# Patient Record
Sex: FEMALE | Race: WHITE | HISPANIC OR LATINO | Employment: FULL TIME | URBAN - METROPOLITAN AREA
[De-identification: names, ages, dates, MRNs, and addresses within clinical notes are randomized per-mention and may not be internally consistent; named-entity substitution may affect disease eponyms.]

---

## 2016-06-09 LAB
HPV_EXT - HISTORICAL: NORMAL
PAP SMEAR - HIM PATIENT REPORTED: NORMAL

## 2017-01-04 ENCOUNTER — COMMUNICATION - HEALTHEAST (OUTPATIENT)
Dept: FAMILY MEDICINE | Facility: CLINIC | Age: 42
End: 2017-01-04

## 2017-01-12 ENCOUNTER — OFFICE VISIT - HEALTHEAST (OUTPATIENT)
Dept: FAMILY MEDICINE | Facility: CLINIC | Age: 42
End: 2017-01-12

## 2017-01-12 DIAGNOSIS — E55.9 AVITAMINOSIS D: ICD-10-CM

## 2017-01-12 DIAGNOSIS — R73.03 PREDIABETES: ICD-10-CM

## 2017-01-12 DIAGNOSIS — K21.9 GASTROESOPHAGEAL REFLUX DISEASE WITHOUT ESOPHAGITIS: ICD-10-CM

## 2017-01-12 DIAGNOSIS — Z86.32 HISTORY OF GESTATIONAL DIABETES: ICD-10-CM

## 2017-01-12 NOTE — ASSESSMENT & PLAN NOTE
41 y.o. year old female in clinic today to discuss treatment of the following conditions through diet and lifestyle modification and weight loss:  1. BMI 40.0-44.9, adult    2. Prediabetes    3. History of gestational diabetes    4. Gastroesophageal reflux disease without esophagitis    5. Avitaminosis D      The patient's efforts this past month were successful as evidenced by continued improvement of weight measurements.  The patient feels well and is recovered from her ACL repair surgery.  She started to add back in physical activity such as light exercising.   -Recommend that she continue to increase her exercise   -Continue phentermine.  Refill sent.

## 2017-01-13 ENCOUNTER — COMMUNICATION - HEALTHEAST (OUTPATIENT)
Dept: FAMILY MEDICINE | Facility: CLINIC | Age: 42
End: 2017-01-13

## 2017-01-19 ENCOUNTER — COMMUNICATION - HEALTHEAST (OUTPATIENT)
Dept: HEALTH INFORMATION MANAGEMENT | Facility: CLINIC | Age: 42
End: 2017-01-19

## 2017-02-01 ENCOUNTER — COMMUNICATION - HEALTHEAST (OUTPATIENT)
Dept: FAMILY MEDICINE | Facility: CLINIC | Age: 42
End: 2017-02-01

## 2017-02-01 DIAGNOSIS — R73.03 PREDIABETES: ICD-10-CM

## 2017-02-07 ENCOUNTER — OFFICE VISIT - HEALTHEAST (OUTPATIENT)
Dept: FAMILY MEDICINE | Facility: CLINIC | Age: 42
End: 2017-02-07

## 2017-02-07 DIAGNOSIS — R73.03 PREDIABETES: ICD-10-CM

## 2017-02-07 ASSESSMENT — MIFFLIN-ST. JEOR: SCORE: 1538.33

## 2017-02-07 NOTE — ASSESSMENT & PLAN NOTE
Controlled with metformin, and active weight loss and lifestyle modification.  Patient has finally cross the 200 pounds.  She has dropped her BMI down to 33%.  Discussed making exercise slightlymore routine and how to keep track so as not fall off.  Continue diet course and current treatment modalities.  Follow-up in 1 month for recheck.

## 2017-03-02 ENCOUNTER — OFFICE VISIT - HEALTHEAST (OUTPATIENT)
Dept: RHEUMATOLOGY | Facility: CLINIC | Age: 42
End: 2017-03-02

## 2017-03-02 DIAGNOSIS — M13.0 POLYARTHROPATHY OR POLYARTHRITIS, HAND: ICD-10-CM

## 2017-03-02 DIAGNOSIS — M25.541 ARTHRALGIA OF BOTH HANDS: ICD-10-CM

## 2017-03-02 DIAGNOSIS — M25.542 ARTHRALGIA OF BOTH HANDS: ICD-10-CM

## 2017-03-02 DIAGNOSIS — M15.9 GENERALIZED OSTEOARTHROSIS OF HAND: ICD-10-CM

## 2017-03-16 ENCOUNTER — OFFICE VISIT - HEALTHEAST (OUTPATIENT)
Dept: FAMILY MEDICINE | Facility: CLINIC | Age: 42
End: 2017-03-16

## 2017-03-16 DIAGNOSIS — R73.03 PREDIABETES: ICD-10-CM

## 2017-03-16 ASSESSMENT — MIFFLIN-ST. JEOR: SCORE: 1537.42

## 2017-03-16 NOTE — ASSESSMENT & PLAN NOTE
Weight held steady with recent travel and increase at work.  The travel has now stopped and the work is back to normal.  Discussed food prep and planning.  Also discussed how to keep lifestyle modifications in place the next time these factors come to play.

## 2017-04-11 ENCOUNTER — OFFICE VISIT - HEALTHEAST (OUTPATIENT)
Dept: FAMILY MEDICINE | Facility: CLINIC | Age: 42
End: 2017-04-11

## 2017-04-11 ASSESSMENT — MIFFLIN-ST. JEOR: SCORE: 1534.25

## 2017-04-11 NOTE — ASSESSMENT & PLAN NOTE
Patient did very well this month and started exercising at the gym.  Discussed different avenues with her ACL repair of never walk on the treadmill flat always keep that a 1-1/2-2% elevation.  Actually recommended for the next 2 weeks that she stick with the elliptical, stationary bike and pool workouts before moving back to the treadmill to help develop the muscles in her knee and keep her knee supported while she makes exercise more for routine habit in her life.

## 2017-05-03 ENCOUNTER — COMMUNICATION - HEALTHEAST (OUTPATIENT)
Dept: FAMILY MEDICINE | Facility: CLINIC | Age: 42
End: 2017-05-03

## 2017-05-03 DIAGNOSIS — R73.03 PREDIABETES: ICD-10-CM

## 2017-05-09 ENCOUNTER — OFFICE VISIT - HEALTHEAST (OUTPATIENT)
Dept: FAMILY MEDICINE | Facility: CLINIC | Age: 42
End: 2017-05-09

## 2017-05-09 DIAGNOSIS — Z86.19 H/O COLD SORES: ICD-10-CM

## 2017-05-09 DIAGNOSIS — B00.2 HERPETIC GINGIVOSTOMATITIS: ICD-10-CM

## 2017-05-09 DIAGNOSIS — R73.03 PREDIABETES: ICD-10-CM

## 2017-05-09 ASSESSMENT — MIFFLIN-ST. JEOR: SCORE: 1529.71

## 2017-05-09 NOTE — ASSESSMENT & PLAN NOTE
Improving control through diet, exercise, lifestyle changes and weight loss.  Patient did well this month increasing exercise.  She is finding that when she deniesherself stuff then the following week she tends to fall off and basically gives up for the week and then re-motivated to restart.  Discussed that some of the things she is giving up we actually need to learn how to put back into her diet is as it is evident that she is not going to be able to maintain the lifestyle changes without somehow working it in appropriately.  Discussed strategies for doing so to work on this month.  Follow-up in 1 month.  Continue current treatment plan.

## 2017-06-13 ENCOUNTER — OFFICE VISIT - HEALTHEAST (OUTPATIENT)
Dept: FAMILY MEDICINE | Facility: CLINIC | Age: 42
End: 2017-06-13

## 2017-06-13 ASSESSMENT — MIFFLIN-ST. JEOR: SCORE: 1536.06

## 2017-07-17 ENCOUNTER — COMMUNICATION - HEALTHEAST (OUTPATIENT)
Dept: FAMILY MEDICINE | Facility: CLINIC | Age: 42
End: 2017-07-17

## 2017-08-07 ENCOUNTER — COMMUNICATION - HEALTHEAST (OUTPATIENT)
Dept: FAMILY MEDICINE | Facility: CLINIC | Age: 42
End: 2017-08-07

## 2017-08-07 DIAGNOSIS — R73.03 PREDIABETES: ICD-10-CM

## 2017-08-25 ENCOUNTER — OFFICE VISIT - HEALTHEAST (OUTPATIENT)
Dept: FAMILY MEDICINE | Facility: CLINIC | Age: 42
End: 2017-08-25

## 2017-08-25 DIAGNOSIS — R73.03 PREDIABETES: ICD-10-CM

## 2017-08-25 ASSESSMENT — MIFFLIN-ST. JEOR: SCORE: 1560.33

## 2017-08-25 NOTE — ASSESSMENT & PLAN NOTE
Patient fell off a meal prep and exercise.  Encouraged to restart meal prepping, and as systems go back to school looking at returning back to her exercise routine of walking.  Follow-up in 6 weeks for recheck.  Continue current treatment modalities.

## 2017-10-03 ENCOUNTER — COMMUNICATION - HEALTHEAST (OUTPATIENT)
Dept: SCHEDULING | Facility: CLINIC | Age: 42
End: 2017-10-03

## 2017-10-03 DIAGNOSIS — Z86.19 H/O COLD SORES: ICD-10-CM

## 2017-10-03 DIAGNOSIS — B00.2 HERPETIC GINGIVOSTOMATITIS: ICD-10-CM

## 2017-11-08 ENCOUNTER — COMMUNICATION - HEALTHEAST (OUTPATIENT)
Dept: FAMILY MEDICINE | Facility: CLINIC | Age: 42
End: 2017-11-08

## 2017-11-08 DIAGNOSIS — R73.03 PREDIABETES: ICD-10-CM

## 2017-11-16 ENCOUNTER — OFFICE VISIT - HEALTHEAST (OUTPATIENT)
Dept: FAMILY MEDICINE | Facility: CLINIC | Age: 42
End: 2017-11-16

## 2017-11-16 DIAGNOSIS — R73.03 PREDIABETES: ICD-10-CM

## 2017-11-16 ASSESSMENT — MIFFLIN-ST. JEOR: SCORE: 1535.83

## 2017-11-16 NOTE — ASSESSMENT & PLAN NOTE
Very well this month. Continue current plan. Discussed strategies for Thanksgiving Holiday and Holiday season. Planning days and food prep.

## 2017-12-07 ENCOUNTER — COMMUNICATION - HEALTHEAST (OUTPATIENT)
Dept: HEALTH INFORMATION MANAGEMENT | Facility: CLINIC | Age: 42
End: 2017-12-07

## 2017-12-07 ENCOUNTER — COMMUNICATION - HEALTHEAST (OUTPATIENT)
Dept: TELEHEALTH | Facility: CLINIC | Age: 42
End: 2017-12-07

## 2017-12-21 ENCOUNTER — OFFICE VISIT - HEALTHEAST (OUTPATIENT)
Dept: FAMILY MEDICINE | Facility: CLINIC | Age: 42
End: 2017-12-21

## 2017-12-21 ASSESSMENT — MIFFLIN-ST. JEOR: SCORE: 1538.1

## 2017-12-21 NOTE — ASSESSMENT & PLAN NOTE
[facility] over the past month despite the stresses.  She did very well in controlling her stress eating.  She is also increased her exercise.  We will continue with current plan for the next 1 month while she deals with her son's recent stress and the loss of her ex-.  Will plan for more focused weight loss after the new year.

## 2018-02-03 ENCOUNTER — COMMUNICATION - HEALTHEAST (OUTPATIENT)
Dept: FAMILY MEDICINE | Facility: CLINIC | Age: 43
End: 2018-02-03

## 2018-02-03 DIAGNOSIS — R73.03 PREDIABETES: ICD-10-CM

## 2018-02-06 ENCOUNTER — OFFICE VISIT - HEALTHEAST (OUTPATIENT)
Dept: RHEUMATOLOGY | Facility: CLINIC | Age: 43
End: 2018-02-06

## 2018-02-06 DIAGNOSIS — M25.50 HYPERMOBILITY ARTHRALGIA: ICD-10-CM

## 2018-02-06 DIAGNOSIS — M25.50 PAIN IN JOINT, MULTIPLE SITES: ICD-10-CM

## 2018-02-06 DIAGNOSIS — M13.0 POLYARTHROPATHY OR POLYARTHRITIS, HAND: ICD-10-CM

## 2018-02-06 DIAGNOSIS — M15.9 GENERALIZED OSTEOARTHROSIS OF HAND: ICD-10-CM

## 2018-02-06 DIAGNOSIS — Z79.899 HIGH RISK MEDICATION USE: ICD-10-CM

## 2018-02-06 LAB
ALBUMIN SERPL-MCNC: 3.8 G/DL (ref 3.5–5)
ALT SERPL W P-5'-P-CCNC: 18 U/L (ref 0–45)
CREAT SERPL-MCNC: 0.7 MG/DL (ref 0.6–1.1)
ERYTHROCYTE [DISTWIDTH] IN BLOOD BY AUTOMATED COUNT: 11.9 % (ref 11–14.5)
GFR SERPL CREATININE-BSD FRML MDRD: >60 ML/MIN/1.73M2
HCT VFR BLD AUTO: 41.3 % (ref 35–47)
HGB BLD-MCNC: 14.1 G/DL (ref 12–16)
MCH RBC QN AUTO: 30.7 PG (ref 27–34)
MCHC RBC AUTO-ENTMCNC: 34.2 G/DL (ref 32–36)
MCV RBC AUTO: 90 FL (ref 80–100)
PLATELET # BLD AUTO: 206 THOU/UL (ref 140–440)
PMV BLD AUTO: 8.6 FL (ref 7–10)
RBC # BLD AUTO: 4.6 MILL/UL (ref 3.8–5.4)
WBC: 6.3 THOU/UL (ref 4–11)

## 2018-02-06 ASSESSMENT — MIFFLIN-ST. JEOR: SCORE: 1538.1

## 2018-02-22 ENCOUNTER — OFFICE VISIT - HEALTHEAST (OUTPATIENT)
Dept: FAMILY MEDICINE | Facility: CLINIC | Age: 43
End: 2018-02-22

## 2018-02-22 DIAGNOSIS — R73.03 PREDIABETES: ICD-10-CM

## 2018-02-22 ASSESSMENT — MIFFLIN-ST. JEOR: SCORE: 1588

## 2018-02-22 NOTE — ASSESSMENT & PLAN NOTE
Diet and exercise reviewed.  Encouraged to start more routine exercise.  She has made a good choice and doing more food prep over the past week.  She is also has a agreement with her family thatthey are going to keep eating out to only once a month.

## 2018-03-29 ENCOUNTER — COMMUNICATION - HEALTHEAST (OUTPATIENT)
Dept: FAMILY MEDICINE | Facility: CLINIC | Age: 43
End: 2018-03-29

## 2018-04-05 ENCOUNTER — OFFICE VISIT - HEALTHEAST (OUTPATIENT)
Dept: FAMILY MEDICINE | Facility: CLINIC | Age: 43
End: 2018-04-05

## 2018-04-05 DIAGNOSIS — R73.03 PREDIABETES: ICD-10-CM

## 2018-04-05 ASSESSMENT — MIFFLIN-ST. JEOR: SCORE: 1595.71

## 2018-05-10 ENCOUNTER — OFFICE VISIT - HEALTHEAST (OUTPATIENT)
Dept: FAMILY MEDICINE | Facility: CLINIC | Age: 43
End: 2018-05-10

## 2018-05-10 DIAGNOSIS — R73.03 PREDIABETES: ICD-10-CM

## 2018-05-10 ASSESSMENT — MIFFLIN-ST. JEOR: SCORE: 1610.22

## 2018-05-10 NOTE — ASSESSMENT & PLAN NOTE
Might increase in weight.  It has been over 18 months since she had the educational class and she admits she does not remember most of it.  As such will send her back through the educational portion.  Follow-up in 1 month.  Continue phentermine and metformin at current dosing.

## 2018-08-08 ENCOUNTER — COMMUNICATION - HEALTHEAST (OUTPATIENT)
Dept: RHEUMATOLOGY | Facility: CLINIC | Age: 43
End: 2018-08-08

## 2018-08-08 DIAGNOSIS — M13.0 POLYARTHROPATHY OR POLYARTHRITIS, HAND: ICD-10-CM

## 2018-08-08 DIAGNOSIS — M25.50 PAIN IN JOINT, MULTIPLE SITES: ICD-10-CM

## 2018-09-07 ENCOUNTER — COMMUNICATION - HEALTHEAST (OUTPATIENT)
Dept: FAMILY MEDICINE | Facility: CLINIC | Age: 43
End: 2018-09-07

## 2018-09-07 DIAGNOSIS — Z86.19 H/O COLD SORES: ICD-10-CM

## 2018-09-07 DIAGNOSIS — B00.2 HERPETIC GINGIVOSTOMATITIS: ICD-10-CM

## 2018-09-20 ENCOUNTER — COMMUNICATION - HEALTHEAST (OUTPATIENT)
Dept: HEALTH INFORMATION MANAGEMENT | Facility: CLINIC | Age: 43
End: 2018-09-20

## 2018-09-20 ENCOUNTER — COMMUNICATION - HEALTHEAST (OUTPATIENT)
Dept: TELEHEALTH | Facility: CLINIC | Age: 43
End: 2018-09-20

## 2018-09-27 ENCOUNTER — COMMUNICATION - HEALTHEAST (OUTPATIENT)
Dept: FAMILY MEDICINE | Facility: CLINIC | Age: 43
End: 2018-09-27

## 2018-10-29 ENCOUNTER — OFFICE VISIT - HEALTHEAST (OUTPATIENT)
Dept: FAMILY MEDICINE | Facility: CLINIC | Age: 43
End: 2018-10-29

## 2018-10-29 DIAGNOSIS — R73.03 PREDIABETES: ICD-10-CM

## 2018-10-29 DIAGNOSIS — E66.01 SEVERE OBESITY (BMI 35.0-39.9) WITH COMORBIDITY (H): ICD-10-CM

## 2018-10-29 RX ORDER — METFORMIN HCL 500 MG
500 TABLET, EXTENDED RELEASE 24 HR ORAL
Qty: 90 TABLET | Refills: 1 | Status: SHIPPED | OUTPATIENT
Start: 2018-10-29 | End: 2023-05-12

## 2018-10-29 ASSESSMENT — MIFFLIN-ST. JEOR: SCORE: 1673.73

## 2018-10-29 NOTE — ASSESSMENT & PLAN NOTE
Begin journaling as previous.  Discussed increasing exercise.  Will reinstitute metformin as well as phentermine.  Follow-up in 1 month for re-check.

## 2018-11-01 ENCOUNTER — COMMUNICATION - HEALTHEAST (OUTPATIENT)
Dept: RHEUMATOLOGY | Facility: CLINIC | Age: 43
End: 2018-11-01

## 2018-11-01 DIAGNOSIS — M13.0 POLYARTHROPATHY OR POLYARTHRITIS, HAND: ICD-10-CM

## 2018-11-01 DIAGNOSIS — M25.50 PAIN IN JOINT, MULTIPLE SITES: ICD-10-CM

## 2018-11-28 ENCOUNTER — COMMUNICATION - HEALTHEAST (OUTPATIENT)
Dept: RHEUMATOLOGY | Facility: CLINIC | Age: 43
End: 2018-11-28

## 2018-11-28 DIAGNOSIS — M25.50 PAIN IN JOINT, MULTIPLE SITES: ICD-10-CM

## 2018-11-28 DIAGNOSIS — M13.0 POLYARTHROPATHY OR POLYARTHRITIS, HAND: ICD-10-CM

## 2018-12-10 ENCOUNTER — COMMUNICATION - HEALTHEAST (OUTPATIENT)
Dept: FAMILY MEDICINE | Facility: CLINIC | Age: 43
End: 2018-12-10

## 2019-01-02 ENCOUNTER — OFFICE VISIT - HEALTHEAST (OUTPATIENT)
Dept: RHEUMATOLOGY | Facility: CLINIC | Age: 44
End: 2019-01-02

## 2019-01-02 DIAGNOSIS — M25.50 HYPERMOBILITY ARTHRALGIA: ICD-10-CM

## 2019-01-02 DIAGNOSIS — M13.0 POLYARTHROPATHY OR POLYARTHRITIS, HAND: ICD-10-CM

## 2019-01-02 DIAGNOSIS — M25.50 PAIN IN JOINT, MULTIPLE SITES: ICD-10-CM

## 2019-01-02 DIAGNOSIS — M15.9 GENERALIZED OSTEOARTHROSIS OF HAND: ICD-10-CM

## 2019-01-02 LAB
ALBUMIN SERPL-MCNC: 3.8 G/DL (ref 3.5–5)
ALT SERPL W P-5'-P-CCNC: 18 U/L (ref 0–45)
CREAT SERPL-MCNC: 0.73 MG/DL (ref 0.6–1.1)
ERYTHROCYTE [DISTWIDTH] IN BLOOD BY AUTOMATED COUNT: 11.9 % (ref 11–14.5)
GFR SERPL CREATININE-BSD FRML MDRD: >60 ML/MIN/1.73M2
HCT VFR BLD AUTO: 43.1 % (ref 35–47)
HGB BLD-MCNC: 14.6 G/DL (ref 12–16)
MCH RBC QN AUTO: 30.5 PG (ref 27–34)
MCHC RBC AUTO-ENTMCNC: 33.9 G/DL (ref 32–36)
MCV RBC AUTO: 90 FL (ref 80–100)
PLATELET # BLD AUTO: 224 THOU/UL (ref 140–440)
PMV BLD AUTO: 9.1 FL (ref 7–10)
RBC # BLD AUTO: 4.79 MILL/UL (ref 3.8–5.4)
WBC: 7 THOU/UL (ref 4–11)

## 2019-01-02 RX ORDER — HYDROXYCHLOROQUINE SULFATE 200 MG/1
200 TABLET, FILM COATED ORAL 2 TIMES DAILY
Qty: 180 TABLET | Refills: 0 | Status: SHIPPED | OUTPATIENT
Start: 2019-01-02 | End: 2023-05-12

## 2019-01-19 ENCOUNTER — COMMUNICATION - HEALTHEAST (OUTPATIENT)
Dept: FAMILY MEDICINE | Facility: CLINIC | Age: 44
End: 2019-01-19

## 2019-01-19 DIAGNOSIS — R30.0 DYSURIA: ICD-10-CM

## 2019-01-30 ENCOUNTER — RECORDS - HEALTHEAST (OUTPATIENT)
Dept: ADMINISTRATIVE | Facility: OTHER | Age: 44
End: 2019-01-30

## 2019-02-13 ENCOUNTER — OFFICE VISIT - HEALTHEAST (OUTPATIENT)
Dept: FAMILY MEDICINE | Facility: CLINIC | Age: 44
End: 2019-02-13

## 2019-02-13 DIAGNOSIS — E66.01 SEVERE OBESITY (BMI 35.0-39.9) WITH COMORBIDITY (H): ICD-10-CM

## 2019-02-13 ASSESSMENT — MIFFLIN-ST. JEOR: SCORE: 1695.5

## 2019-02-13 NOTE — ASSESSMENT & PLAN NOTE
Continue metformin and phentermine.  She did start tracking foods a week and a half ago.  Encouraged to continue tracking.  Also discussed how to increase exercise with a rowing machine slowly so is pain or injury.

## 2019-03-25 ENCOUNTER — COMMUNICATION - HEALTHEAST (OUTPATIENT)
Dept: FAMILY MEDICINE | Facility: CLINIC | Age: 44
End: 2019-03-25

## 2019-03-25 DIAGNOSIS — E66.01 SEVERE OBESITY (BMI 35.0-39.9) WITH COMORBIDITY (H): ICD-10-CM

## 2019-04-24 ENCOUNTER — OFFICE VISIT - HEALTHEAST (OUTPATIENT)
Dept: FAMILY MEDICINE | Facility: CLINIC | Age: 44
End: 2019-04-24

## 2019-04-24 DIAGNOSIS — E66.01 SEVERE OBESITY (BMI 35.0-39.9) WITH COMORBIDITY (H): ICD-10-CM

## 2019-04-24 ASSESSMENT — MIFFLIN-ST. JEOR: SCORE: 1687.79

## 2019-04-24 NOTE — ASSESSMENT & PLAN NOTE
Was doing well until used food as an avoidance technique for recent death in the family.  We discussed developing other coping mechanisms and the rational.  Continue to journal foods more closely.  Follow-up in 2 months.

## 2019-06-17 ENCOUNTER — COMMUNICATION - HEALTHEAST (OUTPATIENT)
Dept: FAMILY MEDICINE | Facility: CLINIC | Age: 44
End: 2019-06-17

## 2019-06-17 DIAGNOSIS — E66.01 SEVERE OBESITY (BMI 35.0-39.9) WITH COMORBIDITY (H): ICD-10-CM

## 2019-06-19 RX ORDER — PHENTERMINE HYDROCHLORIDE 37.5 MG/1
18.75 TABLET ORAL 2 TIMES DAILY
Qty: 30 TABLET | Refills: 0 | Status: SHIPPED | OUTPATIENT
Start: 2019-06-19 | End: 2023-05-12

## 2019-07-10 ENCOUNTER — RECORDS - HEALTHEAST (OUTPATIENT)
Dept: HEALTH INFORMATION MANAGEMENT | Facility: CLINIC | Age: 44
End: 2019-07-10

## 2019-10-14 ENCOUNTER — RECORDS - HEALTHEAST (OUTPATIENT)
Dept: LAB | Facility: HOSPITAL | Age: 44
End: 2019-10-14

## 2019-10-15 LAB — HBV SURFACE AB SERPL IA-ACNC: POSITIVE M[IU]/ML

## 2019-10-16 LAB — VZV IGG SER QL IA: NEGATIVE

## 2020-03-03 ENCOUNTER — COMMUNICATION - HEALTHEAST (OUTPATIENT)
Dept: RHEUMATOLOGY | Facility: CLINIC | Age: 45
End: 2020-03-03

## 2020-03-03 DIAGNOSIS — M25.50 PAIN IN JOINT, MULTIPLE SITES: ICD-10-CM

## 2020-03-03 DIAGNOSIS — M13.0 POLYARTHROPATHY OR POLYARTHRITIS, HAND: ICD-10-CM

## 2020-03-14 ENCOUNTER — COMMUNICATION - HEALTHEAST (OUTPATIENT)
Dept: FAMILY MEDICINE | Facility: CLINIC | Age: 45
End: 2020-03-14

## 2020-03-14 ENCOUNTER — COMMUNICATION - HEALTHEAST (OUTPATIENT)
Dept: RHEUMATOLOGY | Facility: CLINIC | Age: 45
End: 2020-03-14

## 2020-03-14 DIAGNOSIS — M13.0 POLYARTHROPATHY OR POLYARTHRITIS, HAND: ICD-10-CM

## 2020-03-14 DIAGNOSIS — M25.50 PAIN IN JOINT, MULTIPLE SITES: ICD-10-CM

## 2020-03-14 DIAGNOSIS — Z86.19 H/O COLD SORES: ICD-10-CM

## 2020-03-14 DIAGNOSIS — B00.2 HERPETIC GINGIVOSTOMATITIS: ICD-10-CM

## 2021-03-12 ENCOUNTER — COMMUNICATION - HEALTHEAST (OUTPATIENT)
Dept: FAMILY MEDICINE | Facility: CLINIC | Age: 46
End: 2021-03-12

## 2021-03-12 DIAGNOSIS — B00.2 HERPETIC GINGIVOSTOMATITIS: ICD-10-CM

## 2021-03-12 DIAGNOSIS — Z86.19 H/O COLD SORES: ICD-10-CM

## 2021-03-12 RX ORDER — VALACYCLOVIR HYDROCHLORIDE 1 G/1
TABLET, FILM COATED ORAL
Qty: 30 TABLET | Refills: 0 | Status: SHIPPED | OUTPATIENT
Start: 2021-03-12 | End: 2023-05-12

## 2021-05-24 ENCOUNTER — RECORDS - HEALTHEAST (OUTPATIENT)
Dept: ADMINISTRATIVE | Facility: CLINIC | Age: 46
End: 2021-05-24

## 2021-05-26 ENCOUNTER — RECORDS - HEALTHEAST (OUTPATIENT)
Dept: ADMINISTRATIVE | Facility: CLINIC | Age: 46
End: 2021-05-26

## 2021-05-27 NOTE — TELEPHONE ENCOUNTER
Controlled Substance Refill Request  Medication:   Requested Prescriptions     Pending Prescriptions Disp Refills     phentermine (ADIPEX-P) 37.5 mg tablet 30 tablet 0     Sig: Take 0.5 tablets (18.75 mg total) by mouth 2 (two) times a day.     Date Last Fill: 2/13/19  Pharmacy: [id number]   Submit electronically to pharmacy  Controlled Substance Agreement on File:   Encounter-Level CSA Scan Date:    There are no encounter-level csa scan date.       Last office visit: Last office visit pertaining to requested medication was 2/13/19.

## 2021-05-28 NOTE — PROGRESS NOTES
PLAN  Continue medications.  Continue supplements.  This month concentrate on increasing exercise  Return in about 2 months (around 6/24/2019) for WLP.      Severe obesity (BMI 35.0-39.9) with comorbidity (H)  Was doing well until used food as an avoidance technique for recent death in the family.  We discussed developing other coping mechanisms and the rational.  Continue to journal foods more closely.  Follow-up in 2 months.        Initial Weight: 235 lbs  Weight: (!) 233 lb 3.2 oz (105.8 kg)  Weight loss from initial: 1.8  % Weight loss: 0.77 %      Are you experiencing any side effects to the medications:  No  Hunger control:  Good control on hunger, but had increased food intake secondary to death in the family and funerals, coping mechanism for avoidance.  Exercise was discussed: yes  Taking supplements:  n/a  Discussed journaling food:  no  Patient is pleased with the current results:  no  The patient is following the nutrition plan:  yes  Barriers to losing weight:  Behavior:  Amnesia Calories:   stress (comfort)    Vitals:    04/24/19 1117   BP: 124/76   Pulse: 68   Resp: 12   Temp: 98.3  F (36.8  C)     General: Alert and oriented ×3  Heart: Regular rate and rhythm without murmur or gallop  Lungs: Clear to auscultation bilateral  Extremities: No cyanosis or edema

## 2021-05-29 NOTE — TELEPHONE ENCOUNTER
Controlled Substance Refill Request  Medication:   Requested Prescriptions     Pending Prescriptions Disp Refills     phentermine (ADIPEX-P) 37.5 mg tablet 30 tablet 0     Sig: Take 0.5 tablets (18.75 mg total) by mouth 2 (two) times a day.     Date Last Fill: 4/24/19  Pharmacy: [id number]   Submit electronically to pharmacy  Controlled Substance Agreement on File:   Encounter-Level CSA Scan Date:    There are no encounter-level csa scan date.       Last office visit: Last office visit pertaining to requested medication was 4/24/19.

## 2021-05-30 VITALS — HEIGHT: 65 IN | BODY MASS INDEX: 33.04 KG/M2 | WEIGHT: 198.3 LBS

## 2021-05-30 VITALS — WEIGHT: 197.6 LBS | HEIGHT: 65 IN | BODY MASS INDEX: 32.92 KG/M2

## 2021-05-30 VITALS — BODY MASS INDEX: 32.76 KG/M2 | HEIGHT: 65 IN | WEIGHT: 196.6 LBS

## 2021-05-30 VITALS — WEIGHT: 204 LBS | BODY MASS INDEX: 34.48 KG/M2

## 2021-05-30 VITALS — BODY MASS INDEX: 33.07 KG/M2 | HEIGHT: 65 IN | WEIGHT: 198.5 LBS

## 2021-05-30 VITALS — WEIGHT: 202.7 LBS | BODY MASS INDEX: 34.26 KG/M2

## 2021-05-31 ENCOUNTER — RECORDS - HEALTHEAST (OUTPATIENT)
Dept: ADMINISTRATIVE | Facility: CLINIC | Age: 46
End: 2021-05-31

## 2021-05-31 VITALS — HEIGHT: 64 IN | WEIGHT: 200.2 LBS | BODY MASS INDEX: 34.18 KG/M2

## 2021-05-31 VITALS — HEIGHT: 64 IN | WEIGHT: 199.7 LBS | BODY MASS INDEX: 34.09 KG/M2

## 2021-05-31 VITALS — HEIGHT: 65 IN | WEIGHT: 198 LBS | BODY MASS INDEX: 32.99 KG/M2

## 2021-05-31 VITALS — HEIGHT: 64 IN | WEIGHT: 205.1 LBS | BODY MASS INDEX: 35.01 KG/M2

## 2021-05-31 VITALS — WEIGHT: 200.2 LBS | BODY MASS INDEX: 34.18 KG/M2 | HEIGHT: 64 IN

## 2021-06-01 ENCOUNTER — RECORDS - HEALTHEAST (OUTPATIENT)
Dept: ADMINISTRATIVE | Facility: CLINIC | Age: 46
End: 2021-06-01

## 2021-06-01 VITALS — BODY MASS INDEX: 36.89 KG/M2 | WEIGHT: 216.1 LBS | HEIGHT: 64 IN

## 2021-06-01 VITALS — WEIGHT: 211.2 LBS | BODY MASS INDEX: 36.06 KG/M2 | HEIGHT: 64 IN

## 2021-06-01 VITALS — WEIGHT: 212.9 LBS | HEIGHT: 64 IN | BODY MASS INDEX: 36.35 KG/M2

## 2021-06-02 VITALS — HEIGHT: 64 IN | WEIGHT: 233.2 LBS | BODY MASS INDEX: 39.81 KG/M2

## 2021-06-02 VITALS — WEIGHT: 234.9 LBS | HEIGHT: 64 IN | BODY MASS INDEX: 40.1 KG/M2

## 2021-06-02 VITALS — HEIGHT: 64 IN | WEIGHT: 230.1 LBS | BODY MASS INDEX: 39.28 KG/M2

## 2021-06-02 VITALS — BODY MASS INDEX: 39.48 KG/M2 | WEIGHT: 230 LBS

## 2021-06-06 NOTE — TELEPHONE ENCOUNTER
RN cannot approve Refill Request    RN can NOT refill this medication Protocol failed and NO refill given.         Swati Mendoza, [facility] Triage/Med Refill 3/16/2020    Requested Prescriptions   Pending Prescriptions Disp Refills     valACYclovir (VALTREX) 1000 MG tablet [Pharmacy Med Name: VALACYCLOVIR 1GM TABLETS] 30 tablet 0     Sig: TAKE 2 TABLETS BY MOUTH 1 TIME. REPEAT WITH 2 TABLETS IN 12 HOURS       Antivirals Refill Protocol Failed - 3/14/2020  4:16 PM        Failed - Renal function done in last year     Creatinine   Date Value Ref Range Status   01/02/2019 0.73 0.60 - 1.10 mg/dL Final             Passed - Visit with PCP or prescribing provider visit in past 12 months or next 3 months     Last office visit with prescriber/PCP: 4/24/2019 Ananda Lam DO OR same dept: 4/24/2019 Ananda Lam DO OR same specialty: 4/24/2019 Ananda Lam DO  Last physical: 8/31/2016 Last MTM visit: Visit date not found   Next visit within 3 mo: Visit date not found  Next physical within 3 mo: Visit date not found  Prescriber OR PCP: Ananda Lam DO  Last diagnosis associated with med order: 1. Herpes Simplex Acute Gingivostomatitis  - valACYclovir (VALTREX) 1000 MG tablet [Pharmacy Med Name: VALACYCLOVIR 1GM TABLETS]; TAKE 2 TABLETS BY MOUTH 1 TIME. REPEAT WITH 2 TABLETS IN 12 HOURS  Dispense: 30 tablet; Refill: 0    2. H/O cold sores  - valACYclovir (VALTREX) 1000 MG tablet [Pharmacy Med Name: VALACYCLOVIR 1GM TABLETS]; TAKE 2 TABLETS BY MOUTH 1 TIME. REPEAT WITH 2 TABLETS IN 12 HOURS  Dispense: 30 tablet; Refill: 0    If protocol passes may refill for 12 months if within 3 months of last provider visit (or a total of 15 months).             Passed - Patient does not have active pregnancy episode        Passed - Patient has not had positive pregnancy test in last 280 days     Pregnancy Test, Urine   Date Value Ref Range Status   01/25/2010 Negative  Final

## 2021-06-08 NOTE — PROGRESS NOTES
PLAN  Follow up in 1 month.  Continue medications.  Continue supplements.  This month concentrate on Making exercise more routine    Prediabetes  Controlled with metformin, and active weight loss and lifestyle modification.  Patient has finally cross the 200 pounds.  She has dropped her BMI down to 33%.  Discussed making exercise slightly more routine and how to keep track so as not fall off.  Continue diet course and current treatment modalities.  Follow-up in 1 month for recheck.        Initial Weight: 235 lbs  Weight: 198 lb 8 oz (90 kg)  Weight loss from initial: 36.5  % Weight loss: 15.53 %    Are you experiencing any side effects to the medications:  No  Hunger control:  good  Exercise was discussed: yes  Taking supplements:  yes  Discussed journaling food:  yes  Patient is pleased with the current results:  yes  The patient is following the nutrition plan:  yes  Barriers to losing weight:  Behavior:  inadequate exercise    Vitals:    02/07/17 1257   BP: 118/78   Pulse: 68   Resp: 12   Temp: 98.4  F (36.9  C)     General: Alert and oriented ×3  Heart: Regular rate and rhythm without murmur or gallop  Lungs: Clear to auscultation bilateral  Extremities: No cyanosis or edema

## 2021-06-08 NOTE — PROGRESS NOTES
Assessment and Plan:     BMI 40.0-44.9, adult  41 y.o. year old female in clinic today to discuss treatment of the following conditions through diet and lifestyle modification and weight loss:  1. BMI 40.0-44.9, adult    2. Prediabetes    3. History of gestational diabetes    4. Gastroesophageal reflux disease without esophagitis    5. Avitaminosis D      The patient's efforts this past month were successful as evidenced by continued improvement of weight measurements.  The patient feels well and is recovered from her ACL repair surgery.  She started to add back in physical activity such as light exercising.   -Recommend that she continue to increase her exercise   -Continue phentermine.  Refill sent.    Follow up in 1 month.  Continue medications.  Continue supplements.  Doing well with phentermine, refill given today.  Recommend increasing movement throughout the day--sitting less, moving more.  Will increase activity over time to reach a goal of at least 150 minutes of moderate exercise each week.  Behavior modification:  Cognitive restructuring exercises, journaling stressors, triggers for food cravings.  Dietary Intervention:  Reduced calorie, reduced carbohydrates, whole food diet.  Greater than 50% of this 15 minute visit was spent in counseling regarding patient's obesity, medications, dietary, exercise, and behavior modification recommendations.    Subjective  Patient presents for treatment of chronic, comorbid conditions using intensive therapeutic lifestyle interventions including nutrition, physical activity, and behavior management.    Struggled with holiday calories but tried to eat protein.  Exercising is going well. - met with Graciela recently.  Working out 3-4x/week.  Has been back at work for a few weeks.    ~1300 calories.  Gets the protein.  Carbs are generally at target.   Acid reflux has resolved.    Planning no EtOH in January.     Are you experiencing any side effects to the medications:   No  Hunger control:  good  Exercise was discussed: yes  Taking supplements:  yes  Discussed journaling food:  yes  Patient is pleased with the current results:  yes  The patient is following the nutrition plan:  yes  Barriers to losing weight:  Behavior:  social calories    Patient Active Problem List   Diagnosis     Prediabetes     Herpes Simplex Acute Gingivostomatitis     Joint Pain Fingers     Polyarthritis Of The Hand     Arthralgias In Multiple Sites     Generalized Osteoarthritis Of The Hand     High risk medication use     History of gestational diabetes     Gastroesophageal reflux disease without esophagitis     BMI 40.0-44.9, adult     Avitaminosis D       Current Outpatient Prescriptions on File Prior to Visit   Medication Sig Dispense Refill     hydroxychloroquine (PLAQUENIL) 200 mg tablet Take 1 tablet by mouth 2 (two) times a day.  5     metFORMIN (GLUCOPHAGE-XR) 500 MG 24 hr tablet TAKE 1 TABLET(500 MG) BY MOUTH DAILY WITH DINNER 30 tablet 1     valACYclovir (VALTREX) 1000 MG tablet TAKE 2 TABLETS BY MOUTH AT ONCE. THEN REPEAT WITH 2 TABLETS IN 12 HOURS 30 tablet 0     No current facility-administered medications on file prior to visit.        Objective:  Vitals:    01/12/17 1622   BP: 124/80   Pulse: 70     Initial Weight: 235 lbs  Weight: 204 lb (92.5 kg)  Weight loss from initial: 31  % Weight loss: 13.19 %  Body mass index is 34.48 kg/(m^2).  General:  Patient is alert, pleasant, no distress.  Patient is obese.    This note has been dictated using voice recognition software. Any grammatical or context distortions are unintentional and inherent to the software

## 2021-06-09 NOTE — PROGRESS NOTES
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  No history of psoriasis.  She feels so much better with hydroxychloroquine.  She has been taking it regularly.  At one point in the past she has stopped taking it and had significant resurgence of pain.  She is aware that the FDA approval is not available for this type of usage but as well as she is doing and little other options and she is happy to continue.  She did have her eyes examined recently.  I have asked to return for follow-up in 6 months.      Diagnoses and all orders for this visit:    Polyarthritis Of The Hand  -     hydroxychloroquine (PLAQUENIL) 200 mg tablet; Take 1 tablet (200 mg total) by mouth 2 (two) times a day.  Dispense: 60 tablet; Refill: 5    Arthralgia of both hands    Generalized Osteoarthritis Of The Hand        HISTORY OF PRESENTING ILLNESS:  Mariluz Duron 41 y.o. is here for follow up of arthralgias due to severe, premature, erosive, inflammatory osteoarthritis..  Joints affected include multiple joints. This has gone on for several years . Pain is described as aching. It is infrequent now.  Her symptoms are now mild. The symptoms are gradually improving. Symptoms include pain, tenderness to touch, swelling, redness.  Treatment to date has been with significant relief.   She does not have a history of psoriasis, ulcerative enteritis Crohn's disease. She has noted significant improvement.  There is very little redness, swelling or stiffness in her joints.  She noted the moderately severe discomfort 2.0/10.  She sometimes gets back pain.  She is able to do most of her day-to-day activities without difficulty.  She noted right hip area pain.  This is with certain movements.  She's been to orthopedics.  It sounds as if she has had a diagnosis of labral tear.  She has deferred " surgical intervention.  Further historical information, including ROS and limitation in activities as noted in the multidimensional health assessment questionnaire scanned in the EMR and in the assessment and plan section.  There is no family history or personal history of psoriasis.    ALLERGIES:Review of patient's allergies indicates no known allergies.    PAST MEDICAL/ACTIVE PROBLEMS/MEDICATION/SOCIAL DATA  Past Medical History:   Diagnosis Date     Gastroesophageal reflux      Prediabetes      History   Smoking Status     Former Smoker   Smokeless Tobacco     Never Used     Patient Active Problem List   Diagnosis     Prediabetes     Herpes Simplex Acute Gingivostomatitis     Joint Pain Fingers     Polyarthritis Of The Hand     Arthralgias In Multiple Sites     Generalized Osteoarthritis Of The Hand     High risk medication use     History of gestational diabetes     Gastroesophageal reflux disease without esophagitis     BMI 33.0-33.9,adult     Avitaminosis D     Current Outpatient Prescriptions   Medication Sig Dispense Refill     hydroxychloroquine (PLAQUENIL) 200 mg tablet Take 1 tablet by mouth 2 (two) times a day.  5     metFORMIN (GLUCOPHAGE-XR) 500 MG 24 hr tablet TAKE 1 TABLET(500 MG) BY MOUTH DAILY WITH DINNER 90 tablet 0     phentermine (ADIPEX-P) 37.5 mg tablet Take 0.5 tablets (18.75 mg total) by mouth 2 (two) times a day. 30 tablet 0     valACYclovir (VALTREX) 1000 MG tablet TAKE 2 TABLETS BY MOUTH AT ONCE. THEN REPEAT WITH 2 TABLETS IN 12 HOURS 30 tablet 0     No current facility-administered medications for this visit.      DETAILED EXAMINATION (six area) :  Vitals:    03/02/17 0849   BP: 122/78   Patient Site: Right Arm   Patient Position: Sitting   Cuff Size: Adult Regular   Pulse: 68   Weight: 202 lb 11.2 oz (91.9 kg)     Alert oriented. Head including the face is examined for malar rash, heliotropes, scarring, lupus pernio. Eyes examined for redness such as in episcleritis/scleritis,  periorbital lesions.   Neck examined  for lymph nodes, range of motion Both upper and lower extremities (all four) examined for swollen, warm &/or  tender joints, range of motion, rash, muscle weakness, edema. The salient normal / abnormal findings are appended.     She has marked [place] nodes which are not inflamed.   No appreciable synovitis in any of the palpable appendicular joints.  There is no nail pitting.  She does not have dactylitis or enthesitis.    LAB / IMAGING DATA:  ALT   Date Value Ref Range Status   08/25/2016 19 0 - 45 U/L Final   07/08/2014 12 0 - 45 U/L Final     ALBUMIN   Date Value Ref Range Status   08/25/2016 3.8 3.5 - 5.0 g/dL Final   07/08/2014 3.7 3.5 - 5.0 g/dL Final     CREATININE   Date Value Ref Range Status   08/25/2016 0.74 0.60 - 1.10 mg/dL Final   07/08/2014 0.74 0.60 - 1.10 mg/dL Final   04/17/2014 0.8 0.6 - 1.3 mg/dL Final       WBC   Date Value Ref Range Status   08/25/2016 6.7 4.0 - 11.0 thou/uL Final   07/08/2014 8.5 4.0 - 11.0 thou/uL Final     HEMOGLOBIN   Date Value Ref Range Status   08/25/2016 14.7 12.0 - 16.0 g/dL Final   07/08/2014 13.4 12.0 - 16.0 g/dL Final   01/25/2010 13.0 12.0 - 16.0 g/dL Final     PLATELETS   Date Value Ref Range Status   08/25/2016 249 140 - 440 thou/uL Final   07/08/2014 203 140 - 440 thou/uL Final       Lab Results   Component Value Date    RF <15 04/17/2014    SEDRATE 18 04/17/2014

## 2021-06-09 NOTE — PROGRESS NOTES
PLAN  Follow up in 1 month.  Continue medications.  Continue supplements.  This month concentrate on journaling    Prediabetes  Weight held steady with recent travel and increase at work.  The travel has now stopped and the work is back to normal.  Discussed food prep and planning.  Also discussed how to keep lifestyle modifications in place the next time these factors come to play.        Initial Weight: 235 lbs  Weight: 198 lb 4.8 oz (89.9 kg)  Weight loss from initial: 36.7  % Weight loss: 15.62 %    Are you experiencing any side effects to the medications:  No  Hunger control:  good  Exercise was discussed: yes  Taking supplements:  yes  Discussed journaling food:  yes  Patient is pleased with the current results: Patient was actually surprised her weight held stable and did not go up  The patient is following the nutrition plan:  yes  Barriers to losing weight:  Behavior:  Amnesia Calories:   stress (comfort) and habit    Vitals:    03/16/17 1045   BP: 112/68   Pulse: 68   Resp: 12   Temp: 98.3  F (36.8  C)     General: Alert and oriented ×3  Heart: Regular rate and rhythm without murmur or gallop  Lungs: Clear to auscultation bilateral  Extremities: No cyanosis or edema

## 2021-06-10 NOTE — PROGRESS NOTES
PLAN  Follow up in 1 month.  Continue medications.  Continue supplements.  This month concentrate on journaling    Herpes Simplex Acute Gingivostomatitis  She is 3-4 flares a year, has been well controlled.  Will continue current pulse therapy.    Prediabetes  Improving control through diet, exercise, lifestyle changes and weight loss.  Patient did well this month increasing exercise.  She is finding that when she denies herself stuff then the following week she tends to fall off and basically gives up for the week and then re-motivated to restart.  Discussed that some of the things she is giving up we actually need to learn how to put back into her diet is as it is evident that she is not going to be able to maintain the lifestyle changes without somehow working it in appropriately.  Discussed strategies for doing so to work on this month.  Follow-up in 1 month.  Continue current treatment plan.        Initial Weight: 235 lbs  Weight: 196 lb 9.6 oz (89.2 kg)  Weight loss from initial: 38.4  % Weight loss: 16.34 %    Are you experiencing any side effects to the medications:  No  Hunger control:  good  Exercise was discussed: yes  Taking supplements:  yes  Discussed journaling food:  yes  Patient is pleased with the current results:  yes  The patient is following the nutrition plan:  yes  Barriers to losing weight:  Behavior:  Amnesia Calories:   boredom and habit    Vitals:    05/09/17 1345   BP: 104/64   Pulse: 64   Resp: 12   Temp: 98.3  F (36.8  C)     General: Alert and oriented ×3  Heart: Regular rate and rhythm without murmur or gallop  Lungs: Clear to auscultation bilateral  Extremities: No cyanosis or edema

## 2021-06-10 NOTE — PROGRESS NOTES
PLAN  Follow up in 1 month.  Continue medications.  Continue supplements.  This month concentrate on increasing exercise    BMI 33.0-33.9,adult  Patient did very well this month and started exercising at the gym.  Discussed different avenues with her ACL repair of never walk on the treadmill flat always keep that a 1-1/2-2% elevation.  Actually recommended for the next 2 weeks that she stick with the elliptical, stationary bike and pool workouts before moving back to the treadmill to help develop the muscles in her knee and keep her knee supported while she makes exercise more for routine habit in her life.        Initial Weight: 235 lbs  Weight: 197 lb 9.6 oz (89.6 kg)  Weight loss from initial: 37.4  % Weight loss: 15.91 %    Are you experiencing any side effects to the medications:  No  Hunger control:  good  Exercise was discussed: yes  Taking supplements:  yes  Discussed journaling food:  yes  Patient is pleased with the current results:  yes  The patient is following the nutrition plan:  yes  Barriers to losing weight:  Behavior:  Amnesia Calories:   stress (comfort) and social    Vitals:    04/11/17 1300   BP: 110/72   Pulse: 72   Resp: 12   Temp: 98.4  F (36.9  C)     General: Alert and oriented ×3  Heart: Regular rate and rhythm without murmur or gallop  Lungs: Clear to auscultation bilateral  Extremities: No cyanosis or edema

## 2021-06-11 NOTE — PROGRESS NOTES
ASSESSMENT AND PLAN:   We spent 25 minutes today in direct patient contact, 100% of the time in consultation concerning medical problems as listed below.     SUMMARY  Ananda Lam, DO  - pcp  Start weight 235 lbs, 8/25/16 - 198 lbs 6/13/2017 (down 15% of weight)  Goal weight - she has currently exceeded our expectations and wants to continue to lose and become healthier, right now she is practicing maintaining her weight.      Dx: prediabetes and gerd  Prescribed meds: phentermine  Supplements: fish oil, mvi, vit d, & chromium  Goals this month: wants to increase time fasting over night. Currently fasts from 10p to 6:30 am. And breakfast is coffee with cream at 6:30 and then omelette with toast or a smoothie at 9am or so.   Future topics: trigger foods, diet pop  Follow up monthly.    Recommended macronutrients;   Calories: 1475 daily  Protein 110 grams per day  carbs 50-75 grams per day (up to 150 if vigirous exercise)      Chief Complaint   Patient presents with     Weight Loss     HPI  Mariluz Duron is a 41 y.o. female comes in for follow up. She is new to me today. She usually sees my partner Dr. Lam, but he was unavailable today.     Are you experiencing any side effects to the medications:  No  Hunger control:  good  Exercise was discussed: yes - she has been doing elliptical and swimming with [place] [facility] for the past two months.   Taking supplements:  yes  Discussed journaling food:  yes  Patient is pleased with the current results:  yes  The patient is following the nutrition plan:  yes  Barriers to losing weight:  Behavior:  Amnesia Calories:   boredom and habit   Drinks 12 - 24 oz of diet pepsi per day.     Vitals:    06/13/17 1136   BP: 112/70   Pulse: 60   Resp: 12     Initial Weight: 235 lbs  Weight: 198 lb (89.8 kg)  Weight loss from initial: 37  % Weight loss: 15.74 %     Physical Exam   Constitutional: She is oriented to person, place, and time. She appears well-developed and  well-nourished.   HENT:   Head: Normocephalic and atraumatic.   Eyes: Conjunctivae are normal.   Cardiovascular: Normal rate and regular rhythm.    Pulmonary/Chest: Effort normal.   Neurological: She is alert and oriented to person, place, and time.   Skin: Skin is warm and dry.   Psychiatric: She has a normal mood and affect.

## 2021-06-12 NOTE — PROGRESS NOTES
PLAN  Follow up in 1 month.  Continue medications.  Continue supplements.  This month concentrate on increasing exercise    Prediabetes  Patient fell off a meal prep and exercise.  Encouraged to restart meal prepping, and as systems go back to school looking at returning back to her exercise routine of walking.  Follow-up in 6 weeks for recheck.  Continue current treatment modalities.        Weight: 205 lb 1.6 oz (93 kg)    Are you experiencing any side effects to the medications:  No  Hunger control:  good  Exercise was discussed: yes  Taking supplements:  yes  Discussed journaling food:  yes  Patient is pleased with the current results:  no, admits that she did not stick to the diet plan this past month and has not been making why his meal choices at work  The patient is following the nutrition plan:  no  Barriers to losing weight:  Behavior:  Amnesia Calories:   stress (comfort)    Vitals:    08/25/17 1314   BP: 104/62   Pulse: 67   SpO2: 98%     General: Alert and oriented ×3  Heart: Regular rate and rhythm without murmur or gallop  Lungs: Clear to auscultation bilateral  Extremities: No cyanosis or edema

## 2021-06-14 NOTE — PROGRESS NOTES
PLAN  Follow up in 1 month.  Continue medications.  Continue supplements.  This month concentrate on increasing exercise    BMI 33.0-33.9,[facility] over the past month despite the stresses.  She did very well in controlling her stress eating.  She is also increased her exercise.  We will continue with current plan for the next 1 month while she deals with her son's recent stress and the loss of her ex-.  Will plan for more focused weight loss after the new year.        Initial Weight: 235 lbs  Weight: 200 lb 3.2 oz (90.8 kg)  Weight loss from initial: 34.8  % Weight loss: 14.81 %    Are you experiencing any side effects to the medications:  No  Hunger control:  good  Exercise was discussed: yes  Taking supplements:  yes  Discussed journaling food:  yes  Patient is pleased with the current results:  yes  The patient is following the nutrition plan:  yes  Barriers to losing weight:  Behavior:  Amnesia Calories:   stress (comfort)    Vitals:    12/21/17 0928   BP: 122/76   Pulse: 64   Resp: 12   Temp: 98.3  F (36.8  C)     General: Alert and oriented ×3  Heart: Regular rate and rhythm without murmur or gallop  Lungs: Clear to auscultation bilateral  Extremities: No cyanosis or edema

## 2021-06-14 NOTE — PROGRESS NOTES
PLAN  Follow up in 1 month.  Continue medications.  Continue supplements.  This month concentrate on increasing exercise    Prediabetes  Very well this month. Continue current plan. Discussed strategies for Thanksgiving Holiday and Holiday season. Planning days and food prep.        Initial Weight: 235 lbs  Weight: 199 lb 11.2 oz (90.6 kg)  Weight loss from initial: 35.3  % Weight loss: 15.02 %    Are you experiencing any side effects to the medications:  No  Hunger control:  good  Exercise was discussed: yes  Taking supplements:  yes  Discussed journaling food:  yes  Patient is pleased with the current results:  yes  The patient is following the nutrition plan:  yes  Barriers to losing weight:  Behavior:  social calories    Vitals:    11/16/17 1040   BP: 108/64   Pulse: 64   Resp: 12   Temp: 97.8  F (36.6  C)     General: Alert and oriented ×3  Heart: Regular rate and rhythm without murmur or gallop  Lungs: Clear to auscultation bilateral  Extremities: No cyanosis or edema

## 2021-06-15 NOTE — TELEPHONE ENCOUNTER
RN cannot approve Refill Request    RN can NOT refill this medication Protocol failed and NO refill given. Last office visit: 4/24/2019 Ananda Lam DO Last Physical: Visit date not found Last MTM visit: Visit date not found Last visit same specialty: 4/24/2019 Ananda Lam DO.  Next visit within 3 mo: Visit date not found  Next physical within 3 mo: Visit date not found      Stuart Mike, [facility] Triage/Med Refill 3/12/2021    Requested Prescriptions   Pending Prescriptions Disp Refills     valACYclovir (VALTREX) 1000 MG tablet [Pharmacy Med Name: VALACYCLOVIR 1GM TABLETS] 30 tablet 0     Sig: TAKE 2 TABLETS BY MOUTH 1 TIME. REPEAT WITH 2 TABLETS IN 12 HOURS       Antivirals Refill Protocol Failed - 3/12/2021  9:24 AM        Failed - Renal function done in last year     Creatinine   Date Value Ref Range Status   01/02/2019 0.73 0.60 - 1.10 mg/dL Final             Failed - Visit with PCP or prescribing provider visit in past 12 months or next 3 months     Last office visit with prescriber/PCP: 4/24/2019 Ananda Lam DO OR same dept: Visit date not found OR same specialty: 4/24/2019 Ananda Lam DO  Last physical: Visit date not found Last MTM visit: Visit date not found   Next visit within 3 mo: Visit date not found  Next physical within 3 mo: Visit date not found  Prescriber OR PCP: Ananda Lam DO  Last diagnosis associated with med order: 1. Herpes Simplex Acute Gingivostomatitis  - valACYclovir (VALTREX) 1000 MG tablet [Pharmacy Med Name: VALACYCLOVIR 1GM TABLETS]; TAKE 2 TABLETS BY MOUTH 1 TIME. REPEAT WITH 2 TABLETS IN 12 HOURS  Dispense: 30 tablet; Refill: 0    2. H/O cold sores  - valACYclovir (VALTREX) 1000 MG tablet [Pharmacy Med Name: VALACYCLOVIR 1GM TABLETS]; TAKE 2 TABLETS BY MOUTH 1 TIME. REPEAT WITH 2 TABLETS IN 12 HOURS  Dispense: 30 tablet; Refill: 0    If protocol passes may refill for 12 months if within 3 months of last  provider visit (or a total of 15 months).             Passed - Patient does not have active pregnancy episode        Passed - Patient has not had positive pregnancy test in last 280 days     Pregnancy Test, Urine   Date Value Ref Range Status   01/25/2010 Negative  Final

## 2021-06-15 NOTE — PROGRESS NOTES
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  No history of psoriasis herself or the family.  She has hypermobility.  She has found hydroxychloroquine helpful.  She has had eyes examined recently.  These with noted to be normal.  She is going to continue HC Q as now.  Return for follow-up in 6 months.  Labs today as noted.  Refills provided.      Diagnoses and all orders for this visit:    Polyarthritis Of The Hand  -     hydroxychloroquine (PLAQUENIL) 200 mg tablet; Take 1 tablet (200 mg total) by mouth 2 (two) times a day.  Dispense: 180 tablet; Refill: 1  -     ALT (SGPT)  -     Albumin  -     Creatinine  -     HM2(CBC w/o Differential)    High risk medication use  -     ALT (SGPT)  -     Albumin  -     Creatinine  -     HM2(CBC w/o Differential)    Generalized Osteoarthritis Of The Hand    Arthralgias In Multiple Sites  -     hydroxychloroquine (PLAQUENIL) 200 mg tablet; Take 1 tablet (200 mg total) by mouth 2 (two) times a day.  Dispense: 180 tablet; Refill: 1    Hypermobility arthralgia          HISTORY OF PRESENTING ILLNESS:  Mariluz Duron 42 y.o. is here for follow up of arthralgias due to severe, premature, erosive, inflammatory osteoarthritis.. She is noted discomfort in her left foot.  This is epicentered at the second toe, and the DIP area.  She rated this as moderately severe.  Worse with activity.  This is been gradually growing.  The whole finger or toe has not swollen.  She noted the pain level 3.0/10.  She has difficulty performing some of the day-to-day activities of that she can do without.  Morning stiffness noted at an hour.  Joints affected include multiple joints. This has gone on for several years . Pain is described as aching. It is infrequent now.  Her symptoms are now mild. The symptoms are gradually improving. Symptoms include pain, tenderness to touch, " swelling, redness.  Treatment to date has been with significant relief.   She does not have a history of psoriasis, ulcerative enteritis Crohn's disease. She has noted significant improvement.  There is very little redness, swelling or stiffness in her joints.  She noted the moderately severe discomfort 2.0/10.  She sometimes gets back pain.  She is able to do most of her day-to-day activities without difficulty.  She noted right hip area pain.  This is with certain movements.  She's been to orthopedics.  It sounds as if she has had a diagnosis of labral tear.  She has deferred surgical intervention.  Further historical information, including ROS and limitation in activities as noted in the multidimensional health assessment questionnaire scanned in the EMR and in the assessment and plan section.  There is no family history or personal history of psoriasis.    ALLERGIES:Review of patient's allergies indicates no known allergies.    PAST MEDICAL/ACTIVE PROBLEMS/MEDICATION/SOCIAL DATA  Past Medical History:   Diagnosis Date     Gastroesophageal reflux      Prediabetes      History   Smoking Status     Former Smoker   Smokeless Tobacco     Never Used     Patient Active Problem List   Diagnosis     Prediabetes     Herpes Simplex Acute Gingivostomatitis     Joint Pain Fingers     Polyarthritis Of The Hand     Arthralgias In Multiple Sites     Generalized Osteoarthritis Of The Hand     High risk medication use     History of gestational diabetes     Gastroesophageal reflux disease without esophagitis     BMI 33.0-33.9,adult     Avitaminosis D     Current Outpatient Prescriptions   Medication Sig Dispense Refill     metFORMIN (GLUCOPHAGE-XR) 500 MG 24 hr tablet Take 1 tablet (500 mg total) by mouth daily with supper. 90 tablet 0     phentermine (ADIPEX-P) 37.5 mg tablet Take 0.5 tablets (18.75 mg total) by mouth 2 (two) times a day. 30 tablet 0     valACYclovir (VALTREX) 1000 MG tablet TAKE 2 TABLETS BY MOUTH AT ONCE. THEN  "REPEAT WITH 2 TABLETS IN 12 HOURS 30 tablet 0     hydroxychloroquine (PLAQUENIL) 200 mg tablet Take 1 tablet (200 mg total) by mouth 2 (two) times a day. 60 tablet 5     No current facility-administered medications for this visit.      DETAILED EXAMINATION  02/06/18  :  Vitals:    02/06/18 1030   BP: 126/70   Pulse: 80   Weight: 200 lb 3.2 oz (90.8 kg)   Height: 5' 4\" (1.626 m)     Alert oriented. Head including the face is examined for malar rash, heliotropes, scarring, lupus pernio. Eyes examined for redness such as in episcleritis/scleritis, periorbital lesions.   Neck/ Face examined for parotid gland swelling, range of motion of neck.  Left upper and lower and right upper and lower extremities examined for tenderness, swelling, warmth of the appendicular joints, range of motion, edema, rash.  Some of the important findings included: She has marked Heberden nodes scattered including the index finger.  She has tender DIP with bony swelling of her left second toe.  There is no dactylitis however.  She is tender in the first MTP of the same side more so than the opposite.  She does not have nail pitting.  There is no synovitis in any of the palpable appendical joints.           LAB / IMAGING DATA:  ALT   Date Value Ref Range Status   08/25/2016 19 0 - 45 U/L Final   07/08/2014 12 0 - 45 U/L Final     Albumin   Date Value Ref Range Status   08/25/2016 3.8 3.5 - 5.0 g/dL Final   07/08/2014 3.7 3.5 - 5.0 g/dL Final     Creatinine   Date Value Ref Range Status   08/25/2016 0.74 0.60 - 1.10 mg/dL Final   07/08/2014 0.74 0.60 - 1.10 mg/dL Final   04/17/2014 0.8 0.6 - 1.3 mg/dL Final       WBC   Date Value Ref Range Status   08/25/2016 6.7 4.0 - 11.0 thou/uL Final   07/08/2014 8.5 4.0 - 11.0 thou/uL Final     Hemoglobin   Date Value Ref Range Status   08/25/2016 14.7 12.0 - 16.0 g/dL Final   07/08/2014 13.4 12.0 - 16.0 g/dL Final   01/25/2010 13.0 12.0 - 16.0 g/dL Final     Platelets   Date Value Ref Range Status "   08/25/2016 249 140 - 440 thou/uL Final   07/08/2014 203 140 - 440 thou/uL Final       Lab Results   Component Value Date    RF <15 04/17/2014    SEDRATE 18 04/17/2014

## 2021-06-15 NOTE — TELEPHONE ENCOUNTER
Patient is due for follow up in office. 30 day refill sent to pharmacy on file. Please schedule follow up prior to next refill. Thank you.

## 2021-06-16 PROBLEM — M25.50 HYPERMOBILITY ARTHRALGIA: Status: ACTIVE | Noted: 2018-02-06

## 2021-06-16 NOTE — PROGRESS NOTES
PLAN  Follow up in 1 month.  Continue medications.  Continue supplements.  This month concentrate on increasing exercise    Prediabetes  Diet and exercise reviewed.  Encouraged to start more routine exercise.  She has made a good choice and doing more food prep over the past week.  She is also has a agreement with her family that they are going to keep eating out to only once a month.    BMI 33.0-33.9,adult  Discussed food prep.  Will continue phentermine.  Follow-up in 1 month.        Initial Weight: 235 lbs  Weight: 211 lb 3.2 oz (95.8 kg)  Weight loss from initial: 23.8  % Weight loss: 10.13 %    Are you experiencing any side effects to the medications:  No  Hunger control:  good  Exercise was discussed: yes  Taking supplements:  yes  Discussed journaling food:  yes  Patient is pleased with the current results:  yes  The patient is following the nutrition plan:  yes  Barriers to losing weight:  Behavior:  Amnesia Calories:   stress (comfort)    Vitals:    02/22/18 0744   BP: 118/64   Pulse: 64   Resp: 12   Temp: 98.3  F (36.8  C)     General: Alert and oriented ×3  Heart: Regular rate and rhythm without murmur or gallop  Lungs: Clear to auscultation bilateral  Extremities: No cyanosis or edema

## 2021-06-17 NOTE — PROGRESS NOTES
PLAN  Follow up in 1 month.  Continue medications.  Continue supplements.  This month concentrate on journaling and increasing exercise    Prediabetes  Held stable. Discussed increase in exercise. Continue food prep.        Initial Weight: 235 lbs  Weight: 212 lb 14.4 oz (96.6 kg)  Weight loss from initial: 22.1  % Weight loss: 9.4 %    Are you experiencing any side effects to the medications:  No  Hunger control:  good  Exercise was discussed: yes  Taking supplements:  yes  Discussed journaling food:  yes  Patient is pleased with the current results:  yes  The patient is following the nutrition plan:  yes  Barriers to losing weight:  Behavior:  Amnesia Calories:   habit    Vitals:    04/05/18 0844   BP: 108/62   Pulse: 64   Resp: 12   Temp: 98.3  F (36.8  C)     General: Alert and oriented ×3  Heart: Regular rate and rhythm without murmur or gallop  Lungs: Clear to auscultation bilateral  Extremities: No cyanosis or edema

## 2021-06-17 NOTE — PROGRESS NOTES
PLAN  Follow up in 1 month.  Continue medications.  Continue supplements.  This month concentrate on journaling and education    Prediabetes  Might increase in weight.  It has been over 18 months since she had the educational class and she admits she does not remember most of it.  As such will send her back through the educational portion.  Follow-up in 1 month.  Continue phentermine and metformin at current dosing.        Initial Weight: 235 lbs  Weight: 216 lb 1.6 oz (98 kg)  Weight loss from initial: 18.9  % Weight loss: 8.04 %    Are you experiencing any side effects to the medications:  No  Hunger control:  good  Exercise was discussed: yes  Taking supplements:  yes  Discussed journaling food:  yes  Patient is pleased with the current results:  no  The patient is following the nutrition plan: She thinks she is, but also admits that she does remember all the aspects.  Barriers to losing weight:  Behavior:  Amnesia Calories:   stress (comfort)    Vitals:    05/10/18 0843   BP: 116/68   Pulse: 72   Resp: 12   Temp: 98.3  F (36.8  C)     General: Alert and oriented ×3  Heart: Regular rate and rhythm without murmur or gallop  Lungs: Clear to auscultation bilateral  Extremities: No cyanosis or edema

## 2021-06-22 NOTE — PROGRESS NOTES
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  No history of psoriasis herself or the family.  She has hypermobility.  Hydroxychloroquine is continue to provide her relief.  She checks her eyes every 6 months.  Labs today, will ask her to return for follow-up here in 12  months.          Diagnoses and all orders for this visit:    Polyarthritis Of The Hand  -     hydroxychloroquine (PLAQUENIL) 200 mg tablet  Dispense: 180 tablet; Refill: 0  -     ALT (SGPT)  -     Albumin  -     Creatinine  -     HM2(CBC w/o Differential)    Arthralgias In Multiple Sites  -     hydroxychloroquine (PLAQUENIL) 200 mg tablet  Dispense: 180 tablet; Refill: 0    Generalized Osteoarthritis Of The Hand    Hypermobility arthralgia          HISTORY OF PRESENTING ILLNESS:  Mariluz Duron 43 y.o. is here for follow up of arthralgias due to severe, premature, erosive, inflammatory osteoarthritis.  She is noted pain level at 2.0, mild.  She is able to do all her day-to-day activities.  She had her eyes examined 6 months ago.  She is taking hydroxychloroquine regular basis.  She noted morning stiffness is down to 20 minutes.  There is been no fever weight loss blurry vision eye redness mouth ulcer nausea cough or rash.  She has a once again not had a family history of psoriasis ulcerative colitis Crohn's disease.  She has hyper mobility likely contributing to her discomfort.  She sometimes gets back pain.  She is able to do most of her day-to-day activities without difficulty.  She noted right hip area pain.  This is with certain movements.  She's been to orthopedics.  It sounds as if she has had a diagnosis of labral tear.  She has deferred surgical intervention.  Further historical information, including ROS and limitation in activities as noted in the multidimensional health assessment questionnaire scanned in " the EMR and in the assessment and plan section.  There is no family history or personal history of psoriasis.    ALLERGIES:Patient has no known allergies.    PAST MEDICAL/ACTIVE PROBLEMS/MEDICATION/SOCIAL DATA  Past Medical History:   Diagnosis Date     Gastroesophageal reflux      Prediabetes      Social History     Tobacco Use   Smoking Status Former Smoker   Smokeless Tobacco Never Used     Patient Active Problem List   Diagnosis     Prediabetes     Herpes Simplex Acute Gingivostomatitis     Joint Pain Fingers     Polyarthritis Of The Hand     Arthralgias In Multiple Sites     Generalized Osteoarthritis Of The Hand     High risk medication use     History of gestational diabetes     Gastroesophageal reflux disease without esophagitis     Severe obesity (BMI 35.0-39.9) with comorbidity (H)     Avitaminosis D     Hypermobility arthralgia     Current Outpatient Medications   Medication Sig Dispense Refill     hydroxychloroquine (PLAQUENIL) 200 mg tablet TAKE 1 TABLET BY MOUTH TWICE DAILY 60 tablet 0     metFORMIN (GLUCOPHAGE-XR) 500 MG 24 hr tablet Take 1 tablet (500 mg total) by mouth daily with supper. 90 tablet 1     phentermine (ADIPEX-P) 37.5 mg tablet Take 0.5 tablets (18.75 mg total) by mouth 2 (two) times a day. 30 tablet 0     valACYclovir (VALTREX) 1000 MG tablet TAKE 2 TABLETS BY MOUTH AT ONCE. THEN REPEAT WITH 2 TABLETS IN 12 HOURS 30 tablet 0     No current facility-administered medications for this visit.      DETAILED EXAMINATION  01/02/19  :  Vitals:    01/02/19 1150   BP: 102/78   Patient Site: Right Arm   Patient Position: Sitting   Cuff Size: Adult Regular   Pulse: 68   Weight: (!) 230 lb (104.3 kg)     Alert oriented. Head including the face is examined for malar rash, heliotropes, scarring, lupus pernio. Eyes examined for redness such as in episcleritis/scleritis, periorbital lesions.   Neck/ Face examined for parotid gland swelling, range of motion of neck.  Left upper and lower and right upper  and lower extremities examined for tenderness, swelling, warmth of the appendicular joints, range of motion, edema, rash.  Some of the important findings included: She has Heberden's, most painful at the left fifth digit nails are painted.  No JLT effusion or warmth of the knees.  There is no dactylitis of the digits.                LAB / IMAGING DATA:  ALT   Date Value Ref Range Status   02/06/2018 18 0 - 45 U/L Final   08/25/2016 19 0 - 45 U/L Final   07/08/2014 12 0 - 45 U/L Final     Albumin   Date Value Ref Range Status   02/06/2018 3.8 3.5 - 5.0 g/dL Final   08/25/2016 3.8 3.5 - 5.0 g/dL Final   07/08/2014 3.7 3.5 - 5.0 g/dL Final     Creatinine   Date Value Ref Range Status   02/06/2018 0.70 0.60 - 1.10 mg/dL Final   08/25/2016 0.74 0.60 - 1.10 mg/dL Final   07/08/2014 0.74 0.60 - 1.10 mg/dL Final       WBC   Date Value Ref Range Status   02/06/2018 6.3 4.0 - 11.0 thou/uL Final   08/25/2016 6.7 4.0 - 11.0 thou/uL Final   07/08/2014 8.5 4.0 - 11.0 thou/uL Final     Hemoglobin   Date Value Ref Range Status   02/06/2018 14.1 12.0 - 16.0 g/dL Final   08/25/2016 14.7 12.0 - 16.0 g/dL Final   07/08/2014 13.4 12.0 - 16.0 g/dL Final     Platelets   Date Value Ref Range Status   02/06/2018 206 140 - 440 thou/uL Final   08/25/2016 249 140 - 440 thou/uL Final   07/08/2014 203 140 - 440 thou/uL Final       Lab Results   Component Value Date    RF <15 04/17/2014    SEDRATE 18 04/17/2014

## 2021-06-24 NOTE — PROGRESS NOTES
PLAN  Continue medications.  Continue supplements.  This month concentrate on journaling and increasing exercise  Follow-up in 2 months.  Continue metformin and phentermine.    Severe obesity (BMI 35.0-39.9) with comorbidity (H)  Continue metformin and phentermine.  She did start tracking foods a week and a half ago.  Encouraged to continue tracking.  Also discussed how to increase exercise with a rowing machine slowly so is pain or injury.        Initial Weight: 235 lbs  Weight: (!) 234 lb 14.4 oz (106.5 kg)  Weight loss from initial: 0.1  % Weight loss: 0.04 %      Are you experiencing any side effects to the medications:  No  Hunger control:  good  Exercise was discussed: yes, did buy a rowing machine in the past month and is working out on it 10 minutes every day.  Taking supplements:  yes  Discussed journaling food:  yes, restarted journaling in the past week and a half as well as reducing calories back down to 1200.  Patient is pleased with the current results:  yes  The patient is following the nutrition plan:  yes  Barriers to losing weight:  Behavior:  social calories    Vitals:    02/13/19 0951   BP: 128/88   Pulse: 80   Resp: 12   Temp: 98.4  F (36.9  C)     General: Alert and oriented ×3  Heart: Regular rate and rhythm without murmur or gallop  Lungs: Clear to auscultation bilateral  Extremities: No cyanosis or edema

## 2021-08-10 ENCOUNTER — TRANSFERRED RECORDS (OUTPATIENT)
Dept: HEALTH INFORMATION MANAGEMENT | Facility: CLINIC | Age: 46
End: 2021-08-10

## 2021-08-21 ENCOUNTER — HEALTH MAINTENANCE LETTER (OUTPATIENT)
Age: 46
End: 2021-08-21

## 2021-08-29 ENCOUNTER — APPOINTMENT (OUTPATIENT)
Dept: RADIOLOGY | Facility: HOSPITAL | Age: 46
End: 2021-08-29
Attending: EMERGENCY MEDICINE
Payer: COMMERCIAL

## 2021-08-29 ENCOUNTER — HOSPITAL ENCOUNTER (EMERGENCY)
Facility: HOSPITAL | Age: 46
Discharge: HOME | End: 2021-08-30
Attending: EMERGENCY MEDICINE | Admitting: EMERGENCY MEDICINE
Payer: COMMERCIAL

## 2021-08-29 DIAGNOSIS — S46.911A SHOULDER STRAIN, RIGHT, INITIAL ENCOUNTER: ICD-10-CM

## 2021-08-29 DIAGNOSIS — M25.519 ANTERIOR SHOULDER PAIN: ICD-10-CM

## 2021-08-29 PROCEDURE — 96375 TX/PRO/DX INJ NEW DRUG ADDON: CPT

## 2021-08-29 PROCEDURE — 250N000011 HC RX IP 250 OP 636: Performed by: EMERGENCY MEDICINE

## 2021-08-29 PROCEDURE — 99285 EMERGENCY DEPT VISIT HI MDM: CPT | Mod: 25

## 2021-08-29 PROCEDURE — 73030 X-RAY EXAM OF SHOULDER: CPT | Mod: RT

## 2021-08-29 PROCEDURE — 96374 THER/PROPH/DIAG INJ IV PUSH: CPT

## 2021-08-29 RX ORDER — KETOROLAC TROMETHAMINE 15 MG/ML
15 INJECTION, SOLUTION INTRAMUSCULAR; INTRAVENOUS ONCE
Status: COMPLETED | OUTPATIENT
Start: 2021-08-30 | End: 2021-08-29

## 2021-08-29 RX ORDER — FENTANYL CITRATE 50 UG/ML
25 INJECTION, SOLUTION INTRAMUSCULAR; INTRAVENOUS ONCE
Status: COMPLETED | OUTPATIENT
Start: 2021-08-30 | End: 2021-08-29

## 2021-08-29 RX ADMIN — FENTANYL CITRATE 25 MCG: 50 INJECTION, SOLUTION INTRAMUSCULAR; INTRAVENOUS at 23:44

## 2021-08-29 RX ADMIN — KETOROLAC TROMETHAMINE 15 MG: 15 INJECTION, SOLUTION INTRAMUSCULAR; INTRAVENOUS at 23:45

## 2021-08-29 ASSESSMENT — MIFFLIN-ST. JEOR: SCORE: 1713.63

## 2021-08-29 ASSESSMENT — ENCOUNTER SYMPTOMS
ARTHRALGIAS: 1
NUMBNESS: 0

## 2021-08-29 NOTE — Clinical Note
Mariluz Duron was seen and treated in our emergency department on 8/29/2021.  She may return to work on 09/02/2021.  If cleared by Orthopedics for regular work activities or with limitations per their recommendations.     If you have any questions or concerns, please don't hesitate to call.      Ananda Ferrell MD

## 2021-08-30 VITALS
DIASTOLIC BLOOD PRESSURE: 114 MMHG | TEMPERATURE: 97.3 F | HEART RATE: 69 BPM | RESPIRATION RATE: 20 BRPM | HEIGHT: 64 IN | SYSTOLIC BLOOD PRESSURE: 248 MMHG | BODY MASS INDEX: 40.97 KG/M2 | WEIGHT: 240 LBS | OXYGEN SATURATION: 95 %

## 2021-08-30 RX ORDER — IBUPROFEN 600 MG/1
600 TABLET, FILM COATED ORAL EVERY 6 HOURS PRN
Qty: 20 TABLET | Refills: 0 | Status: SHIPPED | OUTPATIENT
Start: 2021-08-30 | End: 2023-05-12

## 2021-08-30 NOTE — ED PROVIDER NOTES
NAME: Mariluz Duron  AGE: 46 year old female  YOB: 1975  MRN: [id number]  EVALUATION DATE & TIME: 2021 11:27 PM    PCP: Ananda Lam    ED PROVIDER: Ananda Ferrell M.D.      Chief Complaint   Patient presents with     Shoulder Pain     FINAL IMPRESSION:  1. Anterior shoulder pain    2. Shoulder strain, right, initial encounter      MEDICAL DECISION MAKIN:29 PM I met with the patient, obtained history, performed an initial exam, and discussed options and plan for diagnostics and treatment here in the ED.   12:22 AM I spoke to the patient regarding her Xray results that showed no breaks with normal alignment.    Patient was clinically assessed and consented to treatment. After assessment, medical decision making and workup were discussed with the patient. The patient was agreeable to plan for testing, workup, and treatment.  Pertinent Labs & Imaging studies reviewed. (See chart for details)       Mariluz Duron is a 46 year oldfemale who presents with right shoulder injury.   Differential diagnosis includes but not limited to shoulder dislocation, humeral head fracture, rotator cuff tear, AC separation, shoulder strain.  Patient with sharp pain upon lowering herself in the bathtub.  Patient did have her arm abducted and did feel pain sharply but able to move her arm afterwards though painful more anteriorly.  It is possible she dislocated and is still able to move it however does not feel displaced anteriorly on my palpation.  She is tender over the anterior capsule and could be a a rotator cuff or AC injury.  Patient will be plan for x-ray.  Pain medication was given and patient was sent for x-ray.  Pain was controlled after small dose of fentanyl and then Toradol.  Patient able to abduct and flex shoulder though painful on anterior aspect with anterior flexion.  As stated no deformity noted.  X-ray was done and showed no fracture or dislocation.  Patient  in  the anterior part of the capsule which could be possible rotator cuff injury.  Patient will be kept in sling and given range of motion exercises as well as ice, rest, ibuprofen and follow-up with orthopedics in 1 to 2 days if symptoms not dramatically improving in the next 24 hours.  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  Patient given note for work and will be recommended for clearance from orthopedics before returning to full active duties.    The importance of close follow up was discussed. We reviewed warning signs and symptoms, and I instructed Ms. Duron to return to the emergency department immediately if she develops any new or worsening symptoms. I provided additional verbal discharge instructions. Ms. Duron expressed understanding and agreement with this plan of care, her questions were answered, and she was discharged in stable condition.       0 minutes of critical care time    MEDICATIONS GIVEN IN THE EMERGENCY:  Medications   fentaNYL (PF) (SUBLIMAZE) injection 25 mcg (25 mcg Intravenous Given 8/29/21 2344)   ketorolac (TORADOL) injection 15 mg (15 mg Intravenous Given 8/29/21 2345)       NEW PRESCRIPTIONS STARTED AT TODAY'S ER VISIT:  Discharge Medication List as of 8/30/2021 12:34 AM      START taking these medications    Details   ibuprofen (ADVIL/MOTRIN) 600 MG tablet Take 1 tablet (600 mg) by mouth every 6 hours as needed for moderate pain, Disp-20 tablet, R-0, Local Print                =================================================================    HPI    Patient information was obtained from: Patient    Use of : N/A       Mariluz Duron is a 46 year old female with a past medical history of arthralgias in multiple sites and severe obesity,  who presents via private car for evalution of a right shoulder injury.    Patient reports lower herself into the bathtub at home tonight an hour  "ago(2238) and feeling like her shoulder popped out of place. She reports continuous pain in her right anterior shoulder, saying that she can't lift her arm up in from of herself because it makes the pain much worse. Patient notes that she lowered herself all the way down into the tub to the point where both her arms were behind her body. She adds that her shoulder doesn't feel \"out\" and denies any past injuries in her shoulder. Patient denies numbness, tingling, and elbow pain. No other complaints at this time.     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 She is  and her  dropped her off at the ED. Patient is also a past smoker and a alcohol user    REVIEW OF SYSTEMS   Review of Systems   Musculoskeletal: Positive for arthralgias (right anterior shoulder pain).        No elbow pain   Neurological: Negative for numbness (or tingling).   All other systems reviewed and are negative.     PAST MEDICAL HISTORY:  No past medical history on file.    PAST SURGICAL HISTORY:  Past Surgical History:   Procedure Laterality Date     LASER ABLATION       OVARIAN CYST REMOVAL         CURRENT MEDICATIONS:    No current facility-administered medications for this encounter.    Current Outpatient Medications:      ibuprofen (ADVIL/MOTRIN) 600 MG tablet, Take 1 tablet (600 mg) by mouth every 6 hours as needed for moderate pain, Disp: 20 tablet, Rfl: 0     hydroxychloroquine (PLAQUENIL) 200 mg tablet, [HYDROXYCHLOROQUINE (PLAQUENIL) 200 MG TABLET] Take 1 tablet (200 mg total) by mouth 2 (two) times a day., Disp: 180 tablet, Rfl: 0     metFORMIN (GLUCOPHAGE-XR) 500 MG 24 hr tablet, [METFORMIN (GLUCOPHAGE-XR) 500 MG 24 HR TABLET] Take 1 tablet (500 mg total) by mouth daily with supper., Disp: 90 tablet, Rfl: 1     phentermine (ADIPEX-P) 37.5 mg tablet, [PHENTERMINE (ADIPEX-P) 37.5 MG TABLET] Take 0.5 tablets (18.75 mg total) by mouth 2 (two) times a day., Disp: 30 tablet, Rfl: 0     " valACYclovir (VALTREX) 1000 MG tablet, [VALACYCLOVIR (VALTREX) 1000 MG TABLET] TAKE 2 TABLETS BY MOUTH 1 TIME. REPEAT WITH 2 TABLETS IN 12 HOURS, Disp: 30 tablet, Rfl: 0    ALLERGIES:  No Known Allergies    FAMILY HISTORY:  Family History   Problem Relation Age of Onset     Diabetes Mother      Osteoporosis Mother      Sleep Apnea Mother      Hyperlipidemia Father      Hypertension Father      Obesity Brother      No Known Problems Daughter      No Known Problems Son      Heart Disease Paternal Grandmother      Breast Cancer No family hx of      Colon Cancer No family hx of      Prostate Cancer No family hx of      Ovarian Cancer No family hx of      Skin Cancer No family hx of        SOCIAL HISTORY:   Social History     Socioeconomic History     Marital status:      Spouse name: Not on file     Number of children: Not on file     Years of education: Not on file     Highest education level: Not on file   Occupational History     Not on file   Tobacco Use     Smoking status: Former Smoker     Smokeless tobacco: Never Used   Substance and Sexual Activity     Alcohol use: Yes     Comment: Alcoholic Drinks/day: occasionally     Drug use: No     Sexual activity: Yes     Partners: Male     Birth control/protection: Surgical   Other Topics Concern     Not on file   Social History Narrative     Not on file     Social Determinants of Health     Financial Resource Strain:      Difficulty of Paying Living Expenses:    Food Insecurity:      Worried About Running Out of Food in the Last Year:      Ran Out of Food in the Last Year:    Transportation Needs:      Lack of Transportation (Medical):      Lack of Transportation (Non-Medical):    Physical Activity:      Days of Exercise per Week:      Minutes of Exercise per Session:    Stress:      Feeling of Stress :    Social Connections:      Frequency of Communication with Friends and Family:      Frequency of Social Gatherings with Friends and Family:      Attends [employer]  "Services:      Active Member of Clubs or Organizations:      Attends Club or Organization Meetings:      Marital Status:    Intimate Partner Violence:      Fear of Current or Ex-Partner:      Emotionally Abused:      Physically Abused:      Sexually Abused:      PHYSICAL EXAM:    Vitals: BP (!) 248/114   Pulse 69   Temp 97.3  F (36.3  C) (Oral)   Resp 20   Ht 1.626 m (5' 4\")   Wt 108.9 kg (240 lb)   SpO2 95%   BMI 41.20 kg/m     Physical Exam  Vitals and nursing note reviewed.   Constitutional:       General: She is not in acute distress.     Appearance: Normal appearance. She is not ill-appearing or toxic-appearing.   HENT:      Head: Atraumatic.   Cardiovascular:      Pulses: Normal pulses.   Pulmonary:      Effort: Pulmonary effort is normal. No respiratory distress.   Musculoskeletal:         General: Tenderness and signs of injury present. No deformity.      Right shoulder: Tenderness and bony tenderness present. No swelling, deformity, effusion, laceration or crepitus. Decreased range of motion (Secondary to pain in flexion.). Normal strength. Normal pulse.      Cervical back: Normal range of motion.   Skin:     General: Skin is warm and dry.      Capillary Refill: Capillary refill takes less than 2 seconds.      Coloration: Skin is not pale.      Findings: No erythema.   Neurological:      General: No focal deficit present.      Mental Status: She is alert.   Psychiatric:         Mood and Affect: Mood normal.        LAB:  All pertinent labs reviewed and interpreted.  Labs Ordered and Resulted from Time of ED Arrival Up to the Time of Departure from the ED - No data to display    RADIOLOGY:  XR Shoulder Right 3 Views   Final Result   IMPRESSION: No fracture. Normal alignment.         PROCEDURES:   Procedures       I, Sy Webster, am serving as a scribe to document services personally performed by Dr. Ananda Ferrell  based on my observation and the provider's statements to me. I, Ananda Ferrell, " MD attest that Sy Webster is acting in a scribe capacity, has observed my performance of the services and has documented them in accordance with my direction.      Ananda Ferrell M.D.  Emergency Medicine  [facility] EMERGENCY DEPARTMENT  [address] [place] [id number]  [phone]  Dept: [phone]       Ananda Ferrell MD  08/30/21 0135

## 2021-08-30 NOTE — ED TRIAGE NOTES
Right shoulder injury about 20mins PTA, states she was lowering herself into a hot-tube and had extreme pain in shoulder.

## 2021-08-31 ENCOUNTER — TRANSFERRED RECORDS (OUTPATIENT)
Dept: HEALTH INFORMATION MANAGEMENT | Facility: CLINIC | Age: 46
End: 2021-08-31

## 2021-10-16 ENCOUNTER — HEALTH MAINTENANCE LETTER (OUTPATIENT)
Age: 46
End: 2021-10-16

## 2022-01-03 ENCOUNTER — TRANSFERRED RECORDS (OUTPATIENT)
Dept: HEALTH INFORMATION MANAGEMENT | Facility: CLINIC | Age: 47
End: 2022-01-03
Payer: COMMERCIAL

## 2022-01-28 ENCOUNTER — TRANSFERRED RECORDS (OUTPATIENT)
Dept: HEALTH INFORMATION MANAGEMENT | Facility: CLINIC | Age: 47
End: 2022-01-28
Payer: COMMERCIAL

## 2022-03-18 ENCOUNTER — TRANSFERRED RECORDS (OUTPATIENT)
Dept: HEALTH INFORMATION MANAGEMENT | Facility: CLINIC | Age: 47
End: 2022-03-18
Payer: COMMERCIAL

## 2022-10-01 ENCOUNTER — HEALTH MAINTENANCE LETTER (OUTPATIENT)
Age: 47
End: 2022-10-01

## 2023-04-10 ENCOUNTER — HOSPITAL ENCOUNTER (OUTPATIENT)
Dept: MAMMOGRAPHY | Facility: CLINIC | Age: 48
Discharge: HOME | End: 2023-04-10
Attending: FAMILY MEDICINE | Admitting: FAMILY MEDICINE
Payer: COMMERCIAL

## 2023-04-10 DIAGNOSIS — Z12.31 VISIT FOR SCREENING MAMMOGRAM: ICD-10-CM

## 2023-04-10 PROCEDURE — 77067 SCR MAMMO BI INCL CAD: CPT

## 2023-04-25 ENCOUNTER — ANCILLARY PROCEDURE (OUTPATIENT)
Dept: MAMMOGRAPHY | Facility: CLINIC | Age: 48
End: 2023-04-25
Attending: FAMILY MEDICINE
Payer: COMMERCIAL

## 2023-04-25 DIAGNOSIS — N64.89 BREAST ASYMMETRY: ICD-10-CM

## 2023-04-25 PROCEDURE — 77061 BREAST TOMOSYNTHESIS UNI: CPT | Mod: LT

## 2023-04-28 ENCOUNTER — TRANSFERRED RECORDS (OUTPATIENT)
Dept: HEALTH INFORMATION MANAGEMENT | Facility: CLINIC | Age: 48
End: 2023-04-28

## 2023-05-11 ASSESSMENT — ENCOUNTER SYMPTOMS
ARTHRALGIAS: 1
DYSURIA: 1
COUGH: 0
JOINT SWELLING: 1
HEARTBURN: 0
PARESTHESIAS: 0
CONSTIPATION: 0
EYE PAIN: 0
HEADACHES: 0
MYALGIAS: 1
SORE THROAT: 0
FEVER: 0
NAUSEA: 0
NERVOUS/ANXIOUS: 0
PALPITATIONS: 0
BREAST MASS: 0
SHORTNESS OF BREATH: 0
ABDOMINAL PAIN: 0
FREQUENCY: 1
DIZZINESS: 0
CHILLS: 0
HEMATURIA: 0
DIARRHEA: 0
HEMATOCHEZIA: 0
WEAKNESS: 0

## 2023-05-12 ENCOUNTER — OFFICE VISIT (OUTPATIENT)
Dept: FAMILY MEDICINE | Facility: CLINIC | Age: 48
End: 2023-05-12
Payer: COMMERCIAL

## 2023-05-12 ENCOUNTER — MYC MEDICAL ADVICE (OUTPATIENT)
Dept: FAMILY MEDICINE | Facility: CLINIC | Age: 48
End: 2023-05-12

## 2023-05-12 VITALS
HEIGHT: 64 IN | OXYGEN SATURATION: 97 % | WEIGHT: 232.1 LBS | DIASTOLIC BLOOD PRESSURE: 102 MMHG | HEART RATE: 71 BPM | RESPIRATION RATE: 16 BRPM | BODY MASS INDEX: 39.63 KG/M2 | SYSTOLIC BLOOD PRESSURE: 161 MMHG

## 2023-05-12 DIAGNOSIS — K21.9 GASTROESOPHAGEAL REFLUX DISEASE WITHOUT ESOPHAGITIS: ICD-10-CM

## 2023-05-12 DIAGNOSIS — Z11.59 NEED FOR HEPATITIS C SCREENING TEST: ICD-10-CM

## 2023-05-12 DIAGNOSIS — Z13.29 SCREENING FOR THYROID DISORDER: ICD-10-CM

## 2023-05-12 DIAGNOSIS — Z11.4 SCREENING FOR HIV (HUMAN IMMUNODEFICIENCY VIRUS): ICD-10-CM

## 2023-05-12 DIAGNOSIS — M25.50 PAIN IN JOINT, MULTIPLE SITES: ICD-10-CM

## 2023-05-12 DIAGNOSIS — R03.0 ELEVATED BLOOD PRESSURE READING IN OFFICE WITHOUT DIAGNOSIS OF HYPERTENSION: ICD-10-CM

## 2023-05-12 DIAGNOSIS — Z86.19 H/O COLD SORES: ICD-10-CM

## 2023-05-12 DIAGNOSIS — Z12.11 SCREEN FOR COLON CANCER: ICD-10-CM

## 2023-05-12 DIAGNOSIS — Z12.4 CERVICAL CANCER SCREENING: ICD-10-CM

## 2023-05-12 DIAGNOSIS — E78.5 HYPERLIPIDEMIA LDL GOAL <70: Primary | ICD-10-CM

## 2023-05-12 DIAGNOSIS — E66.01 SEVERE OBESITY (BMI 35.0-39.9) WITH COMORBIDITY (H): ICD-10-CM

## 2023-05-12 DIAGNOSIS — Z13.220 SCREENING FOR LIPID DISORDERS: ICD-10-CM

## 2023-05-12 DIAGNOSIS — Z13.1 SCREENING FOR DIABETES MELLITUS: ICD-10-CM

## 2023-05-12 DIAGNOSIS — M13.0 POLYARTHROPATHY OR POLYARTHRITIS, HAND: ICD-10-CM

## 2023-05-12 DIAGNOSIS — R30.0 DYSURIA: ICD-10-CM

## 2023-05-12 DIAGNOSIS — E11.9 TYPE 2 DIABETES MELLITUS WITHOUT COMPLICATION, WITHOUT LONG-TERM CURRENT USE OF INSULIN (H): ICD-10-CM

## 2023-05-12 DIAGNOSIS — R73.03 PREDIABETES: ICD-10-CM

## 2023-05-12 DIAGNOSIS — E55.9 AVITAMINOSIS D: ICD-10-CM

## 2023-05-12 DIAGNOSIS — Z00.00 ROUTINE GENERAL MEDICAL EXAMINATION AT A HEALTH CARE FACILITY: Primary | ICD-10-CM

## 2023-05-12 DIAGNOSIS — B00.2 HERPETIC GINGIVOSTOMATITIS: ICD-10-CM

## 2023-05-12 LAB
ALBUMIN SERPL BCG-MCNC: 4.1 G/DL (ref 3.5–5.2)
ALBUMIN UR-MCNC: NEGATIVE MG/DL
ALP SERPL-CCNC: 99 U/L (ref 35–104)
ALT SERPL W P-5'-P-CCNC: 31 U/L (ref 10–35)
ANION GAP SERPL CALCULATED.3IONS-SCNC: 13 MMOL/L (ref 7–15)
APPEARANCE UR: CLEAR
AST SERPL W P-5'-P-CCNC: 22 U/L (ref 10–35)
BILIRUB SERPL-MCNC: 0.3 MG/DL
BILIRUB UR QL STRIP: NEGATIVE
BUN SERPL-MCNC: 14.7 MG/DL (ref 6–20)
CALCIUM SERPL-MCNC: 9.8 MG/DL (ref 8.6–10)
CHLORIDE SERPL-SCNC: 98 MMOL/L (ref 98–107)
CHOLEST SERPL-MCNC: 238 MG/DL
COLOR UR AUTO: YELLOW
CREAT SERPL-MCNC: 0.63 MG/DL (ref 0.51–0.95)
DEPRECATED HCO3 PLAS-SCNC: 25 MMOL/L (ref 22–29)
ERYTHROCYTE [DISTWIDTH] IN BLOOD BY AUTOMATED COUNT: 12.9 % (ref 10–15)
GFR SERPL CREATININE-BSD FRML MDRD: >90 ML/MIN/1.73M2
GLUCOSE SERPL-MCNC: 428 MG/DL (ref 70–99)
GLUCOSE UR STRIP-MCNC: 500 MG/DL
HBA1C MFR BLD: 10.8 % (ref 0–5.6)
HCT VFR BLD AUTO: 43.8 % (ref 35–47)
HDLC SERPL-MCNC: 36 MG/DL
HGB BLD-MCNC: 15.2 G/DL (ref 11.7–15.7)
HGB UR QL STRIP: NEGATIVE
KETONES UR STRIP-MCNC: NEGATIVE MG/DL
LDLC SERPL CALC-MCNC: 123 MG/DL
LEUKOCYTE ESTERASE UR QL STRIP: NEGATIVE
MCH RBC QN AUTO: 30.2 PG (ref 26.5–33)
MCHC RBC AUTO-ENTMCNC: 34.7 G/DL (ref 31.5–36.5)
MCV RBC AUTO: 87 FL (ref 78–100)
NITRATE UR QL: NEGATIVE
NONHDLC SERPL-MCNC: 202 MG/DL
PH UR STRIP: 7 [PH] (ref 5–8)
PLATELET # BLD AUTO: 256 10E3/UL (ref 150–450)
POTASSIUM SERPL-SCNC: 4.5 MMOL/L (ref 3.4–5.3)
PROT SERPL-MCNC: 7.3 G/DL (ref 6.4–8.3)
RBC # BLD AUTO: 5.04 10E6/UL (ref 3.8–5.2)
SODIUM SERPL-SCNC: 136 MMOL/L (ref 136–145)
SP GR UR STRIP: 1.01 (ref 1–1.03)
TRIGL SERPL-MCNC: 397 MG/DL
TSH SERPL DL<=0.005 MIU/L-ACNC: 1.49 UIU/ML (ref 0.3–4.2)
UROBILINOGEN UR STRIP-ACNC: 0.2 E.U./DL
WBC # BLD AUTO: 7.9 10E3/UL (ref 4–11)

## 2023-05-12 PROCEDURE — 81003 URINALYSIS AUTO W/O SCOPE: CPT

## 2023-05-12 PROCEDURE — 82306 VITAMIN D 25 HYDROXY: CPT

## 2023-05-12 PROCEDURE — 85027 COMPLETE CBC AUTOMATED: CPT

## 2023-05-12 PROCEDURE — 86803 HEPATITIS C AB TEST: CPT

## 2023-05-12 PROCEDURE — 80061 LIPID PANEL: CPT

## 2023-05-12 PROCEDURE — 99386 PREV VISIT NEW AGE 40-64: CPT

## 2023-05-12 PROCEDURE — 36415 COLL VENOUS BLD VENIPUNCTURE: CPT

## 2023-05-12 PROCEDURE — 87389 HIV-1 AG W/HIV-1&-2 AB AG IA: CPT

## 2023-05-12 PROCEDURE — 84443 ASSAY THYROID STIM HORMONE: CPT

## 2023-05-12 PROCEDURE — 99214 OFFICE O/P EST MOD 30 MIN: CPT | Mod: 25

## 2023-05-12 PROCEDURE — 80053 COMPREHEN METABOLIC PANEL: CPT

## 2023-05-12 PROCEDURE — 83036 HEMOGLOBIN GLYCOSYLATED A1C: CPT

## 2023-05-12 PROCEDURE — 87624 HPV HI-RISK TYP POOLED RSLT: CPT

## 2023-05-12 PROCEDURE — G0145 SCR C/V CYTO,THINLAYER,RESCR: HCPCS

## 2023-05-12 RX ORDER — VALACYCLOVIR HYDROCHLORIDE 1 G/1
TABLET, FILM COATED ORAL
Qty: 30 TABLET | Refills: 1 | Status: SHIPPED | OUTPATIENT
Start: 2023-05-12 | End: 2024-06-13

## 2023-05-12 ASSESSMENT — ENCOUNTER SYMPTOMS
DIARRHEA: 0
CONSTIPATION: 0
EYE PAIN: 0
WEAKNESS: 0
DYSURIA: 1
NAUSEA: 0
HEMATOCHEZIA: 0
PALPITATIONS: 0
ARTHRALGIAS: 1
SHORTNESS OF BREATH: 0
BREAST MASS: 0
MYALGIAS: 1
ABDOMINAL PAIN: 0
FREQUENCY: 1
FEVER: 0
CHILLS: 0
HEMATURIA: 0
HEARTBURN: 0
JOINT SWELLING: 1
DIZZINESS: 0
COUGH: 0
PARESTHESIAS: 0
SORE THROAT: 0
HEADACHES: 0
NERVOUS/ANXIOUS: 0

## 2023-05-12 NOTE — ASSESSMENT & PLAN NOTE
Annual exam. Preventative health recommendations updated today. PAP done, colonoscopy ordered. UTD on mammogram. Visit today significant for new diagnosis of Type 2 diabetes, as documented elsewhere. BMI is 39. Poor dietary habits per her report. Patient has intake appointment with comprehensive weight management scheduled. Vitamin D, calcium and weight bearing discussed as it pertains to bone health. Follow up in one year for preventative visit.

## 2023-05-12 NOTE — PROGRESS NOTES
SUBJECTIVE:   CC: Mariluz is an 47 year old who presents for preventive health visit.  In addition to preventative medicine, she reports approximately 4 days of urinary symptoms.  Is having pain at the conclusion of urinary stream.  Denies any changes to her urine's color or odor.  No blood in the urine. No history of recent or recurrent UTI      5/12/2023     2:20 PM   Additional Questions   Roomed by ac   Accompanied by self         5/12/2023     2:20 PM   Patient Reported Additional Medications   Patient reports taking the following new medications n/a   Patient has been advised of split billing requirements and indicates understanding: Yes     Healthy Habits:     Getting at least 3 servings of Calcium per day:  Yes    Bi-annual eye exam:  Yes    Dental care twice a year:  Yes    Sleep apnea or symptoms of sleep apnea:  None    Diet:  Regular (no restrictions)    Frequency of exercise:  1 day/week    Duration of exercise:  15-30 minutes    Taking medications regularly:  Yes    Medication side effects:  Not applicable    PHQ-2 Total Score: 0    Additional concerns today:  No    Struggles with diet at home. Does get a good amount of vegetables but reports high fats. Infrequent processed foods. High salt.    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 Also uses the [employer] machine.     Today's PHQ-2 Score:       5/11/2023     5:06 PM   PHQ-2 ( 1999 [employer])   Q1: Little interest or pleasure in doing things 0   Q2: Feeling down, depressed or hopeless 0   PHQ-2 Score 0   Q1: Little interest or pleasure in doing things Not at all   Q2: Feeling down, depressed or hopeless Not at all   PHQ-2 Score 0     Have you ever done Advance Care Planning? (For example, a Health Directive, POLST, or a discussion with a medical provider or your loved ones about your wishes): No, advance care planning information given to patient to review.  Patient plans to discuss their wishes with loved  ones or provider.      Social History     Tobacco Use     Smoking status: Former     Smokeless tobacco: Never   Vaping Use     Vaping status: Never Used   Substance Use Topics     Alcohol use: Yes     Comment: Alcoholic Drinks/day: occasionally         5/11/2023     5:05 PM   Alcohol Use   Prescreen: >3 drinks/day or >7 drinks/week? No     Reviewed orders with patient.  Reviewed health maintenance and updated orders accordingly - Yes  Lab work is in process  Labs reviewed in EPIC  BP Readings from Last 3 Encounters:   05/12/23 (!) 161/102   08/30/21 (!) 248/114    Wt Readings from Last 3 Encounters:   05/12/23 105.3 kg (232 lb 1.6 oz)   08/29/21 108.9 kg (240 lb)   04/24/19 105.8 kg (233 lb 3.2 oz)                  Patient Active Problem List   Diagnosis     Prediabetes     Herpes Simplex Acute Gingivostomatitis     Joint Pain Fingers     Polyarthritis Of The Hand     Arthralgias In Multiple Sites     Generalized Osteoarthritis Of The Hand     High risk medication use     History of gestational diabetes     Gastroesophageal reflux disease without esophagitis     Severe obesity (BMI 35.0-39.9) with comorbidity (H)     Avitaminosis D     Hypermobility arthralgia     Routine general medical examination at a health care facility     Dysuria     Type 2 diabetes mellitus without complication, without long-term current use of insulin (H)     Elevated blood pressure reading in office without diagnosis of hypertension     Past Surgical History:   Procedure Laterality Date     LASER ABLATION       OVARIAN CYST REMOVAL         Social History     Tobacco Use     Smoking status: Former     Smokeless tobacco: Never   Vaping Use     Vaping status: Never Used   Substance Use Topics     Alcohol use: Yes     Comment: Alcoholic Drinks/day: occasionally     Family History   Problem Relation Age of Onset     Diabetes Mother      Osteoporosis Mother      Sleep Apnea Mother      Coronary Artery Disease Mother      Hyperlipidemia Father       Hypertension Father      Obesity Brother      Heart Disease Paternal Grandmother      No Known Problems Daughter      No Known Problems Son      Breast Cancer No family hx of      Colon Cancer No family hx of      Prostate Cancer No family hx of      Ovarian Cancer No family hx of      Skin Cancer No family hx of          Current Outpatient Medications   Medication Sig Dispense Refill     metFORMIN (GLUCOPHAGE) 500 MG tablet Take 2 tablets (1,000 mg) by mouth 2 times daily (with meals) for 90 days 360 tablet 0     valACYclovir (VALTREX) 1000 mg tablet [VALACYCLOVIR (VALTREX) 1000 MG TABLET] TAKE 2 TABLETS BY MOUTH 1 TIME. REPEAT WITH 2 TABLETS IN 12 HOURS 30 tablet 1     No Known Allergies  Recent Labs   Lab Test 23  1516 19  1223 18  1059 16  0843   A1C 10.8*  --   --  6.0   LDL  --   --   --  137*   HDL  --   --   --  50   TRIG  --   --   --  124   ALT  --  18 18  --    CR  --  0.73 0.70  --    GFRESTIMATED  --  >60 >60  --    GFRESTBLACK  --  >60 >60  --         Breast Cancer Screenin/11/2023     5:07 PM   Breast CA Risk Assessment (FHS-7)   Do you have a family history of breast, colon, or ovarian cancer? No / Unknown     Mammogram Screening: Recommended annual mammography  Pertinent mammograms are reviewed under the imaging tab.    History of abnormal Pap smear: NO - age 30-65 PAP every 5 years with negative HPV co-testing recommended      2016    12:00 AM   PAP / HPV   HPV_EXT - HISTORICAL See Scanned Report       Reviewed and updated as needed this visit by clinical staff   Tobacco  Allergies  Meds  Problems  Med Hx  Surg Hx  Fam Hx          Reviewed and updated as needed this visit by Provider   Tobacco  Allergies  Meds  Problems  Med Hx  Surg Hx  Fam Hx         Review of Systems   Constitutional: Negative for chills and fever.   HENT: Negative for congestion, ear pain, hearing loss and sore throat.    Eyes: Negative for pain and visual disturbance.  "  Respiratory: Negative for cough and shortness of breath.    Cardiovascular: Negative for chest pain, palpitations and peripheral edema.   Gastrointestinal: Negative for abdominal pain, constipation, diarrhea, heartburn, hematochezia and nausea.   Breasts:  Negative for tenderness, breast mass and discharge.   Genitourinary: Positive for dysuria, frequency and urgency. Negative for genital sores, hematuria, pelvic pain, vaginal bleeding and vaginal discharge.   Musculoskeletal: Positive for arthralgias, joint swelling and myalgias.   Skin: Negative for rash.   Neurological: Negative for dizziness, weakness, headaches and paresthesias.   Psychiatric/Behavioral: Negative for mood changes. The patient is not nervous/anxious.       OBJECTIVE:   BP (!) 161/102 (BP Location: Left arm, Patient Position: Sitting, Cuff Size: Adult Large)   Pulse 71   Resp 16   Ht 1.626 m (5' 4\")   Wt 105.3 kg (232 lb 1.6 oz)   SpO2 97%   BMI 39.84 kg/m       Physical Exam  GENERAL: healthy, alert and no distress  EYES: Eyes grossly normal to inspection, PERRL and conjunctivae and sclerae normal  HENT: ear canals and TM's normal, nose and mouth without ulcers or lesions  NECK: no adenopathy, no asymmetry, masses, or scars and thyroid normal to palpation  RESP: lungs clear to auscultation - no rales, rhonchi or wheezes  BREAST: deferred; mammogram 2 weeks ago  CV: regular rate and rhythm, normal S1 S2, no S3 or S4, no murmur, click or rub, no peripheral edema and peripheral pulses strong  ABDOMEN: soft, nontender, no hepatosplenomegaly, no masses and bowel sounds normal   (female): normal female external genitalia, normal urethral meatus, vaginal mucosa pink, moist, well rugated, and normal cervix/adnexa/uterus without masses or discharge  MS: no gross musculoskeletal defects noted, no edema. Heberden's nodes present on multiple digits of bilateral hands.  SKIN: no suspicious lesions or rashes  NEURO: Normal strength and tone, " mentation intact and speech normal  PSYCH: mentation appears normal, affect normal/bright    Diagnostic Test Results:  Labs reviewed in Epic  Results for orders placed or performed in visit on 05/12/23 (from the past 24 hour(s))   UA Macroscopic with reflex to Microscopic and Culture    Specimen: Urine, Midstream   Result Value Ref Range    Color Urine Yellow Colorless, Straw, Light Yellow, Yellow    Appearance Urine Clear Clear    Glucose Urine 500 (A) Negative mg/dL    Bilirubin Urine Negative Negative    Ketones Urine Negative Negative mg/dL    Specific Gravity Urine 1.015 1.005 - 1.030    Blood Urine Negative Negative    pH Urine 7.0 5.0 - 8.0    Protein Albumin Urine Negative Negative mg/dL    Urobilinogen Urine 0.2 0.2, 1.0 E.U./dL    Nitrite Urine Negative Negative    Leukocyte Esterase Urine Negative Negative    Narrative    Microscopic not indicated   CBC with platelets   Result Value Ref Range    WBC Count 7.9 4.0 - 11.0 10e3/uL    RBC Count 5.04 3.80 - 5.20 10e6/uL    Hemoglobin 15.2 11.7 - 15.7 g/dL    Hematocrit 43.8 35.0 - 47.0 %    MCV 87 78 - 100 fL    MCH 30.2 26.5 - 33.0 pg    MCHC 34.7 31.5 - 36.5 g/dL    RDW 12.9 10.0 - 15.0 %    Platelet Count 256 150 - 450 10e3/uL   Hemoglobin A1c   Result Value Ref Range    Hemoglobin A1C 10.8 (H) 0.0 - 5.6 %       ASSESSMENT/PLAN:     Problem List Items Addressed This Visit        Nervous and Auditory    Arthralgias In Multiple Sites     Thought to be combination of severe degenerative arthritis in addition to hypermobile arthritis and was previously prescribed hydroxychloroquine.  Was previously managed by rheumatology, but has not been to the clinic in over 3 years, therefore they are requiring a new referral.  This was placed today.         Relevant Orders    Adult Rheumatology  Referral       Digestive    Herpes Simplex Acute Gingivostomatitis     Uses infrequently. A couple of times a year. Refill provided today.         Relevant Medications     valACYclovir (VALTREX) 1000 mg tablet    Gastroesophageal reflux disease without esophagitis     Occasionally an issue. Will take TUMs and omeprazole OTC. This will help symptoms.         Severe obesity (BMI 35.0-39.9) with comorbidity (H)     Patient has done well with medical weight management in the past, and is requesting comprehensive weight management today.  She was assisted in scheduling an appointment with a provider today.         Relevant Medications    metFORMIN (GLUCOPHAGE) 500 MG tablet    Avitaminosis D     Updated labs today.          Relevant Orders    Vitamin D Deficiency       Endocrine    Prediabetes     Updated hemoglobin A1C today.         Type 2 diabetes mellitus without complication, without long-term current use of insulin (H)     A1c today showed elevation to 10.86, indicating progression from prediabetes to type 2 diabetes.  Unable to reach patient via the phone to discuss results, but [employer] message was sent.  Will initiate metformin and discussed potential side effects. She would be an excellent candidate for semaglutide with this; has a weight management appointment scheduled as well. Patient has scheduled nurse check for blood pressure, as that she has not had elevations in clinic previously, but will likely need the addition of an ACE or ARB.  Additionally, cholesterol panel is pending; will prescribe appropriate dose of statin medication at that time.  Order was placed for diabetes education.  She will need to follow-up with her primary care provider in 3 months for updated labs and comprehensive diabetic visit at that time.         Relevant Medications    metFORMIN (GLUCOPHAGE) 500 MG tablet    Other Relevant Orders    AMB Adult Diabetes Educator Referral    Hemoglobin A1c       Musculoskeletal and Integumentary    Polyarthritis Of The Hand       Urinary    Dysuria     UA was negative for infection today, but significant for a glucose of 500, likely contributing to her symptoms.  New diagnosis of diabetes plan as documented elsewhere.         Relevant Orders    UA Macroscopic with reflex to Microscopic and Culture (Completed)       Other    Routine general medical examination at a health care facility - Primary     Annual exam. Preventative health recommendations updated today. PAP done, colonoscopy ordered. UTD on mammogram. Visit today significant for new diagnosis of Type 2 diabetes, as documented elsewhere. BMI is 39. Poor dietary habits per her report. Patient has intake appointment with comprehensive weight management scheduled. Vitamin D, calcium and weight bearing discussed as it pertains to bone health. Follow up in one year for preventative visit.         Relevant Orders    Comprehensive metabolic panel (BMP + Alb, Alk Phos, ALT, AST, Total. Bili, TP)    CBC with platelets (Completed)    Elevated blood pressure reading in office without diagnosis of hypertension     Likely accurate, but no supplemental data available to support diagnosis. Patient is anxious. She will have blood pressure check next week in clinic and will likely need initiation of lisinopril at that time.         Other Visit Diagnoses     H/O cold sores        Relevant Medications    valACYclovir (VALTREX) 1000 mg tablet    Screen for colon cancer        Relevant Orders    Colonoscopy Screening  Referral    Screening for HIV (human immunodeficiency virus)        Relevant Orders    HIV Antigen Antibody Combo    Need for hepatitis C screening test        Relevant Orders    Hepatitis C Screen Reflex to HCV RNA Quant and Genotype    Cervical cancer screening        Relevant Orders    Pap Screen with HPV - recommended age 30 - 65 years    Screening for diabetes mellitus        Relevant Orders    Hemoglobin A1c (Completed)    Screening for thyroid disorder        Relevant Orders    TSH with free T4 reflex    Screening for lipid disorders        Relevant Orders    Lipid panel reflex to direct LDL Non-fasting     "    COUNSELING:  Reviewed preventive health counseling, as reflected in patient instructions       Regular exercise       Healthy diet/nutrition       Osteoporosis prevention/bone health       Colorectal Cancer Screening       Advance Care Planning      BMI:   Estimated body mass index is 39.84 kg/m  as calculated from the following:    Height as of this encounter: 1.626 m (5' 4\").    Weight as of this encounter: 105.3 kg (232 lb 1.6 oz).   Weight management plan: Patient referred to endocrine and/or weight management specialty Discussed healthy diet and exercise guidelines    She reports that she has quit smoking. She has never used smokeless tobacco.    SHERMAN Gonzalez CNP  [facility]  "

## 2023-05-12 NOTE — ASSESSMENT & PLAN NOTE
Thought to be combination of severe degenerative arthritis in addition to hypermobile arthritis and was previously prescribed hydroxychloroquine.  Was previously managed by rheumatology, but has not been to the clinic in over 3 years, therefore they are requiring a new referral.  This was placed today.

## 2023-05-12 NOTE — ASSESSMENT & PLAN NOTE
Patient has done well with medical weight management in the past, and is requesting comprehensive weight management today.  She was assisted in scheduling an appointment with a provider today.

## 2023-05-12 NOTE — ASSESSMENT & PLAN NOTE
UA was negative for infection today, but significant for a glucose of 500, likely contributing to her symptoms. New diagnosis of diabetes plan as documented elsewhere.

## 2023-05-12 NOTE — ASSESSMENT & PLAN NOTE
Likely accurate, but no supplemental data available to support diagnosis. Patient is anxious. She will have blood pressure check next week in clinic and will likely need initiation of lisinopril at that time.

## 2023-05-13 LAB
DEPRECATED CALCIDIOL+CALCIFEROL SERPL-MC: 35 UG/L (ref 20–75)
HCV AB SERPL QL IA: NONREACTIVE
HIV 1+2 AB+HIV1 P24 AG SERPL QL IA: NONREACTIVE

## 2023-05-15 DIAGNOSIS — E11.9 TYPE 2 DIABETES MELLITUS WITHOUT COMPLICATION, WITHOUT LONG-TERM CURRENT USE OF INSULIN (H): Primary | ICD-10-CM

## 2023-05-15 RX ORDER — SIMVASTATIN 40 MG
40 TABLET ORAL AT BEDTIME
Qty: 90 TABLET | Refills: 3 | Status: SHIPPED | OUTPATIENT
Start: 2023-05-15 | End: 2024-06-13

## 2023-05-15 NOTE — TELEPHONE ENCOUNTER
Dm supplies prepped below, we are happy to do an nurse only to teach her if she does not understand how to use meter

## 2023-05-17 LAB
BKR LAB AP GYN ADEQUACY: NORMAL
BKR LAB AP GYN INTERPRETATION: NORMAL
BKR LAB AP HPV REFLEX: NORMAL
BKR LAB AP PREVIOUS ABNORMAL: NORMAL
PATH REPORT.COMMENTS IMP SPEC: NORMAL
PATH REPORT.COMMENTS IMP SPEC: NORMAL
PATH REPORT.RELEVANT HX SPEC: NORMAL

## 2023-05-18 ENCOUNTER — ALLIED HEALTH/NURSE VISIT (OUTPATIENT)
Dept: FAMILY MEDICINE | Facility: CLINIC | Age: 48
End: 2023-05-18
Payer: COMMERCIAL

## 2023-05-18 VITALS
HEART RATE: 74 BPM | DIASTOLIC BLOOD PRESSURE: 86 MMHG | RESPIRATION RATE: 16 BRPM | SYSTOLIC BLOOD PRESSURE: 146 MMHG | OXYGEN SATURATION: 99 %

## 2023-05-18 DIAGNOSIS — R03.0 ELEVATED BLOOD PRESSURE READING IN OFFICE WITHOUT DIAGNOSIS OF HYPERTENSION: Primary | ICD-10-CM

## 2023-05-18 LAB
HUMAN PAPILLOMA VIRUS 16 DNA: NEGATIVE
HUMAN PAPILLOMA VIRUS 18 DNA: NEGATIVE
HUMAN PAPILLOMA VIRUS FINAL DIAGNOSIS: NORMAL
HUMAN PAPILLOMA VIRUS OTHER HR: NEGATIVE

## 2023-05-18 PROCEDURE — 99207 PR NO CHARGE NURSE ONLY: CPT

## 2023-05-18 NOTE — PROGRESS NOTES
I met with Mariluz Duron at the request of Mónica Vera CNP to recheck her blood pressure.  Blood pressure medications on the med list were reviewed with patient.    Patient has taken all medications as per usual regimen: Yes  Patient reports tolerating them without any issues or concerns: Yes    Vitals:    05/18/23 1608   BP: (!) 146/86   BP Location: Left arm   Patient Position: Sitting   Cuff Size: Adult Large   Pulse: 74   Resp: 16   SpO2: 99%       Blood pressure was taken, previous encounter was reviewed, recorded blood pressure below 140/90.  Patient was discharged and the note will be sent to the provider for final review.

## 2023-05-22 NOTE — PROGRESS NOTES
Remains elevated above goal. Will monitor value at next office visit and likely initiate lisinopril at that time.

## 2023-05-24 ASSESSMENT — SLEEP AND FATIGUE QUESTIONNAIRES
HOW LIKELY ARE YOU TO NOD OFF OR FALL ASLEEP WHILE SITTING AND READING: HIGH CHANCE OF DOZING
HOW LIKELY ARE YOU TO NOD OFF OR FALL ASLEEP IN A CAR, WHILE STOPPED FOR A FEW MINUTES IN TRAFFIC: WOULD NEVER DOZE
HOW LIKELY ARE YOU TO NOD OFF OR FALL ASLEEP WHILE SITTING QUIETLY AFTER LUNCH WITHOUT ALCOHOL: MODERATE CHANCE OF DOZING
HOW LIKELY ARE YOU TO NOD OFF OR FALL ASLEEP WHILE SITTING INACTIVE IN A PUBLIC PLACE: MODERATE CHANCE OF DOZING
HOW LIKELY ARE YOU TO NOD OFF OR FALL ASLEEP WHILE LYING DOWN TO REST IN THE AFTERNOON WHEN CIRCUMSTANCES PERMIT: HIGH CHANCE OF DOZING
HOW LIKELY ARE YOU TO NOD OFF OR FALL ASLEEP WHILE WATCHING TV: HIGH CHANCE OF DOZING
HOW LIKELY ARE YOU TO NOD OFF OR FALL ASLEEP WHILE SITTING AND TALKING TO SOMEONE: SLIGHT CHANCE OF DOZING
HOW LIKELY ARE YOU TO NOD OFF OR FALL ASLEEP WHEN YOU ARE A PASSENGER IN A CAR FOR AN HOUR WITHOUT A BREAK: SLIGHT CHANCE OF DOZING

## 2023-05-26 ENCOUNTER — OFFICE VISIT (OUTPATIENT)
Dept: FAMILY MEDICINE | Facility: CLINIC | Age: 48
End: 2023-05-26
Payer: COMMERCIAL

## 2023-05-26 VITALS
RESPIRATION RATE: 16 BRPM | BODY MASS INDEX: 38.84 KG/M2 | SYSTOLIC BLOOD PRESSURE: 135 MMHG | OXYGEN SATURATION: 97 % | HEART RATE: 64 BPM | WEIGHT: 227.5 LBS | HEIGHT: 64 IN | TEMPERATURE: 98.7 F | DIASTOLIC BLOOD PRESSURE: 82 MMHG

## 2023-05-26 DIAGNOSIS — K21.9 GASTROESOPHAGEAL REFLUX DISEASE WITHOUT ESOPHAGITIS: ICD-10-CM

## 2023-05-26 DIAGNOSIS — E11.9 TYPE 2 DIABETES MELLITUS WITHOUT COMPLICATION, WITHOUT LONG-TERM CURRENT USE OF INSULIN (H): ICD-10-CM

## 2023-05-26 DIAGNOSIS — R03.0 ELEVATED BLOOD PRESSURE READING IN OFFICE WITHOUT DIAGNOSIS OF HYPERTENSION: ICD-10-CM

## 2023-05-26 DIAGNOSIS — Z86.32 HISTORY OF GESTATIONAL DIABETES: ICD-10-CM

## 2023-05-26 DIAGNOSIS — R73.03 PREDIABETES: ICD-10-CM

## 2023-05-26 DIAGNOSIS — E66.812 CLASS 2 SEVERE OBESITY DUE TO EXCESS CALORIES WITH SERIOUS COMORBIDITY AND BODY MASS INDEX (BMI) OF 39.0 TO 39.9 IN ADULT (H): Primary | ICD-10-CM

## 2023-05-26 DIAGNOSIS — E66.01 CLASS 2 SEVERE OBESITY DUE TO EXCESS CALORIES WITH SERIOUS COMORBIDITY AND BODY MASS INDEX (BMI) OF 39.0 TO 39.9 IN ADULT (H): Primary | ICD-10-CM

## 2023-05-26 PROCEDURE — 99215 OFFICE O/P EST HI 40 MIN: CPT | Performed by: FAMILY MEDICINE

## 2023-05-26 RX ORDER — ASPIRIN 81 MG/1
81 TABLET ORAL DAILY
COMMUNITY
Start: 2023-05-26

## 2023-05-26 NOTE — ASSESSMENT & PLAN NOTE
47 year old year old female in clinic today to discuss treatment of the following conditions through diet and lifestyle modification and weight loss:  1. Class 2 severe obesity due to excess calories with serious comorbidity and body mass index (BMI) of 39.0 to 39.9 in adult (H)    2. Type 2 diabetes mellitus without complication, without long-term current use of insulin (H)    3. Elevated blood pressure reading in office without diagnosis of hypertension    4. Gastroesophageal reflux disease without esophagitis    5. History of gestational diabetes    6. Prediabetes      Restart: 47-year-old woman diagnosed with diabetes earlier this month presenting for discussion of medical weight loss.  She participated in a program in 2016 in which she utilized phentermine.  She found that she did lose weight but that the medicine had a decreasing effect over time.  Ultimately she went off the medication and regained her weight.  Since her diagnosis of diabetes she has been working to reduce her overall carbohydrate consumption.  Her blood sugars have come down accordingly and she can say that she feels a bit better.  We do lengthy discussion regarding metabolic syndrome, condition for which she meets full criteria.  We discussed this is a nutritional framework.  I recommended a high-protein, high vegetable nutrition plan.  She will meet with the bariatric dietitian.  She has tolerated metformin.  We will add a GLP-1 receptor agonist.  I believe that her insurance will cover Wegovy for weight management.  If not covered we will try for Mounjaro.  Given the pharmaceutical burden we will decrease her metformin dose slightly to 500 mg twice daily.  We will consider shifting her to extended release metformin at a future time.  She has already started a statin.  She will start a low-dose of aspirin.  I believe that she will do well with a GLP-1 receptor agonist.  No personal/family history of thyroid cancer.  No personal history of  pancreatitis.   - Start GLP-1 receptor agonist.  We will use Saxenda as a means by which to titrate to the maintenance doses of Wegovy.  - Metformin 500 mg twice daily  - Follow-up in approximately 8 weeks recommended.

## 2023-05-26 NOTE — PROGRESS NOTES
"    New Medical Weight Management Consult    PATIENT:  Mariluz Duron  MRN:         [id number]  :         1975  BUTCH:         2023    Dear Mónica Vera DNP,    I had the pleasure of seeing your patient, Mariluz Duron. Full intake/assessment was done to determine barriers to weight loss success and develop a treatment plan. Mariluz Duron is a 47 year old female interested in treatment of medical problems associated with excess weight. She has a height of 5' 3.5\", a weight of 227 lbs 8 oz, and the calculated Body mass index is 39.67 kg/m .    ASSESSMENT/PLAN:  Class 2 severe obesity due to excess calories with serious comorbidity and body mass index (BMI) of 39.0 to 39.9 in adult (H)  47 year old year old female in clinic today to discuss treatment of the following conditions through diet and lifestyle modification and weight loss:  1. Class 2 severe obesity due to excess calories with serious comorbidity and body mass index (BMI) of 39.0 to 39.9 in adult (H)    2. Type 2 diabetes mellitus without complication, without long-term current use of insulin (H)    3. Elevated blood pressure reading in office without diagnosis of hypertension    4. Gastroesophageal reflux disease without esophagitis    5. History of gestational diabetes    6. Prediabetes      Restart: 47-year-old woman diagnosed with diabetes earlier this month presenting for discussion of medical weight loss.  She participated in a program in 2016 in which she utilized phentermine.  She found that she did lose weight but that the medicine had a decreasing effect over time.  Ultimately she went off the medication and regained her weight.  Since her diagnosis of diabetes she has been working to reduce her overall carbohydrate consumption.  Her blood sugars have come down accordingly and she can say that she feels a bit better.  We do lengthy discussion regarding metabolic syndrome, condition for which she meets full criteria.  We discussed " "this is a nutritional framework.  I recommended a high-protein, high vegetable nutrition plan.  She will meet with the bariatric dietitian.  She has tolerated metformin.  We will add a GLP-1 receptor agonist.  I believe that her insurance will cover Wegovy for weight management.  If not covered we will try for Mounjaro.  Given the pharmaceutical burden we will decrease her metformin dose slightly to 500 mg twice daily.  We will consider shifting her to extended release metformin at a future time.  She has already started a statin.  She will start a low-dose of aspirin.  I believe that she will do well with a GLP-1 receptor agonist.  No personal/family history of thyroid cancer.  No personal history of pancreatitis.   - Start GLP-1 receptor agonist.  We will use Saxenda as a means by which to titrate to the maintenance doses of Wegovy.  - Metformin 500 mg twice daily  - Follow-up in approximately 8 weeks recommended.    FOLLOW-UP:   8 weeks.    SUBJECTIVE:     She has the following co-morbidities:        5/24/2023     9:09 PM   --   I have the following health issues associated with obesity Type II Diabetes    High Blood Pressure    High Cholesterol    Osteoarthritis (joint disease)   I have the following symptoms associated with obesity Knee Pain    Depression    Lower Extremity Swelling    Back Pain    Fatigue    Hip Pain     Narrative History:    - recent diagnosis of diabetes mellitus. Family history of diabetes mellitus.    - she has made nutrition changes over the past couple of weeks. Less carbs.  She thinks that she feels better.    - she has been checking her BG levels.  This morning was 189.  Her recent low was 150mg/dL.          View : No data to display.                  5/24/2023     9:09 PM   Referring Provider   Please name the provider who referred you to Medical Weight Management  If you do not know, please answer \"I Don't Know\" Mónica RIVERA?         5/24/2023     9:09 PM   Weight History   How concerned " are you about your weight? Very Concerned   I became overweight As a Child   The following factors have contributed to my weight gain Mental Health Issues    Change in Schedule    After Quitting Smoking    Eating Wrong Types of Food    Eating Too Much    Lack of Exercise    Genetic (Runs in the Family)    Stress   I have tried the following methods to lose weight Watching Portions or Calories    Exercise    Atkins-type Diet (Low Carb/High Protein)    Nutrisystem    Slim Fast or Other Liquid Diets    Medications    Meal Replacements    Fasting   My lowest weight since age 18 was 120   My highest weight since age 18 was 280   The most weight I have ever lost was (lbs) 100   I have the following family history of obesity/being overweight My mother is overweight    My father is overweight    One or more of my siblings are overweight    Many of my relatives are overweight   How has your weight changed over the last year? Gained   How many pounds? About 20+ lbs.         5/24/2023     9:09 PM   Diet Recall Review with Patient   If you do eat breakfast, what types of food do you eat? Eggs, deng or sausage, hashbrowns  or a sausage, egg and cheese on an english muffin   If you do eat lunch, what types of food do you typically eat? Leftover dinner from the night before   If you do eat supper, what types of food do you typically eat? Beef, porkchops, chicken, sides like potatoes, corn, beans, rice , tortillas, pasta   If you do snack, what types of food do you typically eat? Chocolate,  chips, fruits   How many glasses of juice do you drink in a typical day? 0   How many of glasses of milk do you drink in a typical day? 0   How many 8oz glasses of sugar containing drinks such as Efrain-Aid/sweet tea do you drink in a day? She has sweetened ice tear weekly.    How many cans/bottles of sugar pop/soda/tea/sports drinks do you drink in a day? 1   How many cans/bottles of diet pop/soda/tea or sports drink do you drink in a day? 3    How often do you have a drink of alcohol? 2-4 Times a Month   If you do drink, how many drinks might you have in a day? 3-4 - weekends.           5/24/2023     9:09 PM   Eating Habits   Generally, my meals include foods like these bread, pasta, rice, potatoes, corn, crackers, sweet dessert, pop, or juice Almost Everyday   Generally, my meals include foods like these fried meats, brats, burgers, french fries, pizza, cheese, chips, or ice cream A Few Times a Week   Eat fast food (like McDonalds, Burger Marcio, Taco Bell) Less Than Weekly   Eat at a buffet or sit-down restaurant Less Than Weekly   Eat most of my meals in front of the TV or computer Almost Everyday   Often skip meals, eat at random times, have no regular eating times A Few Times a Week   Rarely sit down for a meal but snack or graze throughout A Few Times a Week   Eat extra snacks between meals Almost Everyday   Eat most of my food at the end of the day Almost Everyday   Eat in the middle of the night or wake up at night to eat Once a Week   Eat extra snacks to prevent or correct low blood sugar Never   Eat to prevent acid reflux or stomach pain Less Than Weekly   Worry about not having enough food to eat Never   I eat when I am depressed A Few Times a Week   I eat when I am stressed A Few Times a Week   I eat when I am bored Once a Week   I eat when I am anxious Once a Week   I eat when I am happy or as a reward Once a Week   I feel hungry all the time even if I just have eaten Everyday   Feeling full is important to me Everyday   I finish all the food on my plate even if I am already full Everyday   I can't resist eating delicious food or walk past the good food/smell Once a Week   I eat/snack without noticing that I am eating A Few Times a Week   I eat when I am preparing the meal Everyday   I eat more than usual when I see others eating A Few Times a Week   I have trouble not eating sweets, ice cream, cookies, or chips if they are around the house A  Few Times a Week   I think about food all day Once a Week   What foods, if any, do you crave? Chips/Crackers   Please list any other foods you crave? Chocolate         5/24/2023     9:09 PM   Amount of Food   I feel out of control when eating Weekly   I eat a large amount of food, like a loaf of bread, a box of cookies, a pint/quart of ice cream, all at once Monthly   I eat a large amount of food even when I am not hungry Weekly   I eat rapidly Almost Everyday   I eat alone because I feel embarrassed and do not want others to see how much I have eaten Weekly   I eat until I am uncomfortably full Weekly   I feel bad, disgusted, or guilty after I overeat Almost Everyday         5/24/2023     9:09 PM   Activity/Exercise History   How much of a typical 12 hour day do you spend sitting? Less Than Half the Day   How much of a typical 12 hour day do you spend lying down? Less Than Half the Day   How much of a typical day do you spend walking/standing? Most of the Day   How many hours (not including work) do you spend on the TV/Video Games/Computer/Tablet/Phone? 4-5 Hours   How many times a week are you active for the purpose of exercise? Once a Week   What keeps you from being more active? Pain    Lack of Time    Too tired    Worried People Will Look At Me   How many total minutes do you spend doing some activity for the purpose of exercising when you exercise? 15-30 Minutes     PAST MEDICAL HISTORY:  Past Medical History:   Diagnosis Date     Arthritis 5+ years ago     Diabetes (H) 5-12-23     Hypertension 5-11-23 5/24/2023     9:09 PM   Work/Social History Reviewed With Patient   My employment status is Full-Time   My job is [employer]   How much of your job is spent on the computer or phone? Less Than 50%   How many hours do you spend commuting to work daily? 40 minutes   What is your marital status? /In a Relationship   If in a relationship, is your significant other overweight? No   If  you have children, are they overweight? No   Who do you live with? My , son (22), daughter (12)   Who does the food shopping? I do         5/24/2023     9:09 PM   Mental Health History Reviewed With Patient   Have you ever been physically or sexually abused? No   How often in the past 2 weeks have you felt little interest or pleasure in doing things? For Several Days   Over the past 2 weeks how often have you felt down, depressed, or hopeless? For Several Days         5/24/2023     9:09 PM   Sleep History Reviewed With Patient   How many hours do you sleep at night? 6     MEDICATIONS:   Current Outpatient Medications   Medication Sig Dispense Refill     aspirin 81 MG EC tablet Take 1 tablet (81 mg) by mouth daily       blood glucose (NO BRAND SPECIFIED) lancets standard Use to test blood sugar three times daily or as directed. 300 each 3     blood glucose (NO BRAND SPECIFIED) test strip Use to test blood sugar three times daily or as directed. 300 strip 3     blood glucose monitoring (NO BRAND SPECIFIED) meter device kit Use to test blood sugar 2 times daily or as directed. 1 kit 0     insulin pen needle (32G X 4 MM) 32G X 4 MM miscellaneous Use 1 pen needles daily or as directed. 50 each 1     liraglutide - Weight Management (SAXENDA) 18 MG/3ML pen Inject 0.6 mg daily.  Increase dose every 7 days by 0.6 mg as tolerated.  Maximum dose 3.0 mg/day. 9 mL 1     metFORMIN (GLUCOPHAGE) 500 MG tablet Take 1 tablet (500 mg) by mouth 2 times daily (with meals) 360 tablet 0     simvastatin (ZOCOR) 40 MG tablet Take 1 tablet (40 mg) by mouth At Bedtime 90 tablet 3     valACYclovir (VALTREX) 1000 mg tablet [VALACYCLOVIR (VALTREX) 1000 MG TABLET] TAKE 2 TABLETS BY MOUTH 1 TIME. REPEAT WITH 2 TABLETS IN 12 HOURS 30 tablet 1     ALLERGIES:   No Known Allergies  PHYSICAL EXAM:  Physical Exam  Nursing note reviewed.   Constitutional:       General: She is not in acute distress.     Appearance: Normal appearance. She is not  ill-appearing.   HENT:      Head: Normocephalic and atraumatic.   Eyes:      Extraocular Movements: Extraocular movements intact.      Conjunctiva/sclera: Conjunctivae normal.   Pulmonary:      Effort: Pulmonary effort is normal.   Neurological:      Mental Status: She is alert and oriented to person, place, and time.   Psychiatric:         Attention and Perception: Attention normal.         Mood and Affect: Mood normal.         Speech: Speech normal.         Thought Content: Thought content normal.       55 minutes spent on the date of the encounter doing chart review, history and exam, documentation and further activities per the note    This note has been dictated using voice recognition software. Any grammatical or context distortions are unintentional and inherent to the software    Sincerely,    Len Melchor MD  Diplomate - American Board of Obesity Medicine  Diplomate - American Board of Family Medicine  [facility] - Comprehensive Weight [facility]

## 2023-06-09 ENCOUNTER — VIRTUAL VISIT (OUTPATIENT)
Dept: EDUCATION SERVICES | Facility: CLINIC | Age: 48
End: 2023-06-09
Payer: COMMERCIAL

## 2023-06-09 DIAGNOSIS — E11.9 TYPE 2 DIABETES MELLITUS WITHOUT COMPLICATION, WITHOUT LONG-TERM CURRENT USE OF INSULIN (H): ICD-10-CM

## 2023-06-09 PROCEDURE — G0108 DIAB MANAGE TRN  PER INDIV: HCPCS | Mod: VID | Performed by: DIETITIAN, REGISTERED

## 2023-06-09 NOTE — PROGRESS NOTES
Diabetes Self-Management Education & Support/ 49 min    Presents for: Initial Assessment for new diagnosis    Type of service:  Video Visit    If the video visit is dropped, the video visit invitation should be resent by: Text to cell phone: [phone]    Originating Location (pt. Location): Home  Distant Location (provider location): [facility]  Mode of Communication:  Video Conference via [employer]    Video Start Time: 12:46  Video End Time (time video stopped): 1:35    How would patient like to obtain AVS? MyChart      Assessment Type:   ASSESSMENT:  Initial DM education for new diagnosis. Patient has a history of GDM and family history of Diabetes. Mariluz is testing her BG at home, 2-3x/day with FBS mainly in the 120's, and bedtime BG in the 120's. She is taking Metformin 1000 mg/day, plus Saxenda, current dose at 1.2 mg/daily, she is increased 0.6 mg weekly per discretion of weight mgmt PCP. Mariluz does not have a regular routine of exercise, but we did discuss adding in walking 2x/week for 30 minutes to establish a pattern of exercise.  She is following a low carbohydrate, high protein, high vegetable intake diet per the weight mgmt PCP.  Mariluz had an eye exam this spring, and she does have yearly dental cleanings.     Patient's most recent   Lab Results   Component Value Date    A1C 10.8 05/12/2023     is not meeting goal of <8.0    Diabetes knowledge and skills assessment:   Patient is knowledgeable in diabetes management concepts related to: Healthy Eating and Taking Medication    Continue education with the following diabetes management concepts: Being Active, Monitoring, Problem Solving and Reducing Risks    Based on learning assessment above, most appropriate setting for further diabetes education would be: Individual setting.      PLAN  1. Check blood sugar twice daily: fasting and two hours after a meal.  2. Exercise a minimum of 2x/week for 30 minutes, increase to goal  "of 150 minutes per week tolerated.   3. Continue to follow meal plan as prescribed by weight management program.   4. A1C goal is below 7%.     Topics to cover at upcoming visits: per patient request    Follow-up: PRN for Diabetes ed    See Care Plan for co-developed, patient-state behavior change goals.  AVS provided for patient today.    Education Materials Provided:  Living Healthy with Diabetes and My Plate Planner      SUBJECTIVE/OBJECTIVE:  Presents for: Initial Assessment for new diagnosis  Accompanied by: Self  Diabetes education in the past 24mo: No  Diabetes type: Type 2  Other concerns:: None  Cultural Influences/Ethnic Background:   or       Diabetes Symptoms & Complications:  Fatigue: Yes  Neuropathy: No  Polydipsia: Yes  Polyphagia: No  Polyuria: No  Visual change: Yes  Slow healing wounds: No  Symptom course: Improving  Weight trend: Decreasing       Patient Problem List and Family Medical History reviewed for relevant medical history, current medical status, and diabetes risk factors.    Vitals:  There were no vitals taken for this visit.  Estimated body mass index is 39.67 kg/m  as calculated from the following:    Height as of 5/26/23: 1.613 m (5' 3.5\").    Weight as of 5/26/23: 103.2 kg (227 lb 8 oz).   Last 3 BP:   BP Readings from Last 3 Encounters:   05/26/23 135/82   05/18/23 (!) 146/86   05/12/23 (!) 161/102       History   Smoking Status     Former     Packs/day: 1.50     Years: 10.00     Types: Cigarettes     Start date: 5/1/1991     Quit date: 5/1/2009   Smokeless Tobacco     Never       Labs:  Lab Results   Component Value Date    A1C 10.8 05/12/2023     Lab Results   Component Value Date     05/12/2023     Lab Results   Component Value Date     05/12/2023     Direct Measure HDL   Date Value Ref Range Status   05/12/2023 36 (L) >=50 mg/dL Final   ]  GFR Estimate   Date Value Ref Range Status   05/12/2023 >90 >60 mL/min/1.73m2 Final     Comment:     eGFR " calculated using 2021 CKD-EPI equation.   01/02/2019 >60 >60 mL/min/1.73m2 Final     GFR Estimate If Black   Date Value Ref Range Status   01/02/2019 >60 >60 mL/min/1.73m2 Final     Lab Results   Component Value Date    CR 0.63 05/12/2023     No results found for: MICROALBUMIN    Healthy Eating:  Healthy Eating Assessed Today: Yes  Meal planning/habits: Low carb  How many times a week on average do you eat food made away from home (restaurant/take-out)?: 1  Meals include: Breakfast, Lunch, Dinner  Breakfast: eggs, low cho tortilla, salsa, or sausage alexandra, or protein shake and protein bar(15 grams cho), 6 gms fiber  Lunch: salad/croutons/dsg, or deli meat, wheat thins, or leftovers  Dinner: more low cho options, small portion of pasta, some type of meat, vegetables  Snacks: loves chips, but has not had them, some type of fruit with lunch and breakfast.  Other: following low carb to 30 top end per meal, having snacks: protein bar, has cut down to one diet soda/day  Beverages: Water, Coffee    Being Active:  Being Active Assessed Today: Yes (maybe 1-2x/week, leydi, started since she found out she had DM)  Exercise:: Currently not exercising  Barrier to exercise: None    Monitoring:  Monitoring Assessed Today: Yes  Did patient bring glucose meter to appointment? : Yes  Blood Glucose Meter: Accu-chek  Times checking blood sugar at home (number): 3  Times checking blood sugar at home (per): Day  Blood glucose trend: Decreasing    FBS: per patient 120's  Bedtime: per patient 120's    Taking Medications:  Diabetes Medication(s)     Biguanides       metFORMIN (GLUCOPHAGE) 500 MG tablet    Take 1 tablet (500 mg) by mouth 2 times daily (with meals)          Taking Medication Assessed Today: Yes  Current Treatments: Diet, Oral Medication (taken by mouth), Non-insulin Injectables  Problems taking diabetes medications regularly?: No  Diabetes medication side effects?: Yes (at first she had diarrhea from metformin, but that has  since improved.)    Problem Solving:  Problem Solving Assessed Today: Yes  Is the patient at risk for hypoglycemia?: No  Is the patient at risk for DKA?: No              Reducing Risks:  Reducing Risks Assessed Today: Yes  Diabetes Risks: Age over 45 years, Sedentary Lifestyle, Family History, History of gestational diabetes  Additional female risks: Gestational Diabetes  Has dilated eye exam at least once a year?: Yes  Sees dentist every 6 months?: No (every 12 months)    Healthy Coping:  Healthy Coping Assessed Today: Yes  Emotional response to diabetes: Ready to learn, Concern for health and well-being  Stage of change: ACTION (Actively working towards change)  Patient Activation Measure Survey Score:      5/24/2023     8:21 PM   TAJ Score (Last Two)   TAJ Raw Score 36   Activation Score 75.5   TAJ Level 4         Care Plan and Education Provided:  Care Plan: Diabetes   Updates made by Edelmira King RD since 6/9/2023 12:00 AM      Problem: HbA1C Not In Goal       Goal: Establish Regular Follow-Ups with PCP       Task: Discuss with PCP the recommended timing for patient's next follow up visit(s)    Responsible User: Edelmira King RD      Task: Discuss schedule for PCP visits with patient    Responsible User: Edelmira King RD      Goal: Get HbA1C Level in Goal       Task: Educate patient on diabetes education self-management topics    Responsible User: Edelmira King RD      Task: Educate patient on benefits of regular glucose monitoring    Responsible User: Edelmira King RD      Task: Refer patient to appropriate extended care team member, as needed (Medication Therapy Management, Behavioral Health, Physical Therapy, etc.)    Responsible User: Edelmira King RD      Task: Discuss diabetes treatment plan with patient    Responsible User: Edelmira King RD      Problem: Diabetes Self-Management Education Needed to Optimize Self-Care Behaviors       Goal: Understand diabetes pathophysiology and disease  progression       Task: Provide education on diabetes pathophysiology and disease progression specfic to patient's diabetes type    Responsible User: Edelmira King RD      Goal: Healthy Eating - follow a healthy eating pattern for diabetes       Task: Provide education on portion control and consistency in amount, composition and timing of food intake    Responsible User: Edelmira King RD      Task: Provide education on managing carbohydrate intake (carbohydrate counting, plate planning method, etc.)    Responsible User: Edelmira King RD      Task: Provide education on weight management    Responsible User: Edelmira King RD      Task: Provide education on heart healthy eating    Responsible User: Edelmira King RD      Task: Provide education on eating out    Responsible User: Edelmira King RD      Task: Develop individualized healthy eating plan with patient    Responsible User: Edelmira King RD      Goal: Being Active - get regular physical activity, working up to at least 150 minutes per week       Task: Provide education on relationship of activity to glucose and precautions to take if at risk for low glucose    Responsible User: Edelmira King RD      Task: Discuss barriers to physical activity with patient    Responsible User: Edelmira King RD      Task: Develop physical activity plan with patient    Responsible User: Edelmira King RD      Task: Explore community resources including walking groups, assistance programs, and home videos    Responsible User: Edelmira King RD      Goal: Monitoring - monitor glucose and ketones as directed       Task: Provide education on blood glucose monitoring (purpose, proper technique, frequency, glucose targets, interpreting results, when to use glucose control solution, sharps disposal)    Responsible User: Edelmira King RD      Task: Provide education on continuous glucose monitoring (sensor placement, use of chanelle or /reader, understanding  glucose trends, alerts and alarms, differences between sensor glucose and blood glucose)    Responsible User: Edelmira King RD      Task: Provide education on ketone monitoring (when to monitor, frequency, etc.)    Responsible User: Edelmira King RD      Goal: Taking Medication - patient is consistently taking medications as directed       Task: Provide education on action of prescribed medication, including when to take and possible side effects    Responsible User: Edelmira King RD      Task: Provide education on insulin and injectable diabetes medications, including administration, storage, site selection and rotation for injection sites    Responsible User: Edelmira King RD      Task: Discuss barriers to medication adherence with patient and provide management technique ideas as appropriate    Responsible User: Edelmira King RD      Task: Provide education on frequency and refill details of medications    Responsible User: Edelmira King RD      Goal: Problem Solving - know how to prevent and manage short-term diabetes complications       Task: Provide education on high blood glucose - causes, signs/symptoms, prevention and treatment    Responsible User: Edelmira King RD      Task: Provide education on low blood glucose - causes, signs/symptoms, prevention, treatment, carrying a carbohydrate source at all times, and medical identification    Responsible User: Edelmira King RD      Task: Provide education on safe travel with diabetes    Responsible User: Edelmira King RD      Task: Provide education on how to care for diabetes on sick days    Responsible User: Edelmira King RD      Task: Provide education on when to call a health care provider    Responsible User: Edelmira King RD      Goal: Reducing Risks - know how to prevent and treat long-term diabetes complications       Task: Provide education on major complications of diabetes, prevention, early diagnostic measures and treatment of  complications    Responsible User: Edelmira iKng RD      Task: Provide education on recommended care for dental, eye and foot health    Responsible User: Edelmira King RD      Task: Provide education on Hemoglobin A1c - goals and relationship to blood glucose levels    Responsible User: Edelmira King RD      Task: Provide education on recommendations for heart health - lipid levels and goals, blood pressure and goals, and aspirin therapy, if indicated    Responsible User: Edelmira King RD      Task: Provide education on tobacco cessation    Responsible User: Edelmira King RD      Goal: Healthy Coping - use available resources to cope with the challenges of managing diabetes       Task: Discuss recognizing feelings about having diabetes    Responsible User: Edelmira King RD      Task: Provide education on the benefits of making appropriate lifestyle changes    Responsible User: Edelmiar King RD      Task: Provide education on benefits of utilizing support systems    Responsible User: dEelmira King RD      Task: Discuss methods for coping with stress    Responsible User: Edelmira King RD      Task: Provide education on when to seek professional counseling    Responsible User: Edelmira King RD              Time Spent: 49 minutes  Encounter Type: Individual    Any diabetes medication dose changes were made via the CDE Protocol per the patient's primary care provider. A copy of this encounter was shared with the provider.

## 2023-06-09 NOTE — LETTER
6/9/2023         RE: Mariluz Duron  [address]  [facility] [address]        Dear Colleague,    Thank you for referring your patient, Mariluz Duron, to the [facility]. Please see a copy of my visit note below.    Diabetes Self-Management Education & Support/ 49 min    Presents for: Initial Assessment for new diagnosis    Type of service:  Video Visit    If the video visit is dropped, the video visit invitation should be resent by: Text to cell phone: [phone]    Originating Location (pt. Location): Home  Distant Location (provider location): [facility]  Mode of Communication:  Video Conference via [employer]portPark[employer]    Video Start Time: 12:46  Video End Time (time video stopped): 1:35    How would patient like to obtain AVS? MyChart      Assessment Type:   ASSESSMENT:  Initial DM education for new diagnosis. Patient has a history of GDM and family history of Diabetes. Mariluz is testing her BG at home, 2-3x/day with FBS mainly in the 120's, and bedtime BG in the 120's. She is taking Metformin 1000 mg/day, plus Saxenda, current dose at 1.2 mg/daily, she is increased 0.6 mg weekly per discretion of weight mgmt PCP. Mariluz does not have a regular routine of exercise, but we did discuss adding in walking 2x/week for 30 minutes to establish a pattern of exercise.  She is following a low carbohydrate, high protein, high vegetable intake diet per the weight mgmt PCP.  Mariluz had an eye exam this spring, and she does have yearly dental cleanings.     Patient's most recent   Lab Results   Component Value Date    A1C 10.8 05/12/2023     is not meeting goal of <8.0    Diabetes knowledge and skills assessment:   Patient is knowledgeable in diabetes management concepts related to: Healthy Eating and Taking Medication    Continue education with the following diabetes management concepts: Being Active, Monitoring, Problem Solving and Reducing Risks    Based on learning  "assessment above, most appropriate setting for further diabetes education would be: Individual setting.      PLAN  1. Check blood sugar twice daily: fasting and two hours after a meal.  2. Exercise a minimum of 2x/week for 30 minutes, increase to goal of 150 minutes per week tolerated.   3. Continue to follow meal plan as prescribed by weight management program.   4. A1C goal is below 7%.     Topics to cover at upcoming visits: per patient request    Follow-up: PRN for Diabetes ed    See Care Plan for co-developed, patient-state behavior change goals.  AVS provided for patient today.    Education Materials Provided:  Living Healthy with Diabetes and My Plate Planner      SUBJECTIVE/OBJECTIVE:  Presents for: Initial Assessment for new diagnosis  Accompanied by: Self  Diabetes education in the past 24mo: No  Diabetes type: Type 2  Other concerns:: None  Cultural Influences/Ethnic Background:   or       Diabetes Symptoms & Complications:  Fatigue: Yes  Neuropathy: No  Polydipsia: Yes  Polyphagia: No  Polyuria: No  Visual change: Yes  Slow healing wounds: No  Symptom course: Improving  Weight trend: Decreasing       Patient Problem List and Family Medical History reviewed for relevant medical history, current medical status, and diabetes risk factors.    Vitals:  There were no vitals taken for this visit.  Estimated body mass index is 39.67 kg/m  as calculated from the following:    Height as of 5/26/23: 1.613 m (5' 3.5\").    Weight as of 5/26/23: 103.2 kg (227 lb 8 oz).   Last 3 BP:   BP Readings from Last 3 Encounters:   05/26/23 135/82   05/18/23 (!) 146/86   05/12/23 (!) 161/102       History   Smoking Status     Former     Packs/day: 1.50     Years: 10.00     Types: Cigarettes     Start date: 5/1/1991     Quit date: 5/1/2009   Smokeless Tobacco     Never       Labs:  Lab Results   Component Value Date    A1C 10.8 05/12/2023     Lab Results   Component Value Date     05/12/2023     Lab Results "   Component Value Date     05/12/2023     Direct Measure HDL   Date Value Ref Range Status   05/12/2023 36 (L) >=50 mg/dL Final   ]  GFR Estimate   Date Value Ref Range Status   05/12/2023 >90 >60 mL/min/1.73m2 Final     Comment:     eGFR calculated using 2021 CKD-EPI equation.   01/02/2019 >60 >60 mL/min/1.73m2 Final     GFR Estimate If Black   Date Value Ref Range Status   01/02/2019 >60 >60 mL/min/1.73m2 Final     Lab Results   Component Value Date    CR 0.63 05/12/2023     No results found for: MICROALBUMIN    Healthy Eating:  Healthy Eating Assessed Today: Yes  Meal planning/habits: Low carb  How many times a week on average do you eat food made away from home (restaurant/take-out)?: 1  Meals include: Breakfast, Lunch, Dinner  Breakfast: eggs, low cho tortilla, salsa, or sausage alexandra, or protein shake and protein bar(15 grams cho), 6 gms fiber  Lunch: salad/croutons/dsg, or deli meat, wheat thins, or leftovers  Dinner: more low cho options, small portion of pasta, some type of meat, vegetables  Snacks: loves chips, but has not had them, some type of fruit with lunch and breakfast.  Other: following low carb to 30 top end per meal, having snacks: protein bar, has cut down to one diet soda/day  Beverages: Water, Coffee    Being Active:  Being Active Assessed Today: Yes (maybe 1-2x/week, leydi, started since she found out she had DM)  Exercise:: Currently not exercising  Barrier to exercise: None    Monitoring:  Monitoring Assessed Today: Yes  Did patient bring glucose meter to appointment? : Yes  Blood Glucose Meter: Accu-chek  Times checking blood sugar at home (number): 3  Times checking blood sugar at home (per): Day  Blood glucose trend: Decreasing    FBS: per patient 120's  Bedtime: per patient 120's    Taking Medications:  Diabetes Medication(s)     Biguanides       metFORMIN (GLUCOPHAGE) 500 MG tablet    Take 1 tablet (500 mg) by mouth 2 times daily (with meals)          Taking Medication Assessed  Today: Yes  Current Treatments: Diet, Oral Medication (taken by mouth), Non-insulin Injectables  Problems taking diabetes medications regularly?: No  Diabetes medication side effects?: Yes (at first she had diarrhea from metformin, but that has since improved.)    Problem Solving:  Problem Solving Assessed Today: Yes  Is the patient at risk for hypoglycemia?: No  Is the patient at risk for DKA?: No              Reducing Risks:  Reducing Risks Assessed Today: Yes  Diabetes Risks: Age over 45 years, Sedentary Lifestyle, Family History, History of gestational diabetes  Additional female risks: Gestational Diabetes  Has dilated eye exam at least once a year?: Yes  Sees dentist every 6 months?: No (every 12 months)    Healthy Coping:  Healthy Coping Assessed Today: Yes  Emotional response to diabetes: Ready to learn, Concern for health and well-being  Stage of change: ACTION (Actively working towards change)  Patient Activation Measure Survey Score:      5/24/2023     8:21 PM   TAJ Score (Last Two)   TAJ Raw Score 36   Activation Score 75.5   TAJ Level 4         Care Plan and Education Provided:  Care Plan: Diabetes   Updates made by Edelmira King RD since 6/9/2023 12:00 AM      Problem: HbA1C Not In Goal       Goal: Establish Regular Follow-Ups with PCP       Task: Discuss with PCP the recommended timing for patient's next follow up visit(s)    Responsible User: Edelmira King RD      Task: Discuss schedule for PCP visits with patient    Responsible User: Edelmira King RD      Goal: Get HbA1C Level in Goal       Task: Educate patient on diabetes education self-management topics    Responsible User: Edelmira King RD      Task: Educate patient on benefits of regular glucose monitoring    Responsible User: Edelmira King RD      Task: Refer patient to appropriate extended care team member, as needed (Medication Therapy Management, Behavioral Health, Physical Therapy, etc.)    Responsible User: Edelmira King RD       Task: Discuss diabetes treatment plan with patient    Responsible User: Edelmira King RD      Problem: Diabetes Self-Management Education Needed to Optimize Self-Care Behaviors       Goal: Understand diabetes pathophysiology and disease progression       Task: Provide education on diabetes pathophysiology and disease progression specfic to patient's diabetes type    Responsible User: Edelmira King RD      Goal: Healthy Eating - follow a healthy eating pattern for diabetes       Task: Provide education on portion control and consistency in amount, composition and timing of food intake    Responsible User: Edelmira King RD      Task: Provide education on managing carbohydrate intake (carbohydrate counting, plate planning method, etc.)    Responsible User: Edelmira King RD      Task: Provide education on weight management    Responsible User: Edelmira King RD      Task: Provide education on heart healthy eating    Responsible User: Edelmira King RD      Task: Provide education on eating out    Responsible User: Edelmira King RD      Task: Develop individualized healthy eating plan with patient    Responsible User: Edelmira King RD      Goal: Being Active - get regular physical activity, working up to at least 150 minutes per week       Task: Provide education on relationship of activity to glucose and precautions to take if at risk for low glucose    Responsible User: Edelmira King RD      Task: Discuss barriers to physical activity with patient    Responsible User: Edelmira King RD      Task: Develop physical activity plan with patient    Responsible User: Edelmira King RD      Task: Explore community resources including walking groups, assistance programs, and home videos    Responsible User: Edelmira King RD      Goal: Monitoring - monitor glucose and ketones as directed       Task: Provide education on blood glucose monitoring (purpose, proper technique, frequency, glucose targets,  interpreting results, when to use glucose control solution, sharps disposal)    Responsible User: Edelmira King RD      Task: Provide education on continuous glucose monitoring (sensor placement, use of chanelle or /reader, understanding glucose trends, alerts and alarms, differences between sensor glucose and blood glucose)    Responsible User: Edelmira King RD      Task: Provide education on ketone monitoring (when to monitor, frequency, etc.)    Responsible User: Edelmira King RD      Goal: Taking Medication - patient is consistently taking medications as directed       Task: Provide education on action of prescribed medication, including when to take and possible side effects    Responsible User: Edelmira King RD      Task: Provide education on insulin and injectable diabetes medications, including administration, storage, site selection and rotation for injection sites    Responsible User: Edelmira King RD      Task: Discuss barriers to medication adherence with patient and provide management technique ideas as appropriate    Responsible User: Edelmira King RD      Task: Provide education on frequency and refill details of medications    Responsible User: Edelmira King RD      Goal: Problem Solving - know how to prevent and manage short-term diabetes complications       Task: Provide education on high blood glucose - causes, signs/symptoms, prevention and treatment    Responsible User: Edelmira King RD      Task: Provide education on low blood glucose - causes, signs/symptoms, prevention, treatment, carrying a carbohydrate source at all times, and medical identification    Responsible User: Edelmira King RD      Task: Provide education on safe travel with diabetes    Responsible User: Edelmira King RD      Task: Provide education on how to care for diabetes on sick days    Responsible User: Edelmira King RD      Task: Provide education on when to call a health care provider     Responsible User: Edelmira King RD      Goal: Reducing Risks - know how to prevent and treat long-term diabetes complications       Task: Provide education on major complications of diabetes, prevention, early diagnostic measures and treatment of complications    Responsible User: Edelmira King RD      Task: Provide education on recommended care for dental, eye and foot health    Responsible User: Edelmira King RD      Task: Provide education on Hemoglobin A1c - goals and relationship to blood glucose levels    Responsible User: Edelmira King RD      Task: Provide education on recommendations for heart health - lipid levels and goals, blood pressure and goals, and aspirin therapy, if indicated    Responsible User: Edelmira King RD      Task: Provide education on tobacco cessation    Responsible User: Edelmira King RD      Goal: Healthy Coping - use available resources to cope with the challenges of managing diabetes       Task: Discuss recognizing feelings about having diabetes    Responsible User: Edelmira King RD      Task: Provide education on the benefits of making appropriate lifestyle changes    Responsible User: Edelmira King RD      Task: Provide education on benefits of utilizing support systems    Responsible User: Edelmira King RD      Task: Discuss methods for coping with stress    Responsible User: Edelmira King RD      Task: Provide education on when to seek professional counseling    Responsible User: Edelmira King RD              Time Spent: 49 minutes  Encounter Type: Individual    Any diabetes medication dose changes were made via the CDE Protocol per the patient's primary care provider. A copy of this encounter was shared with the provider.

## 2023-06-09 NOTE — PATIENT INSTRUCTIONS
Check blood sugar twice daily: fasting and two hours after a meal.  Exercise a minimum of 2x/week for 30 minutes, increase to goal of 150 minutes per week tolerated.   Continue to follow meal plan as prescribed by weight management program.   A1C goal is below 7%.

## 2023-06-15 ENCOUNTER — TELEPHONE (OUTPATIENT)
Dept: GASTROENTEROLOGY | Facility: CLINIC | Age: 48
End: 2023-06-15
Payer: COMMERCIAL

## 2023-06-15 NOTE — TELEPHONE ENCOUNTER
"Endoscopy Scheduling Screen    Have you had a positive Covid test in the last 14 days?  No    Are you active on MyChart?   Yes    What insurance is in the chart?  Other:      Ordering/Referring Provider: Mónica Vera APRN CNP   (If ordering provider performs procedure, schedule with ordering provider unless otherwise instructed. )    BMI: Estimated body mass index is 39.67 kg/m  as calculated from the following:    Height as of 5/26/23: 1.613 m (5' 3.5\").    Weight as of 5/26/23: 103.2 kg (227 lb 8 oz).     Sedation Ordered  moderate sedation.   If patient BMI > 50 do not schedule in ASC.    Are you taking any prescription medications for pain?   No    Are you taking methadone or Suboxone?  No    Do you have a history of malignant hyperthermia or adverse reaction to anesthesia?  No    (Females) Are you currently pregnant?   No     Have you been diagnosed or told you have pulmonary hypertension?   No    Do you have an LVAD?  No    Have you been told you have moderate to severe sleep apnea?  No    Have you been told you have COPD, asthma, or any other lung disease?  No    Do you have any heart conditions?  No     Have you ever had or are you awaiting a heart or lung transplant?   No    Have you had a stroke or transient ischemic attack (TIA aka \"mini stroke\" in the last 6 months?   No    Have you been diagnosed with or been told you have cirrhosis of the liver?   No    Are you currently on dialysis?   No    Do you need assistance transferring?   No    BMI: Estimated body mass index is 39.67 kg/m  as calculated from the following:    Height as of 5/26/23: 1.613 m (5' 3.5\").    Weight as of 5/26/23: 103.2 kg (227 lb 8 oz).     Is patients BMI > 40 and scheduling location [facility]?  No    Do you take the medication Phentermine, Ozempic or Wegovy?  No    Do you take the medication Naltrexone?  No    Do you take blood thinners?  No    Preferred Pharmacy:    [id number] - [place], [place] - [address] AT [facility] [address]  [address]  [facility] [address]  Phone: [phone] Fax: [phone]      Prep   Are you currently on dialysis or do you have chronic kidney disease?  No (standard prep)    Do you have a diagnosis of diabetes?  Yes (Golytely Prep)    Do you have a diagnosis of cystic fibrosis (CF)?  No    On a regular basis do you go 3 -5 days between bowel movements?  No    BMI > 40?  No    Final Scheduling Details   Colonoscopy prep sent?  Standard Golytely    Procedure scheduled  COLONOSCOPY    Surgeon:  SOUTH     Date of procedure:  10/12/2023     Schedule PAC:   No    Location  [facility]    Sedation   MAC/Deep Sedation - PER LOCATION    Patient Reminders:    You will receive a call from a Nurse to review instructions and health history.  This assessment must be completed prior to your procedure.  Failure to complete the Nurse assessment may result in the procedure being cancelled.       On the day of your procedure, please designate an adult(s) who can drive you home stay with you for the next 24 hours. The medicines used in the exam will make you sleepy. You will not be able to drive.       You cannot take public transportation, ride share services, or non-medical taxi service without a responsible caregiver.  Medical transport services are allowed with the requirement that a responsible caregiver will receive you at your destination.  We require that drivers and caregivers are confirmed prior to your procedure.

## 2023-06-29 ENCOUNTER — MYC MEDICAL ADVICE (OUTPATIENT)
Dept: FAMILY MEDICINE | Facility: CLINIC | Age: 48
End: 2023-06-29
Payer: COMMERCIAL

## 2023-06-29 DIAGNOSIS — E66.812 CLASS 2 SEVERE OBESITY DUE TO EXCESS CALORIES WITH SERIOUS COMORBIDITY AND BODY MASS INDEX (BMI) OF 39.0 TO 39.9 IN ADULT (H): Primary | ICD-10-CM

## 2023-06-29 DIAGNOSIS — E11.9 TYPE 2 DIABETES MELLITUS WITHOUT COMPLICATION, WITHOUT LONG-TERM CURRENT USE OF INSULIN (H): ICD-10-CM

## 2023-06-29 DIAGNOSIS — E66.01 CLASS 2 SEVERE OBESITY DUE TO EXCESS CALORIES WITH SERIOUS COMORBIDITY AND BODY MASS INDEX (BMI) OF 39.0 TO 39.9 IN ADULT (H): ICD-10-CM

## 2023-06-29 DIAGNOSIS — E66.01 CLASS 2 SEVERE OBESITY DUE TO EXCESS CALORIES WITH SERIOUS COMORBIDITY AND BODY MASS INDEX (BMI) OF 39.0 TO 39.9 IN ADULT (H): Primary | ICD-10-CM

## 2023-06-29 DIAGNOSIS — E66.812 CLASS 2 SEVERE OBESITY DUE TO EXCESS CALORIES WITH SERIOUS COMORBIDITY AND BODY MASS INDEX (BMI) OF 39.0 TO 39.9 IN ADULT (H): ICD-10-CM

## 2023-06-29 DIAGNOSIS — R03.0 ELEVATED BLOOD PRESSURE READING IN OFFICE WITHOUT DIAGNOSIS OF HYPERTENSION: ICD-10-CM

## 2023-06-29 DIAGNOSIS — R73.03 PREDIABETES: ICD-10-CM

## 2023-06-29 DIAGNOSIS — E55.9 AVITAMINOSIS D: ICD-10-CM

## 2023-06-29 RX ORDER — SEMAGLUTIDE 1.7 MG/.75ML
1.7 INJECTION, SOLUTION SUBCUTANEOUS WEEKLY
Qty: 3 ML | Refills: 0 | Status: SHIPPED | OUTPATIENT
Start: 2023-06-29 | End: 2023-07-21

## 2023-06-29 NOTE — TELEPHONE ENCOUNTER
Patient has been on saxenda 3mg daily x2 weeks and tolerating fine. Please advise if she should continue on this or make transition to wegovy

## 2023-06-30 RX ORDER — LIRAGLUTIDE 6 MG/ML
INJECTION, SOLUTION SUBCUTANEOUS
Qty: 9 ML | Refills: 1 | OUTPATIENT
Start: 2023-06-30

## 2023-06-30 NOTE — TELEPHONE ENCOUNTER
"Routing refill request to provider for review/approval because:  Please review if appropriate for refilling now due to increasing dosages.    Last Written Prescription Date:  5/26/2023  Last Fill Quantity: 9,  # refills: 1   Last office visit provider:  5/26/2023     Requested Prescriptions   Pending Prescriptions Disp Refills     SAXENDA 18 MG/3ML pen [Pharmacy Med Name: SAXENDA 6MG/ML PEN INJ 3ML] 9 mL 1     Sig: ADMINISTER 0.6 MG UNDER THE SKIN DAILY. INCREASE DOSE EVERY 7 DAYS BY 0.6 MG AS TOLERATED. MAXIMUM DOSE 3.0 MG DAILY       GLP-1 Agonists Protocol Passed - 6/29/2023 12:04 PM        Passed - Order for Saxenda with diagnosis of diabetes        Passed - HgbA1C in past 3 or 6 months     If HgbA1C is 8 or greater, it needs to be on file within the past 3 months.  If less than 8, must be on file within the past 6 months.     Recent Labs   Lab Test 05/12/23  1516   A1C 10.8*             Passed - Medication is active on med list        Passed - Patient is age 18 or older        Passed - No active pregnancy on record        Passed - Normal serum creatinine on file in past 12 months     Recent Labs   Lab Test 05/12/23  1516   CR 0.63       Ok to refill medication if creatinine is low          Passed - No positive pregnancy test in past 12 months        Passed - Recent (6 mo) or future (30 days) visit within the authorizing provider's specialty     Patient had office visit in the last 6 months or has a visit in the next 30 days with authorizing provider.  See \"Patient Info\" tab in inbasket, or \"Choose Columns\" in Meds & Orders section of the refill encounter.                 Rosaline Martinez RN 06/30/23 2:04 AM  "

## 2023-07-14 ENCOUNTER — MYC MEDICAL ADVICE (OUTPATIENT)
Dept: FAMILY MEDICINE | Facility: CLINIC | Age: 48
End: 2023-07-14
Payer: COMMERCIAL

## 2023-07-14 ENCOUNTER — TELEPHONE (OUTPATIENT)
Dept: FAMILY MEDICINE | Facility: CLINIC | Age: 48
End: 2023-07-14
Payer: COMMERCIAL

## 2023-07-14 DIAGNOSIS — E66.01 CLASS 2 SEVERE OBESITY DUE TO EXCESS CALORIES WITH SERIOUS COMORBIDITY AND BODY MASS INDEX (BMI) OF 39.0 TO 39.9 IN ADULT (H): ICD-10-CM

## 2023-07-14 DIAGNOSIS — E66.812 CLASS 2 SEVERE OBESITY DUE TO EXCESS CALORIES WITH SERIOUS COMORBIDITY AND BODY MASS INDEX (BMI) OF 39.0 TO 39.9 IN ADULT (H): ICD-10-CM

## 2023-07-14 DIAGNOSIS — E11.9 TYPE 2 DIABETES MELLITUS WITHOUT COMPLICATION, WITHOUT LONG-TERM CURRENT USE OF INSULIN (H): ICD-10-CM

## 2023-07-14 NOTE — TELEPHONE ENCOUNTER
Central Prior Authorization Team - Phone: [phone]     PA Initiation    Medication: WEGOVY 1.7 MG/0.75ML SC SOAJ  Insurance Company: [employer] - Phone [phone] Fax [phone]  Pharmacy Filling the Rx: [id number] [place], [place] - [address] AT [facility] OF [address]  Filling Pharmacy Phone: [phone]  Filling Pharmacy Fax:    Start Date: 7/14/2023

## 2023-07-14 NOTE — TELEPHONE ENCOUNTER
Patient only has 2 days left of current transitioning medication, requesting expedited PA for medication continuation.

## 2023-07-14 NOTE — TELEPHONE ENCOUNTER
Prior Authorization Request  Who s requesting:  Pharmacy and Patient  Pharmacy Name and Location: [facility] #[id number] [place], [place]  Medication Name: Semaglutide-Weight Management (WEGOVY) 1.7 MG/0.75ML pen  Insurance Plan:[facility] CHOICE  Insurance Member ID Number:  [id number]  CoverMyMeds Key: [id number]  Informed patient that prior authorizations can take up to 10 business days for response:   Yes  Okay to leave a detailed message: No     Route to pool:  [facility]

## 2023-07-17 NOTE — TELEPHONE ENCOUNTER
Central Prior Authorization Team - Phone: [phone]     Prior Authorization Approval    Medication: WEGOVY 1.7 MG/0.75ML SC SOAJ  Authorization Effective Date: 7/14/2023  Authorization Expiration Date: 2/9/2024  Approved Dose/Quantity: 3  Reference #:     Insurance Company: [employer] - Phone [phone] Fax [phone]  Expected CoPay:       CoPay Card Available:      Financial Assistance Needed:   Which Pharmacy is filling the prescription: [facility] #[id number] [place], [place] - [address] AT [facility] [address]  Pharmacy Notified: Yes  Patient Notified: Yes pharmacy will notify when ready

## 2023-07-28 ENCOUNTER — OFFICE VISIT (OUTPATIENT)
Dept: FAMILY MEDICINE | Facility: CLINIC | Age: 48
End: 2023-07-28
Payer: COMMERCIAL

## 2023-07-28 ENCOUNTER — TELEPHONE (OUTPATIENT)
Dept: FAMILY MEDICINE | Facility: CLINIC | Age: 48
End: 2023-07-28

## 2023-07-28 VITALS
SYSTOLIC BLOOD PRESSURE: 119 MMHG | WEIGHT: 215.38 LBS | BODY MASS INDEX: 36.77 KG/M2 | RESPIRATION RATE: 16 BRPM | TEMPERATURE: 99.2 F | HEART RATE: 65 BPM | HEIGHT: 64 IN | OXYGEN SATURATION: 98 % | DIASTOLIC BLOOD PRESSURE: 80 MMHG

## 2023-07-28 DIAGNOSIS — R03.0 ELEVATED BLOOD PRESSURE READING IN OFFICE WITHOUT DIAGNOSIS OF HYPERTENSION: ICD-10-CM

## 2023-07-28 DIAGNOSIS — E66.812 CLASS 2 SEVERE OBESITY DUE TO EXCESS CALORIES WITH SERIOUS COMORBIDITY AND BODY MASS INDEX (BMI) OF 37.0 TO 37.9 IN ADULT (H): ICD-10-CM

## 2023-07-28 DIAGNOSIS — E66.01 CLASS 2 SEVERE OBESITY DUE TO EXCESS CALORIES WITH SERIOUS COMORBIDITY AND BODY MASS INDEX (BMI) OF 37.0 TO 37.9 IN ADULT (H): ICD-10-CM

## 2023-07-28 DIAGNOSIS — E11.9 TYPE 2 DIABETES MELLITUS WITHOUT COMPLICATION, WITHOUT LONG-TERM CURRENT USE OF INSULIN (H): Primary | ICD-10-CM

## 2023-07-28 PROCEDURE — 99214 OFFICE O/P EST MOD 30 MIN: CPT | Performed by: FAMILY MEDICINE

## 2023-07-28 ASSESSMENT — ENCOUNTER SYMPTOMS: CONSTITUTIONAL NEGATIVE: 1

## 2023-07-28 NOTE — PROGRESS NOTES
Assessment & Plan   Problem List Items Addressed This Visit       Class 2 severe obesity due to excess calories with serious comorbidity and body mass index (BMI) of 37.0 to 37.9 in adult (H)     47 year old year old female in clinic today to discuss treatment of the following conditions through diet and lifestyle modification and weight loss:  1. Type 2 diabetes mellitus without complication, without long-term current use of insulin (H)    2. Class 2 severe obesity due to excess calories with serious comorbidity and body mass index (BMI) of 37.0 to 37.9 in adult (H)    3. Elevated blood pressure reading in office without diagnosis of hypertension    The patient's weight loss result since the last visit was successful based on weight loss.  Since starting a GLP-1 receptor agonist she is down approximately 7.3%.  Minimal side effects with liraglutide and minimal side effects with semaglutide.  We will finish the titration and she will advance her dose to 2.4 mg/week.  We discussed use of continuous glucose monitoring to evaluate the effect of certain types of foods.  She is executing a calorically restricted, high-protein, low carbohydrate diet.  We discussed how some of the dietary recommendations that I have recommended differ from standard of care American diabetes Association recommendations to consume 30-45 g of carbohydrates with each meal.  I believe her current plan is sustainable and reasonable.  I will need to see her in 3 months as part of the approval process for ongoing use of semaglutide.           Relevant Medications    Semaglutide-Weight Management (WEGOVY) 2.4 MG/0.75ML pen    RESOLVED: Elevated blood pressure reading in office without diagnosis of hypertension     Improved with dietary changes and nutrition.          Type 2 diabetes mellitus without complication, without long-term current use of insulin (H) - Primary     Diabetic control greatly improved.  She has been doing daily glucometer  "readings.  Weight loss as discussed elsewhere.  Blood pressure improved.  Tolerant to statin therapy.  Tolerant to aspirin therapy.  She is a bit early for a hemoglobin A1c recheck.  Anticipate she will be at goal with a recheck.  This will be done in the outpatient lab at [facility] in a couple of weeks.  Doing well.         Relevant Medications    Semaglutide-Weight Management (WEGOVY) 2.4 MG/0.75ML pen    Continuous Blood Gluc Sensor (FREESTYLE HELEN 2 SENSOR) MISC    Other Relevant Orders    Albumin Random Urine Quantitative with Creat Ratio    Lipid panel reflex to direct LDL Fasting        Len Melchor MD  [facility]    Logan Mcgraw is a 47 year old, presenting for the following health issues:  Weight Loss (Follow-up/)        7/28/2023     8:08 AM   Additional Questions   Roomed by sac   Accompanied by self         7/28/2023     8:08 AM   Patient Reported Additional Medications   Patient reports taking the following new medications no       Patient presents for treatment of chronic, comorbid conditions using intensive therapeutic lifestyle interventions including nutrition, physical activity, and behavior management.   - successes: blood sugars generally better/good.  She has been checking BG 1-2x/day.  High recently was 170 without cause. Improved nutrition.     - struggles: family is eating a different menu.  \"Some of them.\"   - planned vacation.   - ad brittney eating.  She is tracking to protein and generally getting around 70 grams per day.  Protein shake, greek yogurt.  Some animal based protein,   - medication: mild constipation.  Better.     - clothes are fitting better.        Review of Systems   Constitutional: Negative.    All other systems reviewed and are negative.           Objective    /80 (BP Location: Left arm, Patient Position: Sitting, Cuff Size: Adult Large)   Pulse 65   Temp 99.2  F (37.3  C) (Oral)   Resp 16   Ht 1.613 m (5' 3.5\")   Wt 97.7 kg " (215 lb 6 oz)   SpO2 98%   BMI 37.55 kg/m    Body mass index is 37.55 kg/m .  Physical Exam  Nursing note reviewed.   Constitutional:       General: She is not in acute distress.     Appearance: Normal appearance. She is not ill-appearing.   HENT:      Head: Normocephalic and atraumatic.   Eyes:      Extraocular Movements: Extraocular movements intact.      Conjunctiva/sclera: Conjunctivae normal.   Pulmonary:      Effort: Pulmonary effort is normal.   Neurological:      Mental Status: She is alert and oriented to person, place, and time.   Psychiatric:         Attention and Perception: Attention normal.         Mood and Affect: Mood normal.         Speech: Speech normal.         Thought Content: Thought content normal.

## 2023-07-28 NOTE — ASSESSMENT & PLAN NOTE
47 year old year old female in clinic today to discuss treatment of the following conditions through diet and lifestyle modification and weight loss:  1. Type 2 diabetes mellitus without complication, without long-term current use of insulin (H)    2. Class 2 severe obesity due to excess calories with serious comorbidity and body mass index (BMI) of 37.0 to 37.9 in adult (H)    3. Elevated blood pressure reading in office without diagnosis of hypertension      The patient's weight loss result since the last visit was successful based on weight loss.  Since starting a GLP-1 receptor agonist she is down approximately 7.3%.  Minimal side effects with liraglutide and minimal side effects with semaglutide.  We will finish the titration and she will advance her dose to 2.4 mg/week.  We discussed use of continuous glucose monitoring to evaluate the effect of certain types of foods.  She is executing a calorically restricted, high-protein, low carbohydrate diet.  We discussed how some of the dietary recommendations that I have recommended differ from standard of care American diabetes Association recommendations to consume 30-45 g of carbohydrates with each meal.  I believe her current plan is sustainable and reasonable.  I will need to see her in 3 months as part of the approval process for ongoing use of semaglutide.

## 2023-07-28 NOTE — Clinical Note
Shared patient.  She is doing well and I believe that her hemoglobin A1c will be at goal.  I think the appointment she is scheduled on 8/17 with you will be a bit redundant.  Wonder if she should cancel and check in with you later?  Thoughts?

## 2023-07-28 NOTE — TELEPHONE ENCOUNTER
PRIOR AUTHORIZATION DENIED    Med[place]ST[place]EN 2 [place] [facility] [place] [employer] - Phone [phone] Fax [phone]  Denial Date: 7/28/2023  Denial Rational:     Appeal Information:     Patient Notified: No

## 2023-07-28 NOTE — ASSESSMENT & PLAN NOTE
Diabetic control greatly improved.  She has been doing daily glucometer readings.  Weight loss as discussed elsewhere.  Blood pressure improved.  Tolerant to statin therapy.  Tolerant to aspirin therapy.  She is a bit early for a hemoglobin A1c recheck.  Anticipate she will be at goal with a recheck.  This will be done in the outpatient lab at [facility] in a couple of weeks.  Doing well.

## 2023-08-03 NOTE — PROGRESS NOTES
Patient rescheduled will get labs one at [facility] [employer] where she works mid month

## 2023-08-08 DIAGNOSIS — E11.9 TYPE 2 DIABETES MELLITUS WITHOUT COMPLICATION, WITHOUT LONG-TERM CURRENT USE OF INSULIN (H): ICD-10-CM

## 2023-08-08 NOTE — TELEPHONE ENCOUNTER
"Last Written Prescription Date:  5/26/23  Last Fill Quantity: 360,  # refills: 0   Last office visit provider:  7/28/23     Requested Prescriptions   Pending Prescriptions Disp Refills    metFORMIN (GLUCOPHAGE) 500 MG tablet 360 tablet 1     Sig: Take 1 tablet (500 mg) by mouth 2 times daily (with meals)       Biguanide Agents Passed - 8/8/2023 11:54 AM        Passed - Patient is age 10 or older        Passed - Patient has documented A1c within the specified period of time.     If HgbA1C is 8 or greater, it needs to be on file within the past 3 months.  If less than 8, must be on file within the past 6 months.     Recent Labs   Lab Test 05/12/23  1516   A1C 10.8*             Passed - Patient's CR is NOT>1.4 OR Patient's EGFR is NOT<45 within past 12 mos.     Recent Labs   Lab Test 05/12/23  1516 01/02/19  1223   GFRESTIMATED >90 >60   GFRESTBLACK  --  >60       Recent Labs   Lab Test 05/12/23  1516   CR 0.63             Passed - Patient does NOT have a diagnosis of CHF.        Passed - Medication is active on med list        Passed - Patient is not pregnant        Passed - Patient has not had a positive pregnancy test within the past 12 mos.         Passed - Recent (6 mo) or future (30 days) visit within the authorizing provider's specialty     Patient had office visit in the last 6 months or has a visit in the next 30 days with authorizing provider or within the authorizing provider's specialty.  See \"Patient Info\" tab in inbasket, or \"Choose Columns\" in Meds & Orders section of the refill encounter.                 Mimi Veloz 08/08/23 2:38 PM  "

## 2023-08-08 NOTE — TELEPHONE ENCOUNTER
Requesting new RX with updated sig. Please advise.    NEW SIG: Day 1-4: 1 tablet by mouth in AM,   Day 5-12: 1 in AM, 1 in PM.  Day 13-17: 2 in AM, 1 in PM.  Then 2 in the AM, 2 in the PM thereafter  Requested qty of 360.      Medication Request  Medication name: metFORMIN (GLUCOPHAGE) 500 MG tablet   Requested Pharmacy: [id number]  When was patient last seen for this?:  07/28/23  Patient offered appointment:  [facility] pharmacy request.  Okay to leave a detailed message: no

## 2023-08-10 ENCOUNTER — MYC MEDICAL ADVICE (OUTPATIENT)
Dept: FAMILY MEDICINE | Facility: CLINIC | Age: 48
End: 2023-08-10
Payer: COMMERCIAL

## 2023-08-10 DIAGNOSIS — E11.9 TYPE 2 DIABETES MELLITUS WITHOUT COMPLICATION, WITHOUT LONG-TERM CURRENT USE OF INSULIN (H): ICD-10-CM

## 2023-09-14 ENCOUNTER — OFFICE VISIT (OUTPATIENT)
Dept: RHEUMATOLOGY | Facility: CLINIC | Age: 48
End: 2023-09-14
Payer: COMMERCIAL

## 2023-09-14 ENCOUNTER — LAB (OUTPATIENT)
Dept: LAB | Facility: CLINIC | Age: 48
End: 2023-09-14
Payer: COMMERCIAL

## 2023-09-14 ENCOUNTER — HOSPITAL ENCOUNTER (OUTPATIENT)
Dept: GENERAL RADIOLOGY | Facility: HOSPITAL | Age: 48
Discharge: HOME | End: 2023-09-14
Attending: INTERNAL MEDICINE
Payer: COMMERCIAL

## 2023-09-14 VITALS
BODY MASS INDEX: 35.55 KG/M2 | DIASTOLIC BLOOD PRESSURE: 82 MMHG | WEIGHT: 203.9 LBS | SYSTOLIC BLOOD PRESSURE: 130 MMHG | HEART RATE: 74 BPM

## 2023-09-14 DIAGNOSIS — M25.50 POLYARTHRALGIA: ICD-10-CM

## 2023-09-14 DIAGNOSIS — E11.9 TYPE 2 DIABETES MELLITUS WITHOUT COMPLICATION, WITHOUT LONG-TERM CURRENT USE OF INSULIN (H): Primary | ICD-10-CM

## 2023-09-14 DIAGNOSIS — M25.50 HYPERMOBILITY ARTHRALGIA: ICD-10-CM

## 2023-09-14 DIAGNOSIS — M15.0 PRIMARY OSTEOARTHRITIS INVOLVING MULTIPLE JOINTS: ICD-10-CM

## 2023-09-14 DIAGNOSIS — M25.50 POLYARTHRALGIA: Primary | ICD-10-CM

## 2023-09-14 LAB
CREAT UR-MCNC: 238 MG/DL
CRP SERPL-MCNC: 4.88 MG/L
ERYTHROCYTE [SEDIMENTATION RATE] IN BLOOD BY WESTERGREN METHOD: 20 MM/HR (ref 0–20)
HBA1C MFR BLD: 5.8 % (ref 0–5.6)
MICROALBUMIN UR-MCNC: 17.2 MG/L
MICROALBUMIN/CREAT UR: 7.23 MG/G CR (ref 0–25)
URATE SERPL-MCNC: 4.6 MG/DL (ref 2.4–5.7)

## 2023-09-14 PROCEDURE — 86200 CCP ANTIBODY: CPT

## 2023-09-14 PROCEDURE — 82043 UR ALBUMIN QUANTITATIVE: CPT

## 2023-09-14 PROCEDURE — 85652 RBC SED RATE AUTOMATED: CPT

## 2023-09-14 PROCEDURE — 73630 X-RAY EXAM OF FOOT: CPT | Mod: 50

## 2023-09-14 PROCEDURE — 86140 C-REACTIVE PROTEIN: CPT

## 2023-09-14 PROCEDURE — 83036 HEMOGLOBIN GLYCOSYLATED A1C: CPT

## 2023-09-14 PROCEDURE — 99205 OFFICE O/P NEW HI 60 MIN: CPT | Performed by: INTERNAL MEDICINE

## 2023-09-14 PROCEDURE — 86431 RHEUMATOID FACTOR QUANT: CPT

## 2023-09-14 PROCEDURE — 73130 X-RAY EXAM OF HAND: CPT | Mod: 50

## 2023-09-14 PROCEDURE — 82570 ASSAY OF URINE CREATININE: CPT

## 2023-09-14 PROCEDURE — 73522 X-RAY EXAM HIPS BI 3-4 VIEWS: CPT

## 2023-09-14 PROCEDURE — 84550 ASSAY OF BLOOD/URIC ACID: CPT

## 2023-09-14 PROCEDURE — 36415 COLL VENOUS BLD VENIPUNCTURE: CPT

## 2023-09-14 RX ORDER — DULOXETIN HYDROCHLORIDE 30 MG/1
30 CAPSULE, DELAYED RELEASE ORAL DAILY
Qty: 30 CAPSULE | Refills: 2 | Status: SHIPPED | OUTPATIENT
Start: 2023-09-14 | End: 2023-11-14

## 2023-09-14 NOTE — PROGRESS NOTES
This document was created using a software with less than 100% fidelity, at times resulting in unintended, even erroneous syntax and grammar.  The reader is advised to keep this under consideration while reviewing, interpreting this note.      Rheumatology Consult Note      Mariluz Duron     YOB: 1975 Age: 48 year old    Date of visit: 9/14/23    PCP: Mónica Vera    Chief Complaint   Patient presents with:  Consult      Assessment and Plan     Mariluz was seen today for consult.    Diagnoses and all orders for this visit:    Polyarthralgia  -     XR Pelvis and Hip Bilateral 2 Views; Future  -     XR Hand Bilateral G/E 3 Views; Future  -     XR Foot Bilateral G/E 3 Views; Future  -     Erythrocyte sedimentation rate auto; Future  -     Cyclic Citrullinated Peptide Antibody IgG; Future  -     CRP inflammation; Future  -     Uric acid; Future  -     Rheumatoid factor; Future  -     DULoxetine (CYMBALTA) 30 MG capsule; Take 1 capsule (30 mg) by mouth daily for 30 days    Primary osteoarthritis involving multiple joints  -     XR Pelvis and Hip Bilateral 2 Views; Future  -     XR Hand Bilateral G/E 3 Views; Future  -     XR Foot Bilateral G/E 3 Views; Future  -     DULoxetine (CYMBALTA) 30 MG capsule; Take 1 capsule (30 mg) by mouth daily for 30 days    Hypermobility arthralgia  -     XR Foot Bilateral G/E 3 Views; Future           This patient presents with further worsening of polyarthralgias.  She has osteoarthritis.  This is relatively premature in the context of her hypermobility syndrome which she has fewer findings than she had 10 years ago in part due to painful joints.  We will start duloxetine major side effects outlined.  While the likelihood of inflammatory joint disease or connective tissue disease appears to be low nonetheless if will be important to check labs and x-rays as noted.  X-rays of the hands taken today, personally reviewed the films, findings: She has extensive  "degenerative changes loss of joint space sclerosis and osteophyte formation in the DIPs especially of the right hand digits, without erosions such as in the metacarpophalangeal joints, pencil in cup morphology or chondrocalcinosis.  We will meet here in 2 months.    Return to clinic: 2 months      HPI   Mariluz Duron is a 48 year old female  is seen today for evaluation of polyarthralgias.  These have troubled her for the past more than 10 years.  She was first seen in 2014.  She had ample evidence of osteoarthritis even at that stage in the background of hypermobility, having known herself to have \"double jointed nests\" since childhood.  At no point she or her family members have had  psoriasis.  Her original symptoms seem to have started around 2012 2013 timeframe when she started experiencing pain in her DIPs especially in the left hand index and then the right side corresponding number.  There was associated deformity.  Even at that time she had painful Heberden's..  At 1 point there was some suggestion that she might have inflammatory joint disease hydroxychloroquine was started she had some improvement.  In 2019 she was seen here for the last time till today.  The reason she was not able to follow-up was health issues for now 22-year-old son who had had some traumatic experiences such as suicide of his best friend and discovering the body of his dad.  Now he is feeling better and she wants to go back to managing her own health.  In the interim she has developed diabetes.  She had rotator cuff impingement on the left shoulder and had a corticosteroid injection as well as ACL tear and repair of the left knee.      She reports that the joint pains continue to trouble her on daily basis the worst of the symptoms are in the hands, hips, shoulders and feet.  She points to the DIPs of both hands especially index and middle, the first CMC areas of both sides, left shoulder more painful than the right, she has had " "painful hips it feels as if especially the left hip gets \"out of socket, it \"clicks\".  She has had pain level as moderately severe intensity all these joint areas can be stiff for up to 2 hours in the morning.  In the interim she has been taking acetaminophen, ibuprofen.  Which provide partial relief.  On the left foot she has felt a lump in the midfoot area which is painful on the right foot is the first MTP and surrounding toes.               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 She is here for evaluation of joint pains,  especially the index finger DIPs. She recalls that she as a child was  thought to be \"double jointed.\" She could move her DIP in somewhat an  unusual fashion and sometimes \"lock those.\" Over the years, she did not  notice any significant abnormality. About 12 months ago, she began to notice  that her left hand DIP more so than the right index finger DIP had beginning  to become abnormal. There was pain. There was some redness and deformity  developing which has gone on during the subsequent 12 months. She describes  that this is worse with activity. The more she uses the hands, the worse it  gets and she, being an ER tech, ends up using her hands quite a lot of moving  patients, doing EKGs, typing data and other activities. She has noted some  discomfort in her feet as well, especially the 1st MTPs bilaterally. The  worst is when she is up and about. She has taken Aleve with some benefit.   She is not \"too bad\" when she wakes up. She works 3rd shift. She has noted  mild discomfort in the mid thoracic area, which is worse with activity. She  has no features to suggest spondylitis. She has not had features of iritis.   More recently, she had tenderness in her right elbow. She has no family  history as far as she is aware of psoriasis or personal. Amongst " "her  13-point review of systems, she noted sometimes heartburn, impaired sleep,  she works at 11 p.m. to 7:30 a.m. for the past 15 years. The rest of the  13-point ROS is negative. She rated her pain as moderately severe,  interfering with some of the day-to-day activities as noted MDHAQ and scanned  in the chart.      FAMILY HISTORY: Negative for psoriasis, colitis, rheumatoid arthritis. She  does not think that hands are similar to hers.      On physical examination, she is a well-appearing, comfortable female. Vital  data as recorded.  Eyes are without inflammation. Examination of neck is without  lymphadenopathy. On skin examination, she has her nails painted. There are  no psoriatic lesions, malar area eruption.  Examination of the joints reveals that she has tenderness and swelling at the  DIPs of both the index finger, worse on the left side with associated  deformity. In addition, she is tender in her 5th digit DIP as well as the  1st MTPs bilaterally. She has no other joint areas affected with tenderness,  swelling, or impaired range of motion. She does not have tenderness across  the trapezius or along the spine. She is alert and oriented. Speech is  fluent. Tone and power in upper and lower extremities symmetrically normal.   Cardiopulmonary examination within normal range.      ASSESSMENT AND PLAN: This 38-year-old patient presents with joint pains.   She has ample evidence of a degenerative arthropathy affecting the distal  interphalangeal of index finger bilaterally, and the beginning of the same  process in the 5th digit distal interphalangeal. She likely has first  metatarsophalangeal involvement. Today, we talked about the differential and  the cause of such a premature involvement at the age of 38. Her concern that  this is rheumatoid arthritis was reassured that this is not rheumatoid  arthritis. She does not have any history of psoriasis herself or the family.  Whether being \"double " "jointed/hypermobile\" having of hypermobile joints as a  child, she could put her distal interphalangeals at risk from that given what  she described noted earlier on. Given the amount of inflammation that she  has in the distal interphalangeals of the index fingers, it will be  reasonable for her to consider some intervention. Various were discussed.   She wants to proceed with hydroxychloroquine, starting her on 200 mg twice  daily. I have asked her to have x-rays of the feet taken. Recent labs  including negative rheumatoid factor, RAHEEM were reviewed as well as CRP and  sed rate. I have asked her to come back in 3 months.    Active Problem List     Patient Active Problem List   Diagnosis    Herpes Simplex Acute Gingivostomatitis    Joint Pain Fingers    Polyarthritis Of The Hand    Arthralgias In Multiple Sites    Generalized Osteoarthritis Of The Hand    High risk medication use    History of gestational diabetes    Gastroesophageal reflux disease without esophagitis    Class 2 severe obesity due to excess calories with serious comorbidity and body mass index (BMI) of 37.0 to 37.9 in adult (H)    Avitaminosis D    Hypermobility arthralgia    Routine general medical examination at a [facility] facility    Dysuria    Type 2 diabetes mellitus without complication, without long-term current use of insulin (H)     Past Medical History     Past Medical History:   Diagnosis Date    Arthritis 5+ years ago    Diabetes (H) 5-12-23    Elevated blood pressure reading in office without diagnosis of hypertension 05/12/2023    Hypertension 5-11-23     Past Surgical History     Past Surgical History:   Procedure Laterality Date    ABDOMEN SURGERY  ?    I had an ovarian cyst removed and i also did IVF    GENITOURINARY SURGERY  2009?    Ovarian cyst removed    LASER ABLATION      OVARIAN CYST REMOVAL       Allergy   No Known Allergies  Current Medication List     Current Outpatient Medications   Medication Sig    aspirin 81 MG EC " tablet Take 1 tablet (81 mg) by mouth daily    blood glucose (NO BRAND SPECIFIED) lancets standard Use to test blood sugar three times daily or as directed.    blood glucose (NO BRAND SPECIFIED) test strip Use to test blood sugar three times daily or as directed.    blood glucose monitoring (NO BRAND SPECIFIED) meter device kit Use to test blood sugar 2 times daily or as directed.    Continuous Blood Gluc Sensor (FREESTYLE HELEN 2 SENSOR) MISC Change every 14 days.    metFORMIN (GLUCOPHAGE) 500 MG tablet Take 1 tablet (500 mg) by mouth 2 times daily (with meals)    Semaglutide-Weight Management (WEGOVY) 2.4 MG/0.75ML pen Inject 2.4 mg Subcutaneous once a week    simvastatin (ZOCOR) 40 MG tablet Take 1 tablet (40 mg) by mouth At Bedtime    valACYclovir (VALTREX) 1000 mg tablet [VALACYCLOVIR (VALTREX) 1000 MG TABLET] TAKE 2 TABLETS BY MOUTH 1 TIME. REPEAT WITH 2 TABLETS IN 12 HOURS     No current facility-administered medications for this visit.            Family History     Family History   Problem Relation Age of Onset    Diabetes Mother     Osteoporosis Mother     Sleep Apnea Mother     Coronary Artery Disease Mother     Anxiety Disorder Mother     Obesity Mother     Hyperlipidemia Father     Hypertension Father     Obesity Father     Obesity Brother     Heart Disease Paternal Grandmother     No Known Problems Daughter     No Known Problems Son     Breast Cancer No family hx of     Colon Cancer No family hx of     Prostate Cancer No family hx of     Ovarian Cancer No family hx of     Skin Cancer No family hx of          Physical Exam     COMPREHENSIVE EXAMINATION:  Vitals:    09/14/23 1057   BP: 130/82   Pulse: 74     A well appearing alert oriented female. Vital data as noted above. Her eyes examined for inflammation/scleromalacia. ENT examined for oral mucositis, moisture, thrush, nasal deformity, external ear redness, deformity. Her neck is examined for suppleness and lymphadenopathy.  Cardiopulmonary  examination without dyspnea at rest, use of accessory muscles of breathing, wheezing, edema, peripheral or central cyanosis.  Abdomen examined for softness, tenderness, obvious organomegaly.  Skin examined for heliotrope, malar area eruption, lupus pernio, periungual erythema, sclerodactyly, papules, erythema nodosum, purpura, nail pitting, onycholysis, and obvious psoriasis lesion. Neurological examination done for alertness, speech, facial symmetry,  tone and power in upper and lower extremities, and gait. The joint examination is performed for swelling, tenderness, warmth, erythema, and range of motion in the following joints: DIPs, PIPs, MCPs, wrists, first CMC's, elbows, shoulders, hips, knees, ankles, feet; spine for range of motion and paraspinal muscles for tenderness. The salient normal / abnormal findings are appended.  She has markedly tender Heberden nodes, she has squaring of the first CMC where she has crepitus and tenderness and minimal grinding.  She has painful range of motion at the hip joints which otherwise complete, in the background of her hypermobility, she has midfoot osteophyte formation on the left side the right first MTP and second and third MTP were tender.  There is no dactylitis of digits or toes or enthesopathy.    Labs / Imaging (select studies)     No results found for: PPDINDURATIO, PPDREDNESS, TBGOLT, RHF, CCPABG, URIC, [id number], [id number], ANTIDNA, ANTIDNAINT, CARIA, [id number], [id number], ENASM, ENASCL, JO1, ENASSA, ENASSB, CENTA, K9GQHTG, W9VNCCP, CO3080   Hemoglobin   Date Value Ref Range Status   05/12/2023 15.2 11.7 - 15.7 g/dL Final   01/02/2019 14.6 12.0 - 16.0 g/dL Final   02/06/2018 14.1 12.0 - 16.0 g/dL Final     Urea Nitrogen   Date Value Ref Range Status   05/12/2023 14.7 6.0 - 20.0 mg/dL Final     AST   Date Value Ref Range Status   05/12/2023 22 10 - 35 U/L Final     Albumin   Date Value Ref Range Status   05/12/2023 4.1 3.5 - 5.2 g/dL Final   01/02/2019 3.8 3.5 - 5.0  g/dL Final   02/06/2018 3.8 3.5 - 5.0 g/dL Final     Alkaline Phosphatase   Date Value Ref Range Status   05/12/2023 99 35 - 104 U/L Final     ALT   Date Value Ref Range Status   05/12/2023 31 10 - 35 U/L Final   01/02/2019 18 0 - 45 U/L Final   02/06/2018 18 0 - 45 U/L Final          Immunization History     Immunization History   Administered Date(s) Administered    COVID-19 MONOVALENT 12+ (Pfizer) 12/21/2020, 01/06/2021, 10/08/2021    Flu, Unspecified 09/30/2010, 09/28/2011, 09/26/2012, 10/10/2013, 10/30/2017, 10/10/2018    Hepatitis B, Adult 04/26/2019    Influenza Vaccine >6 months (Alfuria,Fluzone) 10/14/2019, 09/21/2021    Td (Adult), Adsorbed 04/26/2019    Td,adult,historic,unspecified 11/04/2002

## 2023-09-14 NOTE — H&P (VIEW-ONLY)
This document was created using a software with less than 100% fidelity, at times resulting in unintended, even erroneous syntax and grammar.  The reader is advised to keep this under consideration while reviewing, interpreting this note.      Rheumatology Consult Note      Mariluz Duron     YOB: 1975 Age: 48 year old    Date of visit: 9/14/23    PCP: Mónica Vera    Chief Complaint   Patient presents with:  Consult      Assessment and Plan     Mariluz was seen today for consult.    Diagnoses and all orders for this visit:    Polyarthralgia  -     XR Pelvis and Hip Bilateral 2 Views; Future  -     XR Hand Bilateral G/E 3 Views; Future  -     XR Foot Bilateral G/E 3 Views; Future  -     Erythrocyte sedimentation rate auto; Future  -     Cyclic Citrullinated Peptide Antibody IgG; Future  -     CRP inflammation; Future  -     Uric acid; Future  -     Rheumatoid factor; Future  -     DULoxetine (CYMBALTA) 30 MG capsule; Take 1 capsule (30 mg) by mouth daily for 30 days    Primary osteoarthritis involving multiple joints  -     XR Pelvis and Hip Bilateral 2 Views; Future  -     XR Hand Bilateral G/E 3 Views; Future  -     XR Foot Bilateral G/E 3 Views; Future  -     DULoxetine (CYMBALTA) 30 MG capsule; Take 1 capsule (30 mg) by mouth daily for 30 days    Hypermobility arthralgia  -     XR Foot Bilateral G/E 3 Views; Future           This patient presents with further worsening of polyarthralgias.  She has osteoarthritis.  This is relatively premature in the context of her hypermobility syndrome which she has fewer findings than she had 10 years ago in part due to painful joints.  We will start duloxetine major side effects outlined.  While the likelihood of inflammatory joint disease or connective tissue disease appears to be low nonetheless if will be important to check labs and x-rays as noted.  X-rays of the hands taken today, personally reviewed the films, findings: She has extensive  "degenerative changes loss of joint space sclerosis and osteophyte formation in the DIPs especially of the right hand digits, without erosions such as in the metacarpophalangeal joints, pencil in cup morphology or chondrocalcinosis.  We will meet here in 2 months.    Return to clinic: 2 months      HPI   Mariluz Duron is a 48 year old female  is seen today for evaluation of polyarthralgias.  These have troubled her for the past more than 10 years.  She was first seen in 2014.  She had ample evidence of osteoarthritis even at that stage in the background of hypermobility, having known herself to have \"double jointed nests\" since childhood.  At no point she or her family members have had  psoriasis.  Her original symptoms seem to have started around 2012 2013 timeframe when she started experiencing pain in her DIPs especially in the left hand index and then the right side corresponding number.  There was associated deformity.  Even at that time she had painful Heberden's..  At 1 point there was some suggestion that she might have inflammatory joint disease hydroxychloroquine was started she had some improvement.  In 2019 she was seen here for the last time till today.  The reason she was not able to follow-up was health issues for now 22-year-old son who had had some traumatic experiences such as suicide of his best friend and discovering the body of his dad.  Now he is feeling better and she wants to go back to managing her own health.  In the interim she has developed diabetes.  She had rotator cuff impingement on the left shoulder and had a corticosteroid injection as well as ACL tear and repair of the left knee.      She reports that the joint pains continue to trouble her on daily basis the worst of the symptoms are in the hands, hips, shoulders and feet.  She points to the DIPs of both hands especially index and middle, the first CMC areas of both sides, left shoulder more painful than the right, she has had " "painful hips it feels as if especially the left hip gets \"out of socket, it \"clicks\".  She has had pain level as moderately severe intensity all these joint areas can be stiff for up to 2 hours in the morning.  In the interim she has been taking acetaminophen, ibuprofen.  Which provide partial relief.  On the left foot she has felt a lump in the midfoot area which is painful on the right foot is the first MTP and surrounding toes.               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 She is here for evaluation of joint pains,  especially the index finger DIPs. She recalls that she as a child was  thought to be \"double jointed.\" She could move her DIP in somewhat an  unusual fashion and sometimes \"lock those.\" Over the years, she did not  notice any significant abnormality. About 12 months ago, she began to notice  that her left hand DIP more so than the right index finger DIP had beginning  to become abnormal. There was pain. There was some redness and deformity  developing which has gone on during the subsequent 12 months. She describes  that this is worse with activity. The more she uses the hands, the worse it  gets and she, being an ER tech, ends up using her hands quite a lot of moving  patients, doing EKGs, typing data and other activities. She has noted some  discomfort in her feet as well, especially the 1st MTPs bilaterally. The  worst is when she is up and about. She has taken Aleve with some benefit.   She is not \"too bad\" when she wakes up. She works 3rd shift. She has noted  mild discomfort in the mid thoracic area, which is worse with activity. She  has no features to suggest spondylitis. She has not had features of iritis.   More recently, she had tenderness in her right elbow. She has no family  history as far as she is aware of psoriasis or personal. Amongst " "her  13-point review of systems, she noted sometimes heartburn, impaired sleep,  she works at 11 p.m. to 7:30 a.m. for the past 15 years. The rest of the  13-point ROS is negative. She rated her pain as moderately severe,  interfering with some of the day-to-day activities as noted MDHAQ and scanned  in the chart.      FAMILY HISTORY: Negative for psoriasis, colitis, rheumatoid arthritis. She  does not think that hands are similar to hers.      On physical examination, she is a well-appearing, comfortable female. Vital  data as recorded.  Eyes are without inflammation. Examination of neck is without  lymphadenopathy. On skin examination, she has her nails painted. There are  no psoriatic lesions, malar area eruption.  Examination of the joints reveals that she has tenderness and swelling at the  DIPs of both the index finger, worse on the left side with associated  deformity. In addition, she is tender in her 5th digit DIP as well as the  1st MTPs bilaterally. She has no other joint areas affected with tenderness,  swelling, or impaired range of motion. She does not have tenderness across  the trapezius or along the spine. She is alert and oriented. Speech is  fluent. Tone and power in upper and lower extremities symmetrically normal.   Cardiopulmonary examination within normal range.      ASSESSMENT AND PLAN: This 38-year-old patient presents with joint pains.   She has ample evidence of a degenerative arthropathy affecting the distal  interphalangeal of index finger bilaterally, and the beginning of the same  process in the 5th digit distal interphalangeal. She likely has first  metatarsophalangeal involvement. Today, we talked about the differential and  the cause of such a premature involvement at the age of 38. Her concern that  this is rheumatoid arthritis was reassured that this is not rheumatoid  arthritis. She does not have any history of psoriasis herself or the family.  Whether being \"double " "jointed/hypermobile\" having of hypermobile joints as a  child, she could put her distal interphalangeals at risk from that given what  she described noted earlier on. Given the amount of inflammation that she  has in the distal interphalangeals of the index fingers, it will be  reasonable for her to consider some intervention. Various were discussed.   She wants to proceed with hydroxychloroquine, starting her on 200 mg twice  daily. I have asked her to have x-rays of the feet taken. Recent labs  including negative rheumatoid factor, RAHEEM were reviewed as well as CRP and  sed rate. I have asked her to come back in 3 months.    Active Problem List     Patient Active Problem List   Diagnosis    Herpes Simplex Acute Gingivostomatitis    Joint Pain Fingers    Polyarthritis Of The Hand    Arthralgias In Multiple Sites    Generalized Osteoarthritis Of The Hand    High risk medication use    History of gestational diabetes    Gastroesophageal reflux disease without esophagitis    Class 2 severe obesity due to excess calories with serious comorbidity and body mass index (BMI) of 37.0 to 37.9 in adult (H)    Avitaminosis D    Hypermobility arthralgia    Routine general medical examination at a [facility] facility    Dysuria    Type 2 diabetes mellitus without complication, without long-term current use of insulin (H)     Past Medical History     Past Medical History:   Diagnosis Date    Arthritis 5+ years ago    Diabetes (H) 5-12-23    Elevated blood pressure reading in office without diagnosis of hypertension 05/12/2023    Hypertension 5-11-23     Past Surgical History     Past Surgical History:   Procedure Laterality Date    ABDOMEN SURGERY  ?    I had an ovarian cyst removed and i also did IVF    GENITOURINARY SURGERY  2009?    Ovarian cyst removed    LASER ABLATION      OVARIAN CYST REMOVAL       Allergy   No Known Allergies  Current Medication List     Current Outpatient Medications   Medication Sig    aspirin 81 MG EC " tablet Take 1 tablet (81 mg) by mouth daily    blood glucose (NO BRAND SPECIFIED) lancets standard Use to test blood sugar three times daily or as directed.    blood glucose (NO BRAND SPECIFIED) test strip Use to test blood sugar three times daily or as directed.    blood glucose monitoring (NO BRAND SPECIFIED) meter device kit Use to test blood sugar 2 times daily or as directed.    Continuous Blood Gluc Sensor (FREESTYLE HELEN 2 SENSOR) MISC Change every 14 days.    metFORMIN (GLUCOPHAGE) 500 MG tablet Take 1 tablet (500 mg) by mouth 2 times daily (with meals)    Semaglutide-Weight Management (WEGOVY) 2.4 MG/0.75ML pen Inject 2.4 mg Subcutaneous once a week    simvastatin (ZOCOR) 40 MG tablet Take 1 tablet (40 mg) by mouth At Bedtime    valACYclovir (VALTREX) 1000 mg tablet [VALACYCLOVIR (VALTREX) 1000 MG TABLET] TAKE 2 TABLETS BY MOUTH 1 TIME. REPEAT WITH 2 TABLETS IN 12 HOURS     No current facility-administered medications for this visit.            Family History     Family History   Problem Relation Age of Onset    Diabetes Mother     Osteoporosis Mother     Sleep Apnea Mother     Coronary Artery Disease Mother     Anxiety Disorder Mother     Obesity Mother     Hyperlipidemia Father     Hypertension Father     Obesity Father     Obesity Brother     Heart Disease Paternal Grandmother     No Known Problems Daughter     No Known Problems Son     Breast Cancer No family hx of     Colon Cancer No family hx of     Prostate Cancer No family hx of     Ovarian Cancer No family hx of     Skin Cancer No family hx of          Physical Exam     COMPREHENSIVE EXAMINATION:  Vitals:    09/14/23 1057   BP: 130/82   Pulse: 74     A well appearing alert oriented female. Vital data as noted above. Her eyes examined for inflammation/scleromalacia. ENT examined for oral mucositis, moisture, thrush, nasal deformity, external ear redness, deformity. Her neck is examined for suppleness and lymphadenopathy.  Cardiopulmonary  examination without dyspnea at rest, use of accessory muscles of breathing, wheezing, edema, peripheral or central cyanosis.  Abdomen examined for softness, tenderness, obvious organomegaly.  Skin examined for heliotrope, malar area eruption, lupus pernio, periungual erythema, sclerodactyly, papules, erythema nodosum, purpura, nail pitting, onycholysis, and obvious psoriasis lesion. Neurological examination done for alertness, speech, facial symmetry,  tone and power in upper and lower extremities, and gait. The joint examination is performed for swelling, tenderness, warmth, erythema, and range of motion in the following joints: DIPs, PIPs, MCPs, wrists, first CMC's, elbows, shoulders, hips, knees, ankles, feet; spine for range of motion and paraspinal muscles for tenderness. The salient normal / abnormal findings are appended.  She has markedly tender Heberden nodes, she has squaring of the first CMC where she has crepitus and tenderness and minimal grinding.  She has painful range of motion at the hip joints which otherwise complete, in the background of her hypermobility, she has midfoot osteophyte formation on the left side the right first MTP and second and third MTP were tender.  There is no dactylitis of digits or toes or enthesopathy.    Labs / Imaging (select studies)     No results found for: PPDINDURATIO, PPDREDNESS, TBGOLT, RHF, CCPABG, URIC, [id number], [id number], ANTIDNA, ANTIDNAINT, CARIA, [id number], [id number], ENASM, ENASCL, JO1, ENASSA, ENASSB, CENTA, M2YOBYL, U1AAEYT, [id number]   Hemoglobin   Date Value Ref Range Status   05/12/2023 15.2 11.7 - 15.7 g/dL Final   01/02/2019 14.6 12.0 - 16.0 g/dL Final   02/06/2018 14.1 12.0 - 16.0 g/dL Final     Urea Nitrogen   Date Value Ref Range Status   05/12/2023 14.7 6.0 - 20.0 mg/dL Final     AST   Date Value Ref Range Status   05/12/2023 22 10 - 35 U/L Final     Albumin   Date Value Ref Range Status   05/12/2023 4.1 3.5 - 5.2 g/dL Final   01/02/2019 3.8 3.5 - 5.0  g/dL Final   02/06/2018 3.8 3.5 - 5.0 g/dL Final     Alkaline Phosphatase   Date Value Ref Range Status   05/12/2023 99 35 - 104 U/L Final     ALT   Date Value Ref Range Status   05/12/2023 31 10 - 35 U/L Final   01/02/2019 18 0 - 45 U/L Final   02/06/2018 18 0 - 45 U/L Final          Immunization History     Immunization History   Administered Date(s) Administered    COVID-19 MONOVALENT 12+ (Pfizer) 12/21/2020, 01/06/2021, 10/08/2021    Flu, Unspecified 09/30/2010, 09/28/2011, 09/26/2012, 10/10/2013, 10/30/2017, 10/10/2018    Hepatitis B, Adult 04/26/2019    Influenza Vaccine >6 months (Alfuria,Fluzone) 10/14/2019, 09/21/2021    Td (Adult), Adsorbed 04/26/2019    Td,adult,historic,unspecified 11/04/2002

## 2023-09-15 LAB — RHEUMATOID FACT SER NEPH-ACNC: <6 IU/ML

## 2023-09-16 LAB — CCP AB SER IA-ACNC: 1.1 U/ML

## 2023-09-26 DIAGNOSIS — Z12.11 ENCOUNTER FOR SCREENING COLONOSCOPY: Primary | ICD-10-CM

## 2023-09-26 RX ORDER — BISACODYL 5 MG/1
TABLET, DELAYED RELEASE ORAL
Qty: 4 TABLET | Refills: 0 | Status: SHIPPED | OUTPATIENT
Start: 2023-09-26 | End: 2023-10-12

## 2023-10-10 ENCOUNTER — ANESTHESIA EVENT (OUTPATIENT)
Dept: SURGERY | Facility: AMBULATORY SURGERY CENTER | Age: 48
End: 2023-10-10
Payer: COMMERCIAL

## 2023-10-12 ENCOUNTER — HOSPITAL ENCOUNTER (OUTPATIENT)
Facility: AMBULATORY SURGERY CENTER | Age: 48
Discharge: HOME | End: 2023-10-12
Attending: FAMILY MEDICINE
Payer: COMMERCIAL

## 2023-10-12 ENCOUNTER — ANESTHESIA (OUTPATIENT)
Dept: SURGERY | Facility: AMBULATORY SURGERY CENTER | Age: 48
End: 2023-10-12
Payer: COMMERCIAL

## 2023-10-12 VITALS
OXYGEN SATURATION: 99 % | RESPIRATION RATE: 16 BRPM | DIASTOLIC BLOOD PRESSURE: 69 MMHG | HEIGHT: 64 IN | SYSTOLIC BLOOD PRESSURE: 122 MMHG | HEART RATE: 70 BPM | WEIGHT: 204 LBS | TEMPERATURE: 97.4 F | BODY MASS INDEX: 34.83 KG/M2

## 2023-10-12 DIAGNOSIS — Z12.11 COLON CANCER SCREENING: ICD-10-CM

## 2023-10-12 LAB
COLONOSCOPY: NORMAL
GLUCOSE POCT: 111 MG/DL (ref 70–99)

## 2023-10-12 PROCEDURE — 45380 COLONOSCOPY AND BIOPSY: CPT | Performed by: FAMILY MEDICINE

## 2023-10-12 RX ORDER — PROPOFOL 10 MG/ML
INJECTION, EMULSION INTRAVENOUS CONTINUOUS PRN
Status: DISCONTINUED | OUTPATIENT
Start: 2023-10-12 | End: 2023-10-12

## 2023-10-12 RX ORDER — OXYCODONE HYDROCHLORIDE 5 MG/1
5 TABLET ORAL
Status: DISCONTINUED | OUTPATIENT
Start: 2023-10-12 | End: 2023-10-13 | Stop reason: HOSPADM

## 2023-10-12 RX ORDER — SODIUM CHLORIDE, SODIUM LACTATE, POTASSIUM CHLORIDE, CALCIUM CHLORIDE 600; 310; 30; 20 MG/100ML; MG/100ML; MG/100ML; MG/100ML
INJECTION, SOLUTION INTRAVENOUS CONTINUOUS
Status: DISCONTINUED | OUTPATIENT
Start: 2023-10-12 | End: 2023-10-13 | Stop reason: HOSPADM

## 2023-10-12 RX ORDER — ONDANSETRON 2 MG/ML
INJECTION INTRAMUSCULAR; INTRAVENOUS PRN
Status: DISCONTINUED | OUTPATIENT
Start: 2023-10-12 | End: 2023-10-12

## 2023-10-12 RX ORDER — LIDOCAINE 40 MG/G
CREAM TOPICAL
Status: DISCONTINUED | OUTPATIENT
Start: 2023-10-12 | End: 2023-10-13 | Stop reason: HOSPADM

## 2023-10-12 RX ORDER — ONDANSETRON 2 MG/ML
4 INJECTION INTRAMUSCULAR; INTRAVENOUS EVERY 30 MIN PRN
Status: DISCONTINUED | OUTPATIENT
Start: 2023-10-12 | End: 2023-10-13 | Stop reason: HOSPADM

## 2023-10-12 RX ORDER — OXYCODONE HYDROCHLORIDE 10 MG/1
10 TABLET ORAL
Status: DISCONTINUED | OUTPATIENT
Start: 2023-10-12 | End: 2023-10-13 | Stop reason: HOSPADM

## 2023-10-12 RX ORDER — PROPOFOL 10 MG/ML
INJECTION, EMULSION INTRAVENOUS PRN
Status: DISCONTINUED | OUTPATIENT
Start: 2023-10-12 | End: 2023-10-12

## 2023-10-12 RX ORDER — ONDANSETRON 4 MG/1
4 TABLET, ORALLY DISINTEGRATING ORAL EVERY 30 MIN PRN
Status: DISCONTINUED | OUTPATIENT
Start: 2023-10-12 | End: 2023-10-13 | Stop reason: HOSPADM

## 2023-10-12 RX ADMIN — PROPOFOL 30 MG: 10 INJECTION, EMULSION INTRAVENOUS at 08:10

## 2023-10-12 RX ADMIN — ONDANSETRON 4 MG: 2 INJECTION INTRAMUSCULAR; INTRAVENOUS at 08:16

## 2023-10-12 RX ADMIN — PROPOFOL 20 MG: 10 INJECTION, EMULSION INTRAVENOUS at 08:14

## 2023-10-12 RX ADMIN — SODIUM CHLORIDE, SODIUM LACTATE, POTASSIUM CHLORIDE, CALCIUM CHLORIDE: 600; 310; 30; 20 INJECTION, SOLUTION INTRAVENOUS at 07:23

## 2023-10-12 RX ADMIN — PROPOFOL 140 MCG/KG/MIN: 10 INJECTION, EMULSION INTRAVENOUS at 08:09

## 2023-10-12 ASSESSMENT — LIFESTYLE VARIABLES: TOBACCO_USE: 1

## 2023-10-12 NOTE — ANESTHESIA POSTPROCEDURE EVALUATION
Patient: Mariluz Duron    Procedure: Procedure(s):  COLONOSCOPY WITH POLYPECTOMY X1       Anesthesia Type:  MAC    Note:  Disposition: Outpatient   Postop Pain Control: Uneventful            Sign Out: Well controlled pain   PONV: No   Neuro/Psych: Uneventful            Sign Out: Acceptable/Baseline neuro status   Airway/Respiratory: Uneventful            Sign Out: Acceptable/Baseline resp. status   CV/Hemodynamics: Uneventful            Sign Out: Acceptable CV status; No obvious hypovolemia; No obvious fluid overload   Other NRE: NONE   DID A NON-ROUTINE EVENT OCCUR? No           Last vitals:  Vitals Value Taken Time   /70 10/12/23 0900   Temp 97.4  F (36.3  C) 10/12/23 0841   Pulse 75 10/12/23 0908   Resp 16 10/12/23 0841   SpO2 99 % 10/12/23 0908   Vitals shown include unfiled device data.    Electronically Signed By: Shanika Morley MD  October 12, 2023  9:48 AM

## 2023-10-12 NOTE — ANESTHESIA PREPROCEDURE EVALUATION
Anesthesia Pre-Procedure Evaluation    Patient: Mariluz Duron   MRN: [id number] : 1975        Procedure : Procedure(s):  COLONOSCOPY          Past Medical History:   Diagnosis Date     Arthritis 5+ years ago     Diabetes (H) 23     Elevated blood pressure reading in office without diagnosis of hypertension 2023     Gastroesophageal reflux disease      Hypertension 23      Past Surgical History:   Procedure Laterality Date     ABDOMEN SURGERY  ?    I had an ovarian cyst removed and i also did IVF     ARTHROSCOPIC REPAIR ACL      2016     GENITOURINARY SURGERY  ?    Ovarian cyst removed     LASER ABLATION       OVARIAN CYST REMOVAL        No Known Allergies   Social History     Tobacco Use     Smoking status: Former     Packs/day: 1.50     Years: 10.00     Additional pack years: 0.00     Total pack years: 15.00     Types: Cigarettes     Start date: 1991     Quit date: 2009     Years since quittin.4     Passive exposure: Past     Smokeless tobacco: Never   Substance Use Topics     Alcohol use: Yes     Comment: Alcoholic Drinks/day: occasionally      Wt Readings from Last 1 Encounters:   23 92.5 kg (203 lb 14.4 oz)        Anesthesia Evaluation   Pt has had prior anesthetic.     No history of anesthetic complications       ROS/MED HX  ENT/Pulmonary:     (+)                tobacco use, Past use,                      Neurologic:  - neg neurologic ROS     Cardiovascular:     (+)  hypertension- -   -  - -                                      METS/Exercise Tolerance: >4 METS    Hematologic:  - neg hematologic  ROS     Musculoskeletal:  - neg musculoskeletal ROS     GI/Hepatic:     (+) GERD, Asymptomatic on medication,                  Renal/Genitourinary:  - neg Renal ROS     Endo:     (+)  type II DM,   Not using insulin,          Obesity (BMI 35),       Psychiatric/Substance Use:       Infectious Disease:       Malignancy:       Other:            Physical Exam    Airway       "  Mallampati: II   TM distance: > 3 FB   Neck ROM: full   Mouth opening: > 3 cm    Respiratory Devices and Support         Dental     Comment: good        Cardiovascular   cardiovascular exam normal          Pulmonary   pulmonary exam normal            OUTSIDE LABS:  CBC:   Lab Results   Component Value Date    WBC 7.9 05/12/2023    WBC 7.0 01/02/2019    HGB 15.2 05/12/2023    HGB 14.6 01/02/2019    HCT 43.8 05/12/2023    HCT 43.1 01/02/2019     05/12/2023     01/02/2019     BMP:   Lab Results   Component Value Date     05/12/2023    POTASSIUM 4.5 05/12/2023    CHLORIDE 98 05/12/2023    CO2 25 05/12/2023    BUN 14.7 05/12/2023    CR 0.63 05/12/2023    CR 0.73 01/02/2019     (H) 05/12/2023     COAGS: No results found for: \"PTT\", \"INR\", \"FIBR\"  POC: No results found for: \"BGM\", \"HCG\", \"HCGS\"  HEPATIC:   Lab Results   Component Value Date    ALBUMIN 4.1 05/12/2023    PROTTOTAL 7.3 05/12/2023    ALT 31 05/12/2023    AST 22 05/12/2023    ALKPHOS 99 05/12/2023    BILITOTAL 0.3 05/12/2023     OTHER:   Lab Results   Component Value Date    A1C 5.8 (H) 09/14/2023    ROSA MARIA 9.8 05/12/2023    TSH 1.49 05/12/2023    SED 20 09/14/2023       Anesthesia Plan    ASA Status:  2    NPO Status:  NPO Appropriate    Anesthesia Type: MAC.              Consents    Anesthesia Plan(s) and associated risks, benefits, and realistic alternatives discussed. Questions answered and patient/representative(s) expressed understanding.     - Discussed: Risks, Benefits and Alternatives for BOTH SEDATION and the PROCEDURE were discussed     - Discussed with:  Patient      - Extended Intubation/Ventilatory Support Discussed: No.      - Patient is DNR/DNI Status: No     Use of blood products discussed: Yes.     - Discussed with: Patient.     - Consented: consented to blood products            Reason for refusal: other.     Postoperative Care    Pain management: Multi-modal analgesia.   PONV prophylaxis: Ondansetron (or other " 5HT-3)     Comments:    Other Comments: Propofol sedation          Diego Salcido MD

## 2023-10-12 NOTE — DISCHARGE INSTRUCTIONS
Colonoscopy Discharge Instructions  When You Leave Today:  The medications you received today may affect your short-term memory, balance/coordination, and reflexes. It may cause drowsiness, slow reflexes, and impaired thoughts. We recommend that you go home and rest for the remainder of the day. Do NOT DRIVE, go back to work or do anything that requires a clear thought process. Examples include but are not limited to: Do NOT drive, operate any machines, make important personal or work decisions, or sign legal documentation for 24 hours.      You may feel bloated because of the air that was introduced during the exam. Flatulence and belching is expected. If you feel like the air isn't coming out easily you can try laying on your right side to help the air move in the correct direction. Holding in the air can cause abdominal pain, cramping, and in some cases nausea. Let the gas pass!!    You may eat and drink what you can tolerate after your exam.  We recommend that you start with something light and progress as tolerated. You will be gassy, so avoiding things that create more gas is beneficial. This includes things like beans, carbonated beverages, and whole vegetables like broccoli/brussel sprouts/cabbage.     AVOID alcohol for 24 hours. This could have an adverse reaction mixed with the sedative.       You may resume your normal home medications unless told otherwise. Do not double up on any missed doses.        Avoid Aspirin and Aspirin containing products for 3 days if biopsies were taken. This will allow the areas to heal and will decrease your risk of bleeding.      Watch the area where your IV was inserted. If there is any swelling, severe pain or the area feels hot, place a warm, wet cloth over the area for 10-20 minutes. A small bruise is normal. If you continue to have discomfort, contact your doctor.      If you use a CPAP at home, please use it for the next 24-48 hours.    If any of the following  occur, please go to your CLOSEST EMERGENCY ROOM.    Increasing abdominal pain (if you are unable to pass gas or doubled over in pain)  Fever of 101.5 degrees or higher  Bleeding (a small amount of bleeding is normal when wiping if you have hemorrhoids or we remove tissue samples. However if you are filling the toilet with bright red blood, this is an emergency)    Call the [facility] [phone] if you have any questions or concerns regarding your procedure.  The [facility] is open from 8am to 5pm Monday through Friday.  DO not come to the clinic for severe post procedure complications, go to your closest ER.     Colonoscopy:    X Biopsies Taken    No Polyps- Follow up in 10 years (unless condition changes or treatment guidelines change)    Diverticulosis   X Hemorrhoids X Internal  External   X High Fiber Diet           Other _______________________________________________________________    If biopsies were taken:  A letter will be mailed to you with your biopsy results and further recommendations in approximately   1-2 weeks.  If you have not received a letter within 3 weeks, please call the [facility] [phone].

## 2023-10-12 NOTE — LETTER
10/19/23      Mariluz Duron  [address] [place] [address]    : 1975    Mariluz Duron,    The results from your colonoscopy showed one precancerous polyp. Based on these results, current guidelines recommend repeat exam in 5-10 years for surveillance colonoscopy (in the years 0565-1312).    Thank you coming to see me for your colonoscopy. Please let me know if there is any further information that you need.    Dr. Elan Ansari III, MD, FAAFP  Faculty Physician, [facility]    Department of Family Medicine and Community Health  [employer] [facility] [employer]    Office: [phone]

## 2023-10-12 NOTE — ANESTHESIA CARE TRANSFER NOTE
Patient: Mariluz Duron    Procedure: Procedure(s):  COLONOSCOPY WITH POLYPECTOMY X1       Diagnosis: Colon cancer screening [Z12.11]  Diagnosis Additional Information: No value filed.    Anesthesia Type:   MAC     Note:    Oropharynx: oropharynx clear of all foreign objects  Level of Consciousness: awake  Oxygen Supplementation: face mask    Independent Airway: airway patency satisfactory and stable  Dentition: dentition unchanged  Vital Signs Stable: post-procedure vital signs reviewed and stable    Patient transferred to: Phase II          Vitals:  Vitals Value Taken Time   /62 10/12/23 0841   Temp 97.4  F (36.3  C) 10/12/23 0841   Pulse     Resp 16 10/12/23 0841   SpO2 100 % 10/12/23 0841       Electronically Signed By: Diego Salcido MD  October 12, 2023  8:42 AM

## 2023-10-12 NOTE — INTERVAL H&P NOTE
I have reviewed the surgical (or preoperative) H&P that is linked to this encounter, and examined the patient. There are no significant changes.    Heart: RRR, no MRG  Lungs: CTAB    Willam Ansari III, MD, FAAFP  [facility]  10/12/23 7:55 AM

## 2023-10-13 NOTE — OP NOTE
COLONOSCOPY OPERATION REPORT     OPERATION PERFORMED:  Colonoscopy with MAC  DATE OF OPERATION: 13 October 2023  SURGEON(S): Willam Ansari III, MD, FAAFP    Preoperative Diagnosis: Colorectal Cancer Screen  (Z12.11)    Postoperative Diagnosis: Polyps of the Colon, internal hemorrhoids     History:    48yof here for initial screening colonoscopy. Denies symptoms. No family history of colon cancer.        Shortly Before Procedure  Time out was completed. Correct patient ID, procedure verified, positioning verified, implants/equipment available, studies available as needed. Consents signed and on the chart. Emergency resuscitation equipment available if needed. TYPE OF ANESTHESIA:  MAC. Patient monitored in the usual fashion with pulse ox, NIBP, and EKG. See flowsheet for full details.      DESCRIPTION OF OPERATION:  Assuring patient comfort, a rectal exam revealed normal sphincter tone, no masses, no stool in the rectal vault. The video colonoscope was passed from anus to cecum under direct visualization with expected amount of difficulty. The cecum was identified by the ileocecal valve, appendiceal orifice, and crows foot. Mucosa was inspected > 6 minute scope withdrawal.         Findings   Prep:    Excellent   EBL:                     < 5 mL   Terminal Ileum: Normal appearance   Cecum:                     Normal appearance.   Ascending Colon:    Normal appearance.       Transverse Colon:   Normal appearance.        Desc. Colon: Normal appearance.       Sigmoid Colon:        Normal appearance.        Rectum:                    Single polyp <5mm removed by cold bx forceps. Moderate internal hemorrhoids.   Complications: None.     Polyps submitted:                     1   Diverticulosis:    None   Internal Hemorrhoids: Moderate   External Hemorrhoids: None       TIMES time  min   PROCEDURE START 0809 TOTAL PROCEDURE TIME 23   CECUM REACHED 0816 TIME TO CECUM 7   PROCEDURE STOP 0832 WITHDRAWAL TIME 16        DISPOSITION   Follow up/Repeat Colonoscopy:                     Pending pathology review.           Willam Ansari III, MD, FAAFP

## 2023-11-09 NOTE — COMMUNITY RESOURCES LIST (ENGLISH)
11/09/2023   [facility] [employer]  N/A  For questions about this resource list or additional care needs, please contact your primary care clinic or care manager.  Phone: [phone]   Email: N/A   Address: [facility] [place] [address]   Hours: N/A        Financial Stability       Utility payment assistance  1  [employer] Distance: 2.21 miles      [address] [place] [address]  Language: English, Samoan  Hours: Mon 9:30 AM - 11:30 AM , Tue 1:30 PM - 7:30 PM , Thu 9:00 AM - 7:00 PM , Fri 9:30 AM - 11:30 AM   Phone: (124) 674-4002 Email: info@[facility] Website: http://[facility].org/     2  [place] [employer] [facility] [place]'s Telephone Assistance Plan (TAP) and [facility] Lifeline and Affordable Connectivity Program (ACP) Distance: 7.55 miles      Phone/Virtual   [address] [place] [address]  Language: English  Fees: Free   Phone: (535) 671-6734 Email: consumer.keli@[facility]. Website: https://[place]puc[place]consumers/telephone/          Important Numbers & Websites       Emergency Services   911  City Services   311  Poison Control   (956) 340-4146  Suicide Prevention Lifeline   (483) 487-1533 (TALK)  Child Abuse Hotline   (606) 870-4511 (4-A-Child)  Sexual Assault Hotline   (731) 170-4648 (HOPE)  National Runaway Safeline   (836) 349-1335 (RUNAWAY)  All-Options Talkline   (734) 896-4951  Substance Abuse Referral   (690) 707-4496 (HELP)

## 2023-11-14 ENCOUNTER — OFFICE VISIT (OUTPATIENT)
Dept: RHEUMATOLOGY | Facility: CLINIC | Age: 48
End: 2023-11-14
Payer: COMMERCIAL

## 2023-11-14 VITALS
BODY MASS INDEX: 34.45 KG/M2 | DIASTOLIC BLOOD PRESSURE: 84 MMHG | OXYGEN SATURATION: 99 % | HEART RATE: 76 BPM | WEIGHT: 200.7 LBS | SYSTOLIC BLOOD PRESSURE: 124 MMHG

## 2023-11-14 DIAGNOSIS — M25.50 POLYARTHRALGIA: Primary | ICD-10-CM

## 2023-11-14 DIAGNOSIS — M76.891 TENDINITIS OF RIGHT HIP FLEXOR: ICD-10-CM

## 2023-11-14 DIAGNOSIS — M15.0 PRIMARY OSTEOARTHRITIS INVOLVING MULTIPLE JOINTS: ICD-10-CM

## 2023-11-14 DIAGNOSIS — M25.50 HYPERMOBILITY ARTHRALGIA: ICD-10-CM

## 2023-11-14 PROCEDURE — 99214 OFFICE O/P EST MOD 30 MIN: CPT | Performed by: INTERNAL MEDICINE

## 2023-11-14 RX ORDER — DULOXETIN HYDROCHLORIDE 30 MG/1
30 CAPSULE, DELAYED RELEASE ORAL 2 TIMES DAILY
Qty: 60 CAPSULE | Refills: 1 | Status: SHIPPED | OUTPATIENT
Start: 2023-11-14 | End: 2024-05-22

## 2023-11-14 NOTE — PROGRESS NOTES
Rheumatology follow-up visit trish Mcgraw is a 48 year old female presents today for follow-up.    Diagnoses and all orders for this visit:    Polyarthralgia  -     DULoxetine (CYMBALTA) 30 MG capsule; Take 1 capsule (30 mg) by mouth 2 times daily for 30 days    Tendinitis of right hip flexor  -     Physical Therapy Referral; Future    Primary osteoarthritis involving multiple joints  -     DULoxetine (CYMBALTA) 30 MG capsule; Take 1 capsule (30 mg) by mouth 2 times daily for 30 days    Hypermobility arthralgia        This patient with polyarthralgia worse in the bases of her thumbs and the hips especially the right side has osteoarthritis with accompanying tendinopathy such as flexor tendon on the right side in the hip.  We discussed management principles.  She is going to increase duloxetine dose as noted.  Should this not provide any significant relief then gradually taper and discontinue.  Physical therapy for right hip flexor tendinopathy.  For the first CMC OA which is more symptomatic than other joints management principles were outlined.  I showed her the x-rays with the CMC #1 bilaterally has clear evidence of OA not noted in the radiology report.  We will meet here in 2 months.    Follow up in 2 months.    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  She has noted worsening pain.  This is in the bases of the thumbs, hip areas more so on the right side she points to the anterolateral aspect of the right hip, this interferes with sleeping as well as sometimes ambulation feels as if it is catching on something.  This is gone on for 6 months.  She has milder pain in the shoulder areas.  Recent blood work as well as the x-rays were reviewed.  These have troubled her for the past more than 10 years.  She was first seen in 2014.  She had ample evidence of osteoarthritis even at that stage in the background  "of hypermobility, having known herself to have \"double jointed nests\" since childhood.  At no point she or her family members have had  psoriasis.  Her original symptoms seem to have started around 2012 2013 timeframe when she started experiencing pain in her DIPs especially in the left hand index and then the right side corresponding number.  There was associated deformity.  Even at that time she had painful Heberden's..  At 1 point there was some suggestion that she might have inflammatory joint disease hydroxychloroquine was started she had some improvement.  In 2019 she was seen here for the last time till today.  The reason she was not able to follow-up was health issues for now 22-year-old son who had had some traumatic experiences such as suicide of his best friend and discovering the body of his dad.  Now he is feeling better and she wants to go back to managing her own health.  In the interim she has developed diabetes.  She had rotator cuff impingement on the left shoulder and had a corticosteroid injection as well as ACL tear and repair of the left knee.       DETAILED EXAMINATION  11/14/23  :    Vitals:    11/14/23 1220   BP: 124/84   Pulse: 76   SpO2: 99%   Weight: 91 kg (200 lb 11.2 oz)     Alert oriented. Head including the face is examined for malar rash, heliotropes, scarring, lupus pernio. Eyes examined for redness such as in episcleritis/scleritis, periorbital lesions.   Neck/ Face examined for parotid gland swelling, range of motion of neck.  Left upper and lower and right upper and lower extremities examined for tenderness, swelling, warmth of the appendicular joints, range of motion, edema, rash.  Some of the important findings included: she does not have evidence of synovitis in the palpable joints of the upper extremities.  She has marked tenderness of the right and the left first CMC with minimal grinding.  Finkelstein's negative.  She has evidence of flexor tendinopathy of the hip joints, " more so on the right side.  She does not have tenderness in the trochanteric areas.  She has full range of motion of the shoulders.  Patient Active Problem List    Diagnosis Date Noted    Routine general medical examination at a health care facility 05/12/2023     Priority: Medium    Dysuria 05/12/2023     Priority: Medium    Type 2 diabetes mellitus without complication, without long-term current use of insulin (H) 05/12/2023     Priority: Medium    Hypermobility arthralgia 02/06/2018     Priority: Medium    Herpes Simplex Acute Gingivostomatitis      Priority: Medium    Avitaminosis D 08/26/2016     Priority: Medium    History of gestational diabetes 08/25/2016     Priority: Medium    Gastroesophageal reflux disease without esophagitis 08/25/2016     Priority: Medium    Class 2 severe obesity due to excess calories with serious comorbidity and body mass index (BMI) of 37.0 to 37.9 in adult (H) 08/25/2016     Priority: Medium    High risk medication use 08/18/2016     Priority: Medium    Polyarthritis Of The Hand      Priority: Medium    Generalized Osteoarthritis Of The Hand      Priority: Medium    Arthralgias In Multiple Sites      Priority: Medium    Joint Pain Fingers      Priority: Medium     Past Surgical History:   Procedure Laterality Date    ABDOMEN SURGERY  ?    I had an ovarian cyst removed and i also did IVF    ARTHROSCOPIC REPAIR ACL      2016    COLONOSCOPY N/A 10/12/2023    Procedure: COLONOSCOPY WITH POLYPECTOMY X1;  Surgeon: Willam Ansari MD;  Location: [facility] OR    GENITOURINARY SURGERY  2009?    Ovarian cyst removed    LASER ABLATION      OVARIAN CYST REMOVAL        Past Medical History:   Diagnosis Date    Arthritis 5+ years ago    Diabetes (H) 5-12-23    Elevated blood pressure reading in office without diagnosis of hypertension 05/12/2023    Gastroesophageal reflux disease     Hypertension 5-11-23     No Known Allergies  Current Outpatient Medications   Medication Sig Dispense  Refill    aspirin 81 MG EC tablet Take 1 tablet (81 mg) by mouth daily      blood glucose (NO BRAND SPECIFIED) lancets standard Use to test blood sugar three times daily or as directed. 300 each 3    blood glucose (NO BRAND SPECIFIED) test strip Use to test blood sugar three times daily or as directed. 300 strip 3    blood glucose monitoring (NO BRAND SPECIFIED) meter device kit Use to test blood sugar 2 times daily or as directed. 1 kit 0    Continuous Blood Gluc Sensor (FREESTYLE HELEN 2 SENSOR) MISC Change every 14 days. 2 each 5    DULoxetine (CYMBALTA) 30 MG capsule Take 1 capsule (30 mg) by mouth daily for 30 days 30 capsule 2    metFORMIN (GLUCOPHAGE) 500 MG tablet Take 1 tablet (500 mg) by mouth 2 times daily (with meals) 360 tablet 1    Semaglutide-Weight Management (WEGOVY) 2.4 MG/0.75ML pen Inject 2.4 mg Subcutaneous once a week 3 mL 3    simvastatin (ZOCOR) 40 MG tablet Take 1 tablet (40 mg) by mouth At Bedtime 90 tablet 3    valACYclovir (VALTREX) 1000 mg tablet [VALACYCLOVIR (VALTREX) 1000 MG TABLET] TAKE 2 TABLETS BY MOUTH 1 TIME. REPEAT WITH 2 TABLETS IN 12 HOURS 30 tablet 1     family history includes Anxiety Disorder in her mother; Coronary Artery Disease in her mother; Diabetes in her mother; Heart Disease in her paternal grandmother; Hyperlipidemia in her father; Hypertension in her father; No Known Problems in her daughter and son; Obesity in her brother, father, and mother; Osteoporosis in her mother; Sleep Apnea in her mother.  Social Connections: Not on file          WBC Count   Date Value Ref Range Status   05/12/2023 7.9 4.0 - 11.0 10e3/uL Final     RBC Count   Date Value Ref Range Status   05/12/2023 5.04 3.80 - 5.20 10e6/uL Final     Hemoglobin   Date Value Ref Range Status   05/12/2023 15.2 11.7 - 15.7 g/dL Final     Hematocrit   Date Value Ref Range Status   05/12/2023 43.8 35.0 - 47.0 % Final     MCV   Date Value Ref Range Status   05/12/2023 87 78 - 100 fL Final     MCH   Date Value  Ref Range Status   05/12/2023 30.2 26.5 - 33.0 pg Final     Platelet Count   Date Value Ref Range Status   05/12/2023 256 150 - 450 10e3/uL Final     AST   Date Value Ref Range Status   05/12/2023 22 10 - 35 U/L Final     ALT   Date Value Ref Range Status   05/12/2023 31 10 - 35 U/L Final     Albumin   Date Value Ref Range Status   05/12/2023 4.1 3.5 - 5.2 g/dL Final   01/02/2019 3.8 3.5 - 5.0 g/dL Final     Alkaline Phosphatase   Date Value Ref Range Status   05/12/2023 99 35 - 104 U/L Final     Creatinine   Date Value Ref Range Status   05/12/2023 0.63 0.51 - 0.95 mg/dL Final     GFR Estimate   Date Value Ref Range Status   05/12/2023 >90 >60 mL/min/1.73m2 Final     Comment:     eGFR calculated using 2021 CKD-EPI equation.   01/02/2019 >60 >60 mL/min/1.73m2 Final     GFR Estimate If Black   Date Value Ref Range Status   01/02/2019 >60 >60 mL/min/1.73m2 Final     Creatinine Urine mg/dL   Date Value Ref Range Status   09/14/2023 238.0 mg/dL Final     Comment:     The reference ranges have not been established in urine creatinine. The results should be integrated into the clinical context for interpretation.     Erythrocyte Sedimentation Rate   Date Value Ref Range Status   09/14/2023 20 0 - 20 mm/hr Final

## 2023-11-16 ENCOUNTER — OFFICE VISIT (OUTPATIENT)
Dept: FAMILY MEDICINE | Facility: CLINIC | Age: 48
End: 2023-11-16
Payer: COMMERCIAL

## 2023-11-16 VITALS
DIASTOLIC BLOOD PRESSURE: 81 MMHG | HEIGHT: 64 IN | OXYGEN SATURATION: 98 % | HEART RATE: 76 BPM | TEMPERATURE: 99 F | SYSTOLIC BLOOD PRESSURE: 135 MMHG | RESPIRATION RATE: 16 BRPM | WEIGHT: 200.44 LBS | BODY MASS INDEX: 34.22 KG/M2

## 2023-11-16 DIAGNOSIS — E11.9 TYPE 2 DIABETES MELLITUS WITHOUT COMPLICATION, WITHOUT LONG-TERM CURRENT USE OF INSULIN (H): Primary | ICD-10-CM

## 2023-11-16 PROCEDURE — 99207 PR FOOT EXAM NO CHARGE: CPT

## 2023-11-16 PROCEDURE — 99213 OFFICE O/P EST LOW 20 MIN: CPT

## 2023-11-16 NOTE — ASSESSMENT & PLAN NOTE
Hemoglobin A1c has improved drastically with most recent measurement of 5.8 down from 10.8. Weight down 32lbs in 6 months. She is tolerating metformin and Wegovy well. She has not had lipids rechecked since starting simvastatin, but will plan to obtain fasting labs at the [facility] outpatient lab. Foot exam completed today in clinic. Eye exam up to date. Declines pneumococcal and COVID today. Would still consider low dose ACE/ARB, as her blood pressure is borderline today, but at this time will continue to trend. All questions were answered.

## 2023-11-16 NOTE — PROGRESS NOTES
Assessment & Plan   Problem List Items Addressed This Visit       Type 2 diabetes mellitus without complication, without long-term current use of insulin (H) - Primary     Hemoglobin A1c has improved drastically with most recent measurement of 5.8 down from 10.8. Weight down 32lbs in 6 months. She is tolerating metformin and Wegovy well. She has not had lipids rechecked since starting simvastatin, but will plan to obtain fasting labs at the [facility] outpatient lab. Foot exam completed today in clinic. Eye exam up to date. Declines pneumococcal and COVID today. Would still consider low dose ACE/ARB, as her blood pressure is borderline today, but at this time will continue to trend. All questions were answered.         Relevant Orders    FOOT EXAM (Completed)      SHERMAN Gonzalez CNP [facility]    Logan Mcgraw is a 48 year old, presenting for the following health issues:  Diabetes        11/16/2023     4:31 PM   Additional Questions   Roomed by SAC   Accompanied by SELF         11/16/2023     4:31 PM   Patient Reported Additional Medications   Patient reports taking the following new medications NO       Presents for diabetes check  Doing well. Tolerating all of her new medications well with no side effects.  Continues work with weight management  Denies any additional needs today    History of Present Illness       Diabetes:   She presents for follow up of diabetes.  She is checking home blood glucose four or more times daily.   She checks blood glucose after meals and at bedtime.  Blood glucose is never over 200 and sometimes under 70. She is aware of hypoglycemia symptoms including none.    She has no concerns regarding her diabetes at this time.   She is not experiencing numbness or burning in feet, excessive thirst, blurry vision, weight changes or redness, sores or blisters on feet.           Hyperlipidemia:  She presents for follow up of hyperlipidemia.   She is taking  "medication to lower cholesterol. She is not having myalgia or other side effects to statin medications.    She eats 2-3 servings of fruits and vegetables daily.She consumes 0 sweetened beverage(s) daily.She exercises with enough effort to increase her heart rate 30 to 60 minutes per day.  She exercises with enough effort to increase her heart rate 4 days per week. She is missing 1 dose(s) of medications per week.  She is not taking prescribed medications regularly due to remembering to take.     Review of Systems         Objective    /81 (BP Location: Left arm, Patient Position: Sitting, Cuff Size: Adult Large)   Pulse 76   Temp 99  F (37.2  C) (Oral)   Resp 16   Ht 1.626 m (5' 4\")   Wt 90.9 kg (200 lb 7 oz)   SpO2 98%   BMI 34.41 kg/m    Body mass index is 34.41 kg/m .    Physical Exam   GENERAL: healthy, alert and no distress  Diabetic foot exam: normal DP and PT pulses, no trophic changes or ulcerative lesions, and normal sensory exam                "

## 2023-11-16 NOTE — COMMUNITY RESOURCES LIST (ENGLISH)
11/16/2023   [facility] - Outpatient Clinics  N/A  For additional resource needs, please contact your health insurance member services or your primary care team.  Phone: [phone]   Email: N/A   Address: [facility] [place] [address]   Hours: N/A        Financial Stability       Utility payment assistance  1  [place] [employer] - [place]'s Telephone Assistance Plan (TAP) and Federal Lifeline and Affordable Connectivity Program (ACP) Distance: 7.55 miles      Phone/Virtual   [address] [place] [address]  Language: English  Fees: Free   Phone: (480) 110-7309 Email: consumer.puc@[facility].mn. Website: https://[place].gov/puc/consumers/telephone/     2  [place] [employer] - Energy and Utilities Distance: 14.09 miles      In-Person, Phone/Virtual   [address] [place] [address]  Language: English  Hours: Mon - Fri 8:30 AM - 4:30 PM  Fees: Free   Phone: (405) 871-9704 Website: [place]://[place]/energy/consumer-assistance/energy-assistance-program/          Important Numbers & Websites       [facility]   211 211itedway.org  Poison Control   (691) 778-4776 [place]poison.[place]  Suicide and Crisis Lifeline   [address] [place], [place] [address]line.org  Childhelp [place] Child Abuse Hotline   935.788.8672 Childhelphotline.org  National Sexual Assault Hotline   (402) 975-8689 (HOPE) Rainn.org  National Runaway Safeline   (730) 288-5175 (RUNAWAY) 1800runaway.org  Pregnancy & Postpartum Support [place]   Call/text 385-442-3373 Ppsupportmn.org  Substance Abuse National Helpline ([facility]   389-767-HELP (4857) Findtreatment.gov  Emergency Services   911     Bring BP machine to [place] BP check     Monitor daily blood pressures / keep a log / send 1-2 weeks of BP on Mychart       Insomnia  Insomnia is a sleep disorder that makes it difficult to fall asleep or stay asleep. Insomnia can cause fatigue, low energy, difficulty concentrating, mood swings, and poor performance at work or school.  There are three different ways to classify insomnia:  · Difficulty falling asleep.  · Difficulty staying asleep.  · Waking up too early in the morning.  Any type of insomnia can be long-term (chronic) or short-term (acute). Both are common. Short-term insomnia usually lasts for three months or less. Chronic insomnia occurs at least three times a week for longer than three months.  What are the causes?  Insomnia may be caused by another condition, situation, or substance, such as:  · Anxiety.  · Certain medicines.  · Gastroesophageal reflux disease (GERD) or other gastrointestinal conditions.  · Asthma or other breathing conditions.  · Restless legs syndrome, sleep apnea, or other sleep disorders.  · Chronic pain.  · Menopause.  · Stroke.  · Abuse of alcohol, tobacco, or illegal drugs.  · Mental health conditions, such as depression.  · Caffeine.  · Neurological disorders, such as Alzheimer's disease.  · An overactive thyroid (hyperthyroidism).  Sometimes, the cause of insomnia may not be known.  What increases the risk?  Risk factors for insomnia include:  · Gender. Women are affected more often than men.  · Age. Insomnia is more common as you get older.  · Stress.  · Lack of exercise.  · Irregular work schedule or working night shifts.  · Traveling between different time zones.  · Certain medical and mental health conditions.  What are the signs or symptoms?  If you have insomnia, the main symptom is having trouble falling asleep or having trouble staying asleep. This may lead to other symptoms, such as:  · Feeling fatigued or having low energy.  · Feeling nervous about going to  sleep.  · Not feeling rested in the morning.  · Having trouble concentrating.  · Feeling irritable, anxious, or depressed.  How is this diagnosed?  This condition may be diagnosed based on:  · Your symptoms and medical history. Your health care provider may ask about:  ? Your sleep habits.  ? Any medical conditions you have.  ? Your mental health.  · A physical exam.  How is this treated?  Treatment for insomnia depends on the cause. Treatment may focus on treating an underlying condition that is causing insomnia. Treatment may also include:  · Medicines to help you sleep.  · Counseling or therapy.  · Lifestyle adjustments to help you sleep better.  Follow these instructions at home:  Eating and drinking    · Limit or avoid alcohol, caffeinated beverages, and cigarettes, especially close to bedtime. These can disrupt your sleep.  · Do not eat a large meal or eat spicy foods right before bedtime. This can lead to digestive discomfort that can make it hard for you to sleep.  Sleep habits    · Keep a sleep diary to help you and your health care provider figure out what could be causing your insomnia. Write down:  ? When you sleep.  ? When you wake up during the night.  ? How well you sleep.  ? How rested you feel the next day.  ? Any side effects of medicines you are taking.  ? What you eat and drink.  · Make your bedroom a dark, comfortable place where it is easy to fall asleep.  ? Put up shades or blackout curtains to block light from outside.  ? Use a white noise machine to block noise.  ? Keep the temperature cool.  · Limit screen use before bedtime. This includes:  ? Watching TV.  ? Using your smartphone, tablet, or computer.  · Stick to a routine that includes going to bed and waking up at the same times every day and night. This can help you fall asleep faster. Consider making a quiet activity, such as reading, part of your nighttime routine.  · Try to avoid taking naps during the day so that you sleep better at  night.  · Get out of bed if you are still awake after 15 minutes of trying to sleep. Keep the lights down, but try reading or doing a quiet activity. When you feel sleepy, go back to bed.  General instructions  · Take over-the-counter and prescription medicines only as told by your health care provider.  · Exercise regularly, as told by your health care provider. Avoid exercise starting several hours before bedtime.  · Use relaxation techniques to manage stress. Ask your health care provider to suggest some techniques that may work well for you. These may include:  ? Breathing exercises.  ? Routines to release muscle tension.  ? Visualizing peaceful scenes.  · Make sure that you drive carefully. Avoid driving if you feel very sleepy.  · Keep all follow-up visits as told by your health care provider. This is important.  Contact a health care provider if:  · You are tired throughout the day.  · You have trouble in your daily routine due to sleepiness.  · You continue to have sleep problems, or your sleep problems get worse.  Get help right away if:  · You have serious thoughts about hurting yourself or someone else.  If you ever feel like you may hurt yourself or others, or have thoughts about taking your own life, get help right away. You can go to your nearest emergency department or call:  · Your local emergency services (911 in the [place]  · A suicide crisis helpline, such as the National Suicide Prevention Lifeline at 1-875.751.4033. This is open 24 hours a day.  Summary  · Insomnia is a sleep disorder that makes it difficult to fall asleep or stay asleep.  · Insomnia can be long-term (chronic) or short-term (acute).  · Treatment for insomnia depends on the cause. Treatment may focus on treating an underlying condition that is causing insomnia.  · Keep a sleep diary to help you and your health care provider figure out what could be causing your insomnia.  This information is not intended to replace advice given  to you by your health care provider. Make sure you discuss any questions you have with your health care provider.  Document Released: 12/15/2001 Document Revised: 11/30/2018 Document Reviewed: 09/27/2018  Elsevier Patient Education © 2020 Elsevier Inc.

## 2023-12-08 ENCOUNTER — MYC MEDICAL ADVICE (OUTPATIENT)
Dept: FAMILY MEDICINE | Facility: CLINIC | Age: 48
End: 2023-12-08
Payer: COMMERCIAL

## 2023-12-08 DIAGNOSIS — E11.9 TYPE 2 DIABETES MELLITUS WITHOUT COMPLICATION, WITHOUT LONG-TERM CURRENT USE OF INSULIN (H): ICD-10-CM

## 2023-12-08 NOTE — TELEPHONE ENCOUNTER
"Patient instructed to call clinic to schedule a weight loss follow up appointment.      Pended requested refill RX     7/28/23 OV noted: \" I will need to see her in 3 months as part of the approval process for ongoing use of semaglutide.\"      "

## 2023-12-18 ENCOUNTER — MYC MEDICAL ADVICE (OUTPATIENT)
Dept: FAMILY MEDICINE | Facility: CLINIC | Age: 48
End: 2023-12-18
Payer: COMMERCIAL

## 2023-12-18 DIAGNOSIS — E11.9 TYPE 2 DIABETES MELLITUS WITHOUT COMPLICATION, WITHOUT LONG-TERM CURRENT USE OF INSULIN (H): Primary | ICD-10-CM

## 2023-12-21 ENCOUNTER — TELEPHONE (OUTPATIENT)
Dept: FAMILY MEDICINE | Facility: CLINIC | Age: 48
End: 2023-12-21
Payer: COMMERCIAL

## 2023-12-21 NOTE — TELEPHONE ENCOUNTER
Prior Authorization Request  Who s requesting:  Pharmacy  Pharmacy Name and Location:   [id number]   Medication Name: (WEGOVY) 2.4 MG/0.75ML   Insurance Plan: [facility] CHOICE   Insurance Member ID Number:  [id number]   CoverMyMeds Key: NA  Informed patient that prior authorizations can take up to 10 business days for response:   NA  Okay to leave a detailed message: No     Route to pool:  [facility]

## 2023-12-24 ENCOUNTER — HEALTH MAINTENANCE LETTER (OUTPATIENT)
Age: 48
End: 2023-12-24

## 2023-12-28 NOTE — TELEPHONE ENCOUNTER
Prior Authorization Approval    Authorization Effective Date: 12/28/2023  Authorization Expiration Date: 12/28/2024  Medication: (WEGOVY) 2.4 MG/0.75ML   Reference #:     Insurance Company: "[employer] - Phone [phone] Fax [phone]  Which Pharmacy is filling the prescription (Not needed for infusion/clinic administered): [id number] - [place], [place] - [address] AT [facility] [address]  Pharmacy Notified: Yes  Patient Notified: Instructed pharmacy to notify patient when script is ready to /ship.

## 2023-12-28 NOTE — TELEPHONE ENCOUNTER
Central Prior Authorization Team   Phone: [phone]    PA Initiation    Medication: (WEGOVY) 2.4 MG/0.75ML   Insurance Company: [employer] - Phone [phone] Fax [phone]  Pharmacy Filling the Rx: [facility] #[id number] [place], [place] - [address] AT [facility] OF [address]  Filling Pharmacy Phone: [phone]  Filling Pharmacy Fax:    Start Date: 12/27/2023

## 2024-01-05 ENCOUNTER — OFFICE VISIT (OUTPATIENT)
Dept: FAMILY MEDICINE | Facility: CLINIC | Age: 49
End: 2024-01-05
Payer: COMMERCIAL

## 2024-01-05 VITALS
DIASTOLIC BLOOD PRESSURE: 90 MMHG | BODY MASS INDEX: 35.05 KG/M2 | SYSTOLIC BLOOD PRESSURE: 158 MMHG | TEMPERATURE: 98.1 F | HEIGHT: 64 IN | OXYGEN SATURATION: 98 % | WEIGHT: 205.3 LBS | RESPIRATION RATE: 16 BRPM | HEART RATE: 71 BPM

## 2024-01-05 DIAGNOSIS — E11.9 TYPE 2 DIABETES MELLITUS WITHOUT COMPLICATION, WITHOUT LONG-TERM CURRENT USE OF INSULIN (H): ICD-10-CM

## 2024-01-05 DIAGNOSIS — R03.0 ELEVATED BP WITHOUT DIAGNOSIS OF HYPERTENSION: ICD-10-CM

## 2024-01-05 DIAGNOSIS — E66.01 CLASS 2 SEVERE OBESITY DUE TO EXCESS CALORIES WITH SERIOUS COMORBIDITY AND BODY MASS INDEX (BMI) OF 35.0 TO 35.9 IN ADULT (H): Primary | ICD-10-CM

## 2024-01-05 DIAGNOSIS — E66.812 CLASS 2 SEVERE OBESITY DUE TO EXCESS CALORIES WITH SERIOUS COMORBIDITY AND BODY MASS INDEX (BMI) OF 35.0 TO 35.9 IN ADULT (H): Primary | ICD-10-CM

## 2024-01-05 DIAGNOSIS — E78.5 HYPERLIPIDEMIA LDL GOAL <70: ICD-10-CM

## 2024-01-05 LAB — HBA1C MFR BLD: 6 % (ref 0–5.6)

## 2024-01-05 PROCEDURE — 80061 LIPID PANEL: CPT | Performed by: FAMILY MEDICINE

## 2024-01-05 PROCEDURE — 36415 COLL VENOUS BLD VENIPUNCTURE: CPT | Performed by: FAMILY MEDICINE

## 2024-01-05 PROCEDURE — 83036 HEMOGLOBIN GLYCOSYLATED A1C: CPT | Performed by: FAMILY MEDICINE

## 2024-01-05 PROCEDURE — 99213 OFFICE O/P EST LOW 20 MIN: CPT | Performed by: FAMILY MEDICINE

## 2024-01-05 NOTE — ASSESSMENT & PLAN NOTE
48 year old year old female in clinic today to discuss treatment of the following conditions through diet and lifestyle modification and weight loss:  1. Class 2 severe obesity due to excess calories with serious comorbidity and body mass index (BMI) of 35.0 to 35.9 in adult (H)    2. Type 2 diabetes mellitus without complication, without long-term current use of insulin (H)    3. Hyperlipidemia LDL goal <70      The patient's weight loss result since the last visit was mixed based on small weight increase.  She is down 20+ pounds since starting a GLP-1 receptor agonist.  There was a question of coverage in the month of December.  She has been on 1 mg of Ozempic with decreased efficacy.  It appears that Wegovy was approved at her previous dose.  Encouraged her to restart that dose.  Blood pressure elevated and unexpected.  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  Just as a clinic visit with her rheumatologist.  She is focusing on improving her nutrition and increasing physical activity.  I am optimistic that she will continue with success.  I asked her to return to clinic in approximately 5 or 6 months.  I also would like to see her back in clinic in November just prior to the approval process for 2025 on her medication.

## 2024-01-05 NOTE — PROGRESS NOTES
"  Assessment & Plan   Problem List Items Addressed This Visit       Class 2 severe obesity due to excess calories with serious comorbidity and body mass index (BMI) of 35.0 to 35.9 in adult (H) - Primary     48 year old year old female in clinic today to discuss treatment of the following conditions through diet and lifestyle modification and weight loss:  1. Class 2 severe obesity due to excess calories with serious comorbidity and body mass index (BMI) of 35.0 to 35.9 in adult (H)    2. Type 2 diabetes mellitus without complication, without long-term current use of insulin (H)    3. Hyperlipidemia LDL goal <70    The patient's weight loss result since the last visit was mixed based on small weight increase.  She is down 20+ pounds since starting a GLP-1 receptor agonist.  There was a question of coverage in the month of December.  She has been on 1 mg of Ozempic with decreased efficacy.  It appears that Wegovy was approved at her previous dose.  Encouraged her to restart that dose.  Blood pressure elevated and unexpected.  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  Just as a clinic visit with her rheumatologist.  She is focusing on improving her nutrition and increasing physical activity.  I am optimistic that she will continue with success.  I asked her to return to clinic in approximately 5 or 6 months.  I also would like to see her back in clinic in November just prior to the approval process for 2025 on her medication.         Type 2 diabetes mellitus without complication, without long-term current use of insulin (H)     Other Visit Diagnoses       Hyperlipidemia LDL goal <70        Elevated BP without diagnosis of hypertension               BMI:   Estimated body mass index is 35.24 kg/m  as calculated from the following:    Height as of this encounter: 1.626 m (5' 4\").    Weight as of this encounter: 93.1 kg (205 lb 4.8 oz).   Weight management plan: Specific weight management program " "called comprehensive Weight management discussed    Len Melchor MD  [facility] YOJANA Mcgraw is a 48 year old, presenting for the following health issues:  Weight Loss (Follow-up/)        1/5/2024     1:40 PM   Additional Questions   Roomed by xl       Focusing on protein.  Still drinks diet soda every couple of days.  Trying to be physical active. Working to add water.    BP: not sure why it is high.         Review of Systems         Objective    BP (!) 158/90 (BP Location: Left arm, Patient Position: Sitting, Cuff Size: Adult Large)   Pulse 71   Temp 98.1  F (36.7  C) (Oral)   Resp 16   Ht 1.626 m (5' 4\")   Wt 93.1 kg (205 lb 4.8 oz)   LMP  (LMP Unknown)   SpO2 98%   BMI 35.24 kg/m    Body mass index is 35.24 kg/m .  Physical Exam  Nursing note reviewed.   Constitutional:       General: She is not in acute distress.     Appearance: Normal appearance. She is not ill-appearing.   HENT:      Head: Normocephalic and atraumatic.   Eyes:      Extraocular Movements: Extraocular movements intact.      Conjunctiva/sclera: Conjunctivae normal.   Pulmonary:      Effort: Pulmonary effort is normal.   Neurological:      Mental Status: She is alert and oriented to person, place, and time.   Psychiatric:         Attention and Perception: Attention normal.         Mood and Affect: Mood normal.         Speech: Speech normal.         Thought Content: Thought content normal.                   "

## 2024-01-06 LAB
CHOLEST SERPL-MCNC: 170 MG/DL
FASTING STATUS PATIENT QL REPORTED: NO
HDLC SERPL-MCNC: 55 MG/DL
LDLC SERPL CALC-MCNC: 83 MG/DL
NONHDLC SERPL-MCNC: 115 MG/DL
TRIGL SERPL-MCNC: 158 MG/DL

## 2024-01-12 ENCOUNTER — THERAPY VISIT (OUTPATIENT)
Dept: PHYSICAL THERAPY | Facility: REHABILITATION | Age: 49
End: 2024-01-12
Attending: INTERNAL MEDICINE
Payer: COMMERCIAL

## 2024-01-12 DIAGNOSIS — M76.891 TENDINITIS OF RIGHT HIP FLEXOR: ICD-10-CM

## 2024-01-12 PROCEDURE — 97110 THERAPEUTIC EXERCISES: CPT | Mod: GP

## 2024-01-12 PROCEDURE — 97161 PT EVAL LOW COMPLEX 20 MIN: CPT | Mod: GP

## 2024-01-12 ASSESSMENT — ACTIVITIES OF DAILY LIVING (ADL)
GETTING_INTO_AND_OUT_OF_A_BATHTUB: NO DIFFICULTY AT ALL
HOW_WOULD_YOU_RATE_YOUR_CURRENT_LEVEL_OF_FUNCTION_DURING_YOUR_USUAL_ACTIVITIES_OF_DAILY_LIVING_FROM_0_TO_100_WITH_100_BEING_YOUR_LEVEL_OF_FUNCTION_PRIOR_TO_YOUR_HIP_PROBLEM_AND_0_BEING_THE_INABILITY_TO_PERFORM_ANY_OF_YOUR_USUAL_DAILY_ACTIVITIES?: 75
GETTING INTO AND OUT OF AN AVERAGE CAR: SLIGHT DIFFICULTY
PUTTING_ON_SOCKS_AND_SHOES: MODERATE DIFFICULTY
STEPPING_UP_AND_DOWN_CURBS: MODERATE DIFFICULTY
ADL_TOTAL_ITEM_SCORE: 0
HOS_ADL_SCORE(%): 77.94
GETTING_INTO_AND_OUT_OF_AN_AVERAGE_CAR: SLIGHT DIFFICULTY
TWISTING/PIVOTING_ON_INVOLVED_LEG: MODERATE DIFFICULTY
ROLLING OVER IN BED: MODERATE DIFFICULTY
HEAVY_WORK: MODERATE DIFFICULTY
ROLLING_OVER_IN_BED: MODERATE DIFFICULTY
PLEASE_INDICATE_YOR_PRIMARY_REASON_FOR_REFERRAL_TO_THERAPY:: HIP
HOS_ADL_HIGHEST_POTENTIAL_SCORE: 68
WALKING_15_MINUTES_OR_GREATER: NO DIFFICULTY AT ALL
LIGHT_TO_MODERATE_WORK: NO DIFFICULTY AT ALL
HOW_WOULD_YOU_RATE_YOUR_CURRENT_LEVEL_OF_FUNCTION_DURING_YOUR_SPORTS_RELATED_ACTIVITIES_FROM_0_TO_100_WITH_100_BEING_YOUR_LEVEL_OF_FUNCTION_PRIOR_TO_YOUR_HIP_PROBLEM_AND_0_BEING_THE_INABILITY_TO_PERFORM_ANY_OF_YOUR_USUAL_DAILY_ACTIVITIES?: 75
LOW_IMPACT_ACTIVITIES_LIKE_FAST_WALKING: NO DIFFICULTY AT ALL
STEPPING UP AND DOWN CURBS: MODERATE DIFFICULTY
PUTTING ON SOCKS AND SHOES: MODERATE DIFFICULTY
RECREATIONAL ACTIVITIES: MODERATE DIFFICULTY
STARTING_AND_STOPPING_QUICKLY: SLIGHT DIFFICULTY
ABILITY_TO_PARTICIPATE_IN_YOUR_DESIRED_SPORT_AS_LONG_AS_YOU_WOULD_LIKE: SLIGHT DIFFICULTY
GOING DOWN 1 FLIGHT OF STAIRS: NO DIFFICULTY AT ALL
GETTING_INTO_AND_OUT_OF_A_BATHTUB: NO DIFFICULTY AT ALL
WALKING_DOWN_STEEP_HILLS: NO DIFFICULTY AT ALL
ADL_COUNT: 17
WALKING_INITIALLY: NO DIFFICULTY AT ALL
GOING_UP_1_FLIGHT_OF_STAIRS: MODERATE DIFFICULTY
STANDING_FOR_15_MINUTES: NO DIFFICULTY AT ALL
WALKING_UP_STEEP_HILLS: NO DIFFICULTY AT ALL
LANDING: NO DIFFICULTY AT ALL
CUTTING/LATERAL_MOVEMENTS: MODERATE DIFFICULTY
SITTING FOR 15 MINUTES: NO DIFFICULTY AT ALL
WALKING_UP_STEEP_HILLS: NO DIFFICULTY AT ALL
DEEP_SQUATTING: MODERATE DIFFICULTY
ADL_HIGHEST_POTENTIAL_SCORE: 68
HEAVY_WORK: MODERATE DIFFICULTY
SPORTS_TOTAL_ITEM_SCORE: 0
HOW_WOULD_YOU_RATE_YOUR_CURRENT_LEVEL_OF_FUNCTION?: NEARLY NORMAL
STANDING FOR 15 MINUTES: NO DIFFICULTY AT ALL
WALKING_15_MINUTES_OR_GREATER: NO DIFFICULTY AT ALL
RUNNING_ONE_MILE: MODERATE DIFFICULTY
HOS_ADL_ITEM_SCORE_TOTAL: 53
GOING UP 1 FLIGHT OF STAIRS: MODERATE DIFFICULTY
WALKING_INITIALLY: NO DIFFICULTY AT ALL
JUMPING: SLIGHT DIFFICULTY
SITTING_FOR_15_MINUTES: NO DIFFICULTY AT ALL
TWISTING/PIVOTING ON INVOLVED LEG: MODERATE DIFFICULTY
LIGHT_TO_MODERATE_WORK: NO DIFFICULTY AT ALL
WALKING_APPROXIMATELY_10_MINUTES: NO DIFFICULTY AT ALL
HOW_WOULD_YOU_RATE_YOUR_CURRENT_LEVEL_OF_FUNCTION_DURING_YOUR_USUAL_ACTIVITIES_OF_DAILY_LIVING_FROM_0_TO_100_WITH_100_BEING_YOUR_LEVEL_OF_FUNCTION_PRIOR_TO_YOUR_HIP_PROBLEM_AND_0_BEING_THE_INABILITY_TO_PERFORM_ANY_OF_YOUR_USUAL_DAILY_ACTIVITIES?: 75
ABILITY_TO_PERFORM_ACTIVITY_WITH_YOUR_NORMAL_TECHNIQUE: SLIGHT DIFFICULTY
DEEP SQUATTING: MODERATE DIFFICULTY
WALKING_FOR_APPROXIMATELY_10_MINUTES: NO DIFFICULTY AT ALL
SPORTS_HIGHEST_POTENTIAL_SCORE: 36
RECREATIONAL_ACTIVITIES: MODERATE DIFFICULTY
WALKING_DOWN_STEEP_HILLS: NO DIFFICULTY AT ALL
SPORTS_COUNT: 9
ADL_SCORE(%): 0
GOING_DOWN_1_FLIGHT_OF_STAIRS: NO DIFFICULTY AT ALL
SPORTS_SCORE(%): 0
SWINGING_OBJECTS_LIKE_A_GOLF_CLUB: NO DIFFICULTY AT ALL

## 2024-01-12 NOTE — PROGRESS NOTES
"PHYSICAL THERAPY EVALUATION  Type of Visit: Evaluation    See electronic medical record for Abuse and Falls Screening details.    Subjective       Mariluz reports that she has arthritis throughout her body, and her right greater than left hip started to hurt with limited mobility about a year ago, and it has gotten progressively worse since then. She said that most of the time the \"catching\" occurs with sit-to-stand transfers, pivoting, or rotating her hip in non-weight bearing (e.g., switching between gas and brake while driving). She describes it as a sharp pain with the feeling stuck, but once she \"manipulates it loose,\" the symptoms mostly resolve, and she has only mild soreness. She reports the pain as being \"deep in the joint\" with no clear anterior, posterior, or lateral bias, though she does motion to the lateral aspect of the hip when describing it. Worst pain: 5/10. Best pain: 0/10. Current pain: 1/10. She denies any relieving factors other than changes in position or avoiding aggravating movements/positions.  Presenting condition or subjective complaint: My hips are are sore down in the joint and certain movements something catches inside with sharp pain  Date of onset: 01/13/23    Relevant medical history: Arthritis; Diabetes; High blood pressure; Osteoarthritis; Overweight; Pain at night or rest   Dates & types of surgery: ACL repair, ovarian cyst removed, IVF    Prior diagnostic imaging/testing results: X-ray     Prior therapy history for the same diagnosis, illness or injury: No      Prior Level of Function  Transfers: Independent  Ambulation: Independent  ADL: Independent  IADL:  independent    Living Environment  Social support: With family members   Type of home: House   Stairs to enter the home: Yes 3 Is there a railing: Yes   Ramp: No   Stairs inside the home: Yes 16 Is there a railing: Yes   Help at home: None  Equipment owned:       Employment: Yes Emergency Department Tech  Hobbies/Interests: " Gardening, Weeblying, [employer], [employer]    Patient goals for therapy:      Pain assessment:  see subjective report     Objective   HIP EVALUATION  PAIN: see subjective report  INTEGUMENTARY (edema, incisions):   POSTURE:   GAIT:   Weightbearing Status:   Assistive Device(s):   Gait Deviations:   BALANCE/PROPRIOCEPTION:   WEIGHTBEARING ALIGNMENT:   NON-WEIGHTBEARING ALIGNMENT:    ROM:   - All lumbar AROM: WNL and pain-free  - Hip flexion: min limited bilaterally (pain-limited)  - Hip IR and ER: min-mod limited bilaterally (pain-limited)  PELVIC/SI SCREEN:   STRENGTH:   Pain: - none + mild ++ moderate +++ severe  Strength Scale: 0-5/5 Left Right   Hip Flexion 5 5   Hip Extension Min hip drop with leg lift from bridge Min hip drop with leg lift from bridge   Hip Abduction 5 5   Hip Adduction 5 5   Hip Internal Rotation 5, + (mild) 5, + (mild)   Hip External Rotation 5- 5-   Knee Flexion     Knee Extension       LE FLEXIBILITY:  bilateral hamstrings WFL  SPECIAL TESTS:    Left Right   BARBER Positive Positive   FADIR/Labrum/PADDY Positive Positive   Femoral Nerve     Momo's     Piriformis     Quadrant Testing     SLR Negative  Negative    Slump     Stork with Extension     Nam            FUNCTIONAL TESTS: Double Leg Squat: Anterior knee translation, minor Knee valgus, Hip internal rotation, Increased trunk lean, Improper use of glutes/hips, and Impaired weight distribution  Single Leg Squat: Anterior knee translation, minor Knee valgus, Hip internal rotation, Increased trunk lean, Improper use of glutes/hips, and Impaired weight distribution  PALPATION:   - Tender to palpation at right posterolateral hip; otherwise no tenderness to palpation at left gluts or bilateral greater trochanters, hip flexors, or hip adductors  JOINT MOBILITY:     Assessment & Plan   CLINICAL IMPRESSIONS  Medical Diagnosis: Tendinitis of right hip flexor    Treatment Diagnosis: Chronic right greater than left hip pain with limited mobility  "  Impression/Assessment: Patient is a 48 year old female with chronic right greater than left hip pain complaints.  The following significant findings have been identified: Pain, Decreased ROM/flexibility, Decreased strength, Impaired gait, Impaired muscle performance, and Decreased activity tolerance. These impairments interfere with their ability to perform work tasks, recreational activities, household chores, driving , and community mobility as compared to previous level of function.     Clinical Decision Making (Complexity):  Clinical Presentation: Evolving/Changing  Clinical Presentation Rationale: based on medical and personal factors listed in PT evaluation  Clinical Decision Making (Complexity): Low complexity    PLAN OF CARE  Treatment Interventions:  Interventions: Manual Therapy, Neuromuscular Re-education, Therapeutic Activity, Therapeutic Exercise, Self-Care/Home Management    Long Term Goals     PT Goal 1  Goal Identifier: HEP  Goal Description: Mariluz will demonstrate mastery of ([id number] need for cueing) and compliance with (completion on at least 5/7 days a week) her home exercise program to facilitate increased rate of improvement.  Goal Progress: In progress  Target Date: 02/09/24  PT Goal 2  Goal Identifier: Hip Outcome Score  Goal Description: Mariluz will demonstrate improved function as evidenced by an improved HOS score of at least 87.94%.  Goal Progress: 77.94%  Target Date: 03/08/24  PT Goal 3  Goal Identifier: Sit-to-stand Transfers  Goal Description: Mariluz will report at least a 50% reduction in occurences of \"catching\" with sit-to-stand transfers over the course of one week.  Goal Progress: In progress  Target Date: 03/08/24      Frequency of Treatment: 1 time per week  Duration of Treatment: 8 weeks    Recommended Referrals to Other Professionals:  none  Education Assessment:   Learner/Method: Family;Listening;Demonstration;Pictures/Video;No Barriers to Learning    Risks and " benefits of evaluation/treatment have been explained.   Patient/Family/caregiver agrees with Plan of Care.     Evaluation Time:     PT El[place], Low Complexity Minutes (75872): 28       Signing Clinician: Colton Moseley PT

## 2024-01-19 ENCOUNTER — THERAPY VISIT (OUTPATIENT)
Dept: PHYSICAL THERAPY | Facility: REHABILITATION | Age: 49
End: 2024-01-19
Attending: INTERNAL MEDICINE
Payer: COMMERCIAL

## 2024-01-19 DIAGNOSIS — M76.891 TENDINITIS OF RIGHT HIP FLEXOR: Primary | ICD-10-CM

## 2024-01-19 PROCEDURE — 97110 THERAPEUTIC EXERCISES: CPT | Mod: GP

## 2024-01-19 PROCEDURE — 97140 MANUAL THERAPY 1/> REGIONS: CPT | Mod: GP

## 2024-01-22 ENCOUNTER — OFFICE VISIT (OUTPATIENT)
Dept: RHEUMATOLOGY | Facility: CLINIC | Age: 49
End: 2024-01-22
Payer: COMMERCIAL

## 2024-01-22 VITALS
WEIGHT: 210.7 LBS | HEART RATE: 80 BPM | SYSTOLIC BLOOD PRESSURE: 136 MMHG | OXYGEN SATURATION: 97 % | DIASTOLIC BLOOD PRESSURE: 82 MMHG | BODY MASS INDEX: 36.17 KG/M2

## 2024-01-22 DIAGNOSIS — M15.0 PRIMARY OSTEOARTHRITIS INVOLVING MULTIPLE JOINTS: ICD-10-CM

## 2024-01-22 DIAGNOSIS — M18.0 PRIMARY OSTEOARTHRITIS OF BOTH FIRST CARPOMETACARPAL JOINTS: Primary | ICD-10-CM

## 2024-01-22 DIAGNOSIS — M25.50 HYPERMOBILITY ARTHRALGIA: ICD-10-CM

## 2024-01-22 PROCEDURE — 99214 OFFICE O/P EST MOD 30 MIN: CPT | Mod: 25 | Performed by: INTERNAL MEDICINE

## 2024-01-22 PROCEDURE — 20604 DRAIN/INJ JOINT/BURSA W/US: CPT | Mod: RT | Performed by: INTERNAL MEDICINE

## 2024-01-22 RX ORDER — TRIAMCINOLONE ACETONIDE 40 MG/ML
20 INJECTION, SUSPENSION INTRA-ARTICULAR; INTRAMUSCULAR ONCE
Status: COMPLETED | OUTPATIENT
Start: 2024-01-22 | End: 2024-01-22

## 2024-01-22 RX ADMIN — TRIAMCINOLONE ACETONIDE 20 MG: 40 INJECTION, SUSPENSION INTRA-ARTICULAR; INTRAMUSCULAR at 12:14

## 2024-01-22 NOTE — PROGRESS NOTES
"      Rheumatology follow-up visit note     Mariluz is a 48 year old female presents today for follow-up.    Mariluz was seen today for recheck.    Diagnoses and all orders for this visit:    Primary osteoarthritis of both first carpometacarpal joints  -     [place] ARTHROCNT ASPIR&/INJ SMALL JT/BURSAW/US REC RPRT  -     triamcinolone (KENALOG-40) injection 20 mg    Primary osteoarthritis involving multiple joints    Hypermobility arthralgia    This patient is here for worsening pain at the base of the thumb on the right side secondary to osteoarthritis we reviewed the x-rays of the hands, she has osteophyte formation of the first CMC's bilaterally worse on the right side.  She would like to try corticosteroid injection duloxetine has been of little help.  20 mg of Kenalog injected under ultrasound guidance.  She is also going to try diclofenac gel over-the-counter.  She will drop the duloxetine to once a day, for 15 days stop altogether.  Will meet here in 4 months or sooner.      HPI    Mariluz Duron is a 48 year old female is here for follow-up of polyarthralgias.  She has noted worsening pain.  This is in the bases of the thumbs, this is worse on the right side.  This is interfering with day-to-day activities.  Duloxetine has been but a little help a.  No history of trauma.  In the past corticosteroid injection have been helpful in one of her DIPs.  She has hypermobility likely risk factor and perhaps explaining why she has such premature OA at this age.    She was first seen in 2014.  She had ample evidence of osteoarthritis even at that stage in the background of hypermobility, having known herself to have \"double jointed nests\" since childhood.  At no point she or her family members have had  psoriasis.  Her original symptoms seem to have started around 2012 2013 timeframe when she started experiencing pain in her DIPs especially in the left hand index and then the right side corresponding number.  There was " associated deformity.  Even at that time she had painful Heberden's..  At 1 point there was some suggestion that she might have inflammatory joint disease hydroxychloroquine was started she had some improvement.  In 2019 she was seen here for the last time till today.  The reason she was not able to follow-up was health issues for now 22-year-old son who had had some traumatic experiences such as suicide of his best friend and discovering the body of his dad.  Now he is feeling better and she wants to go back to managing her own health.  In the interim she has developed diabetes.  She had rotator cuff impingement on the left shoulder and had a corticosteroid injection as well as ACL tear and repair of the left knee.        DETAILED EXAMINATION  01/22/24  :    Vitals:    01/22/24 1153   BP: 136/82   Pulse: 80   SpO2: 97%   Weight: 95.6 kg (210 lb 11.2 oz)     Alert oriented. Head including the face is examined for malar rash, heliotropes, scarring, lupus pernio. Eyes examined for redness such as in episcleritis/scleritis, periorbital lesions.   Neck/ Face examined for parotid gland swelling, range of motion of neck.  Left upper and lower and right upper and lower extremities examined for tenderness, swelling, warmth of the appendicular joints, range of motion, edema, rash.  Some of the important findings included: she does not have evidence of synovitis in the palpable joints of the upper extremities.  She has marked Heberden's.  She has tenderness of her CMC is worse on the right side.  Patient Active Problem List    Diagnosis Date Noted    Tendinitis of right hip flexor 01/12/2024     Priority: Medium    Routine general medical examination at a health care facility 05/12/2023     Priority: Medium    Dysuria 05/12/2023     Priority: Medium    Type 2 diabetes mellitus without complication, without long-term current use of insulin (H) 05/12/2023     Priority: Medium    Hypermobility arthralgia 02/06/2018     Priority:  Medium    Herpes Simplex Acute Gingivostomatitis      Priority: Medium    Avitaminosis D 08/26/2016     Priority: Medium    History of gestational diabetes 08/25/2016     Priority: Medium    Gastroesophageal reflux disease without esophagitis 08/25/2016     Priority: Medium    Class 2 severe obesity due to excess calories with serious comorbidity and body mass index (BMI) of 35.0 to 35.9 in adult (H) 08/25/2016     Priority: Medium    High risk medication use 08/18/2016     Priority: Medium    Polyarthritis Of The Hand      Priority: Medium    Generalized Osteoarthritis Of The Hand      Priority: Medium    Arthralgias In Multiple Sites      Priority: Medium    Joint Pain Fingers      Priority: Medium     Past Surgical History:   Procedure Laterality Date    ABDOMEN SURGERY  ?    I had an ovarian cyst removed and i also did IVF    ARTHROSCOPIC REPAIR ACL      2016    BIOPSY      When i had my colonoscopy    COLONOSCOPY N/A 10/12/2023    Procedure: COLONOSCOPY WITH POLYPECTOMY X1;  Surgeon: Willam Ansari MD;  Location: [facility]    GENITOURINARY SURGERY  2009?    Ovarian cyst removed    LASER ABLATION      ORTHOPEDIC SURGERY  6+ years ago    ACL surgery    OVARIAN CYST REMOVAL        Past Medical History:   Diagnosis Date    Arthritis 5+ years ago    Diabetes (H) 5-12-23    Elevated blood pressure reading in office without diagnosis of hypertension 05/12/2023    Gastroesophageal reflux disease     Hypertension 5-11-23     No Known Allergies  Current Outpatient Medications   Medication Sig Dispense Refill    aspirin 81 MG EC tablet Take 1 tablet (81 mg) by mouth daily      blood glucose (NO BRAND SPECIFIED) lancets standard Use to test blood sugar three times daily or as directed. 300 each 3    blood glucose (NO BRAND SPECIFIED) test strip Use to test blood sugar three times daily or as directed. 300 strip 3    blood glucose monitoring (NO BRAND SPECIFIED) meter device kit Use to test blood sugar 2 times  daily or as directed. 1 kit 0    Continuous Blood Gluc Sensor (FREESTYLE HELEN 2 SENSOR) MISC CHANGE SENSOR EVERY 14 DAYS AS DIRECTED. 2 each 0    DULoxetine (CYMBALTA) 30 MG capsule Take 1 capsule (30 mg) by mouth 2 times daily for 30 days 60 capsule 1    metFORMIN (GLUCOPHAGE) 500 MG tablet Take 1 tablet (500 mg) by mouth 2 times daily (with meals) 360 tablet 1    Semaglutide-Weight Management (WEGOVY) 2.4 MG/0.75ML pen Inject 2.4 mg Subcutaneous once a week 9 mL 3    simvastatin (ZOCOR) 40 MG tablet Take 1 tablet (40 mg) by mouth At Bedtime 90 tablet 3    valACYclovir (VALTREX) 1000 mg tablet [VALACYCLOVIR (VALTREX) 1000 MG TABLET] TAKE 2 TABLETS BY MOUTH 1 TIME. REPEAT WITH 2 TABLETS IN 12 HOURS 30 tablet 1     family history includes Anxiety Disorder in her mother; Coronary Artery Disease in her mother; Diabetes in her mother; Heart Disease in her paternal grandmother; Hyperlipidemia in her father; Hypertension in her father; No Known Problems in her daughter and son; Obesity in her brother, father, and mother; Osteoporosis in her mother; Sleep Apnea in her mother.  Social Connections: Not on file          WBC Count   Date Value Ref Range Status   05/12/2023 7.9 4.0 - 11.0 10e3/uL Final     RBC Count   Date Value Ref Range Status   05/12/2023 5.04 3.80 - 5.20 10e6/uL Final     Hemoglobin   Date Value Ref Range Status   05/12/2023 15.2 11.7 - 15.7 g/dL Final     Hematocrit   Date Value Ref Range Status   05/12/2023 43.8 35.0 - 47.0 % Final     MCV   Date Value Ref Range Status   05/12/2023 87 78 - 100 fL Final     MCH   Date Value Ref Range Status   05/12/2023 30.2 26.5 - 33.0 pg Final     Platelet Count   Date Value Ref Range Status   05/12/2023 256 150 - 450 10e3/uL Final     AST   Date Value Ref Range Status   05/12/2023 22 10 - 35 U/L Final     ALT   Date Value Ref Range Status   05/12/2023 31 10 - 35 U/L Final     Albumin   Date Value Ref Range Status   05/12/2023 4.1 3.5 - 5.2 g/dL Final   01/02/2019 3.8  3.5 - 5.0 g/dL Final     Alkaline Phosphatase   Date Value Ref Range Status   05/12/2023 99 35 - 104 U/L Final     Creatinine   Date Value Ref Range Status   05/12/2023 0.63 0.51 - 0.95 mg/dL Final     GFR Estimate   Date Value Ref Range Status   05/12/2023 >90 >60 mL/min/1.73m2 Final     Comment:     eGFR calculated using 2021 CKD-EPI equation.   01/02/2019 >60 >60 mL/min/1.73m2 Final     GFR Estimate If Black   Date Value Ref Range Status   01/02/2019 >60 >60 mL/min/1.73m2 Final     Creatinine Urine mg/dL   Date Value Ref Range Status   09/14/2023 238.0 mg/dL Final     Comment:     The reference ranges have not been established in urine creatinine. The results should be integrated into the clinical context for interpretation.     Erythrocyte Sedimentation Rate   Date Value Ref Range Status   09/14/2023 20 0 - 20 mm/hr Final

## 2024-02-02 ENCOUNTER — THERAPY VISIT (OUTPATIENT)
Dept: PHYSICAL THERAPY | Facility: REHABILITATION | Age: 49
End: 2024-02-02
Payer: COMMERCIAL

## 2024-02-02 DIAGNOSIS — M76.891 TENDINITIS OF RIGHT HIP FLEXOR: Primary | ICD-10-CM

## 2024-02-02 PROCEDURE — 97110 THERAPEUTIC EXERCISES: CPT | Mod: GP

## 2024-02-02 PROCEDURE — 97140 MANUAL THERAPY 1/> REGIONS: CPT | Mod: GP

## 2024-02-09 NOTE — PROGRESS NOTES
"    DISCHARGE  Reason for Discharge: Patient chooses to discontinue therapy due to financial/insurance issues (reported via [employer]).    Equipment Issued: NA    Discharge Plan: Patient to continue home program.    Referring Provider:  Maria T Rodriguez           02/02/24 0500   Appointment Info   Signing clinician's name / credentials Colton Moseley, PT   Visits Used 3   Medical Diagnosis Tendinitis of right hip flexor   PT Tx Diagnosis Chronic right greater than left hip pain with limited mobility   Progress Note/Certification   Start of Care Date 01/12/24   Onset of illness/injury or Date of Surgery 01/13/23   Therapy Frequency 1 time per week   Predicted Duration 8 weeks   Progress Note Due Date 02/09/24   PT Goal 1   Goal Identifier HEP   Goal Description Mariluz will demonstrate mastery of ([id number] need for cueing) and compliance with (completion on at least 5/7 days a week) her home exercise program to facilitate increased rate of improvement.   Goal Progress In progress   Target Date 02/09/24   PT Goal 2   Goal Identifier Hip Outcome Score   Goal Description Mariluz will demonstrate improved function as evidenced by an improved HOS score of at least 87.94%.   Goal Progress 77.94%   Target Date 03/08/24   PT Goal 3   Goal Identifier Sit-to-stand Transfers   Goal Description Mariluz will report at least a 50% reduction in occurences of \"catching\" with sit-to-stand transfers over the course of one week.   Goal Progress In progress   Target Date 03/08/24   Subjective Report   Subjective Report Mariluz was sick last week, but she is feeling better today. She reports largely unchanged hip symptoms since last visit, however, later during the visit she reports increased strength.   Objective Measures   Objective Measures Objective Measure 1;Objective Measure 2;Objective Measure 3;Objective Measure 4   Objective Measure 1   Objective Measure Hip ER and IR AROM   Details Initial evaluation: Min-mod limited bilaterally " (pain-limited)   Objective Measure 2   Objective Measure Hip Strength   Details Initial evaluation: IR: 5/5 bilateral (painful). ER: 5-/5 bilateral.   Objective Measure 3   Objective Measure Worst Pain   Details Since last visit: 8/10   Therapeutic Procedure/Exercise   Therapeutic Procedures: strength, endurance, ROM, flexibillity minutes (31873) 23   Ther Proc 1 Recumbent bike + subjective report   Ther Proc 1 - Details 5 minutes   Ther Proc 2 Bridge with hip adduction   Ther Proc 2 - Details 3 x 12   Ther Proc 3 Supine hip flexor stretch with leg off edge of table   Ther Proc 3 - Details 2 x 45 seconds bilateral   Ther Proc 5 Standing hip abduction   Ther Proc 5 - Details 3 x 12 bilateral (cueing to avoid trunk lean and hip rotation)   Skilled Intervention Exercises to improve hip mobility/flexibility and strength   Patient Response/Progress Added standing hip abduction as an alternative to clam shells due to pain getting into side-lying; also taught supine hip flexor stretch as an alterantive to standing and combined hip adduction with bridging. Mariluz tolerated all exercises and progressions well without increased symptoms and with specific cueing as noted above.   Manual Therapy   Manual Therapy: Mobilization, MFR, MLD, friction massage minutes (52781) 9   Manual Therapy 1 Joint mobilizations   Manual Therapy 1 - Details Grades II-IV lateral and inferolateral right and left hip joint mobilizations in supine   Skilled Intervention Joint mobilizations to decreased pain and increase mobility   Patient Response/Progress Mariluz reported symptom relief during and following intervention.   Education   Learner/Method Family;Listening;Demonstration;Pictures/Video;No Barriers to Learning   Plan   Home program See PTRx.   Plan for next session Continue to progress general hip and core mobility/flexibility and strengthening exercises. Teach hip self-mobilizations. Continue manual therapy (hip mobilizations and STM).    Comments   Comments Impression/Assessment: Mariluz has made little progess as she is still reports largely unchanged symptoms, though today is only her third visit due to illness last week. She does, however, report increasing strength. Therapy continues to focus on hip mobility and strength exercises, with several alternative positions/methods taught today for increased tolerance. She also reported that the manual therapy last week was helpful, so this was repeated today, and she again reported symptom relief. She will benefit from continued physical therapy to progress toward her goals and improve her function.   Total Session Time   Timed Code Treatment Minutes 32   Total Treatment Time (sum of timed and untimed services) 32

## 2024-02-12 DIAGNOSIS — E11.9 TYPE 2 DIABETES MELLITUS WITHOUT COMPLICATION, WITHOUT LONG-TERM CURRENT USE OF INSULIN (H): ICD-10-CM

## 2024-05-12 ENCOUNTER — HEALTH MAINTENANCE LETTER (OUTPATIENT)
Age: 49
End: 2024-05-12

## 2024-05-22 ENCOUNTER — OFFICE VISIT (OUTPATIENT)
Dept: RHEUMATOLOGY | Facility: CLINIC | Age: 49
End: 2024-05-22
Payer: COMMERCIAL

## 2024-05-22 VITALS
OXYGEN SATURATION: 98 % | DIASTOLIC BLOOD PRESSURE: 92 MMHG | SYSTOLIC BLOOD PRESSURE: 138 MMHG | WEIGHT: 207.1 LBS | BODY MASS INDEX: 35.55 KG/M2 | HEART RATE: 68 BPM

## 2024-05-22 DIAGNOSIS — M15.0 PRIMARY OSTEOARTHRITIS INVOLVING MULTIPLE JOINTS: Primary | ICD-10-CM

## 2024-05-22 DIAGNOSIS — M25.50 HYPERMOBILITY ARTHRALGIA: ICD-10-CM

## 2024-05-22 DIAGNOSIS — M25.50 POLYARTHRALGIA: ICD-10-CM

## 2024-05-22 PROCEDURE — G2211 COMPLEX E/M VISIT ADD ON: HCPCS | Performed by: INTERNAL MEDICINE

## 2024-05-22 PROCEDURE — 99214 OFFICE O/P EST MOD 30 MIN: CPT | Performed by: INTERNAL MEDICINE

## 2024-05-22 RX ORDER — MULTIVITAMIN
1 TABLET ORAL DAILY
COMMUNITY

## 2024-05-22 NOTE — PROGRESS NOTES
Rheumatology follow-up visit note     Mariluz is a 48 year old female presents today for follow-up.    Mariluz was seen today for recheck.    Diagnoses and all orders for this visit:    Primary osteoarthritis involving multiple joints    Hypermobility arthralgia    Polyarthralgia        She has had a good response to corticosteroid injections such as of the first CMC's.  We talked about nonpharmacologic measures she is going to work on those.  Instead of Aleve try acetaminophen.  Will meet in 6 months or sooner.  The longitudinal plan of care for the diagnosis(es)/condition(s) as documented were addressed during this visit. Due to the added complexity in care, I will continue to support Mariluz in the subsequent management and with ongoing continuity of care.     Follow up in 6 months.    HPI    Mariluz Duron is a 48 year old female is here for follow-up of polyarthralgias.  She has osteoarthritis which is widespread.  She has not had any good success with duloxetine as noted on her previous visit that is been discontinued.  She is now on week OV.  She has lost 50 pounds.  She is altered her diet as well as in view of the above and the diabetes.  She noted excellent response to corticosteroid injection at the first CMC done on her previous visit.  She has some discomfort he had.  She takes Aleve intermittently will ask her to go for acetaminophen sustained-release instead and see if that provides her with relief.  She has the option of topical diclofenac.    No history of trauma.  In the past corticosteroid injection have been helpful in one of her DIPs.  She has hypermobility likely risk factor and perhaps explaining why she has such premature OA at this age.       DETAILED EXAMINATION  05/22/24  :    Vitals:    05/22/24 1013   BP: (!) 138/92   BP Location: Left arm   Pulse: 68   SpO2: 98%   Weight: 93.9 kg (207 lb 1.6 oz)     Alert oriented. Head including the face is examined for malar rash, heliotropes,  scarring, lupus pernio. Eyes examined for redness such as in episcleritis/scleritis, periorbital lesions.   Neck/ Face examined for parotid gland swelling, range of motion of neck.  Left upper and lower and right upper and lower extremities examined for tenderness, swelling, warmth of the appendicular joints, range of motion, edema, rash.  Some of the important findings included: she does not have evidence of synovitis in the palpable joints of the upper extremities.  No significant deformities of the digits.  + Heberden nodes.  Range of motion of the shoulders  show full abduction.  No JLT effusion or warmth of the knees.  she does not have dactylitis of the digits.     Patient Active Problem List    Diagnosis Date Noted    Tendinitis of right hip flexor 01/12/2024     Priority: Medium    Routine general medical examination at a health care facility 05/12/2023     Priority: Medium    Dysuria 05/12/2023     Priority: Medium    Type 2 diabetes mellitus without complication, without long-term current use of insulin (H) 05/12/2023     Priority: Medium    Hypermobility arthralgia 02/06/2018     Priority: Medium    Herpes Simplex Acute Gingivostomatitis      Priority: Medium    Avitaminosis D 08/26/2016     Priority: Medium    History of gestational diabetes 08/25/2016     Priority: Medium    Gastroesophageal reflux disease without esophagitis 08/25/2016     Priority: Medium    Class 2 severe obesity due to excess calories with serious comorbidity and body mass index (BMI) of 35.0 to 35.9 in adult (H) 08/25/2016     Priority: Medium    High risk medication use 08/18/2016     Priority: Medium    Polyarthritis Of The Hand      Priority: Medium    Generalized Osteoarthritis Of The Hand      Priority: Medium    Arthralgias In Multiple Sites      Priority: Medium    Joint Pain Fingers      Priority: Medium     Past Surgical History:   Procedure Laterality Date    ABDOMEN SURGERY  ?    I had an ovarian cyst removed and i also  did IVF    ARTHROSCOPIC REPAIR ACL      2016    BIOPSY      When i had my colonoscopy    COLONOSCOPY N/A 10/12/2023    Procedure: COLONOSCOPY WITH POLYPECTOMY X1;  Surgeon: Willam Ansari MD;  Location: [facility]    GENITOURINARY SURGERY  2009?    Ovarian cyst removed    LASER ABLATION      ORTHOPEDIC SURGERY  6+ years ago    ACL surgery    OVARIAN CYST REMOVAL        Past Medical History:   Diagnosis Date    Arthritis 5+ years ago    Diabetes (H) 5-12-23    Elevated blood pressure reading in office without diagnosis of hypertension 05/12/2023    Gastroesophageal reflux disease     Hypertension 5-11-23     No Known Allergies  Current Outpatient Medications   Medication Sig Dispense Refill    aspirin 81 MG EC tablet Take 1 tablet (81 mg) by mouth daily      blood glucose (NO BRAND SPECIFIED) lancets standard Use to test blood sugar three times daily or as directed. 300 each 3    blood glucose (NO BRAND SPECIFIED) test strip Use to test blood sugar three times daily or as directed. 300 strip 3    blood glucose monitoring (NO BRAND SPECIFIED) meter device kit Use to test blood sugar 2 times daily or as directed. 1 kit 0    Continuous Blood Gluc Sensor (FREESTYLE HELEN 2 SENSOR) MISC CHANGE SENSOR EVERY 14 DAYS AS DIRECTED. 2 each 3    DULoxetine (CYMBALTA) 30 MG capsule Take 1 capsule (30 mg) by mouth 2 times daily for 30 days 60 capsule 1    metFORMIN (GLUCOPHAGE) 500 MG tablet Take 1 tablet (500 mg) by mouth 2 times daily (with meals) 360 tablet 1    Semaglutide-Weight Management (WEGOVY) 2.4 MG/0.75ML pen Inject 2.4 mg Subcutaneous once a week 9 mL 3    simvastatin (ZOCOR) 40 MG tablet Take 1 tablet (40 mg) by mouth At Bedtime 90 tablet 3    valACYclovir (VALTREX) 1000 mg tablet [VALACYCLOVIR (VALTREX) 1000 MG TABLET] TAKE 2 TABLETS BY MOUTH 1 TIME. REPEAT WITH 2 TABLETS IN 12 HOURS (Patient not taking: Reported on 1/22/2024) 30 tablet 1     family history includes Anxiety Disorder in her mother; Coronary  Artery Disease in her mother; Diabetes in her mother; Heart Disease in her paternal grandmother; Hyperlipidemia in her father; Hypertension in her father; No Known Problems in her daughter and son; Obesity in her brother, father, and mother; Osteoporosis in her mother; Sleep Apnea in her mother.  Social Connections: Not on file          WBC Count   Date Value Ref Range Status   05/12/2023 7.9 4.0 - 11.0 10e3/uL Final     RBC Count   Date Value Ref Range Status   05/12/2023 5.04 3.80 - 5.20 10e6/uL Final     Hemoglobin   Date Value Ref Range Status   05/12/2023 15.2 11.7 - 15.7 g/dL Final     Hematocrit   Date Value Ref Range Status   05/12/2023 43.8 35.0 - 47.0 % Final     MCV   Date Value Ref Range Status   05/12/2023 87 78 - 100 fL Final     MCH   Date Value Ref Range Status   05/12/2023 30.2 26.5 - 33.0 pg Final     Platelet Count   Date Value Ref Range Status   05/12/2023 256 150 - 450 10e3/uL Final     AST   Date Value Ref Range Status   05/12/2023 22 10 - 35 U/L Final     ALT   Date Value Ref Range Status   05/12/2023 31 10 - 35 U/L Final     Albumin   Date Value Ref Range Status   05/12/2023 4.1 3.5 - 5.2 g/dL Final   01/02/2019 3.8 3.5 - 5.0 g/dL Final     Alkaline Phosphatase   Date Value Ref Range Status   05/12/2023 99 35 - 104 U/L Final     Creatinine   Date Value Ref Range Status   05/12/2023 0.63 0.51 - 0.95 mg/dL Final     GFR Estimate   Date Value Ref Range Status   05/12/2023 >90 >60 mL/min/1.73m2 Final     Comment:     eGFR calculated using 2021 CKD-EPI equation.   01/02/2019 >60 >60 mL/min/1.73m2 Final     GFR Estimate If Black   Date Value Ref Range Status   01/02/2019 >60 >60 mL/min/1.73m2 Final     Creatinine Urine mg/dL   Date Value Ref Range Status   09/14/2023 238.0 mg/dL Final     Comment:     The reference ranges have not been established in urine creatinine. The results should be integrated into the clinical context for interpretation.     Erythrocyte Sedimentation Rate   Date Value Ref  Range Status   09/14/2023 20 0 - 20 mm/hr Final

## 2024-05-24 ENCOUNTER — OFFICE VISIT (OUTPATIENT)
Dept: FAMILY MEDICINE | Facility: CLINIC | Age: 49
End: 2024-05-24
Payer: COMMERCIAL

## 2024-05-24 VITALS
DIASTOLIC BLOOD PRESSURE: 84 MMHG | TEMPERATURE: 98.8 F | RESPIRATION RATE: 16 BRPM | BODY MASS INDEX: 35.3 KG/M2 | SYSTOLIC BLOOD PRESSURE: 135 MMHG | HEIGHT: 64 IN | OXYGEN SATURATION: 99 % | WEIGHT: 206.8 LBS | HEART RATE: 64 BPM

## 2024-05-24 DIAGNOSIS — E66.01 CLASS 2 SEVERE OBESITY DUE TO EXCESS CALORIES WITH SERIOUS COMORBIDITY AND BODY MASS INDEX (BMI) OF 35.0 TO 35.9 IN ADULT (H): Primary | ICD-10-CM

## 2024-05-24 DIAGNOSIS — E66.812 CLASS 2 SEVERE OBESITY DUE TO EXCESS CALORIES WITH SERIOUS COMORBIDITY AND BODY MASS INDEX (BMI) OF 35.0 TO 35.9 IN ADULT (H): Primary | ICD-10-CM

## 2024-05-24 DIAGNOSIS — E11.9 TYPE 2 DIABETES MELLITUS WITHOUT COMPLICATION, WITHOUT LONG-TERM CURRENT USE OF INSULIN (H): ICD-10-CM

## 2024-05-24 PROCEDURE — 99213 OFFICE O/P EST LOW 20 MIN: CPT | Performed by: FAMILY MEDICINE

## 2024-05-24 NOTE — PROGRESS NOTES
Assessment & Plan   Problem List Items Addressed This Visit       Class 2 severe obesity due to excess calories with serious comorbidity and body mass index (BMI) of 35.0 to 35.9 in adult (H) - Primary     48 year old year old female in clinic today to discuss treatment of the following conditions through diet and lifestyle modification and weight loss:  1. Class 2 severe obesity due to excess calories with serious comorbidity and body mass index (BMI) of 35.0 to 35.9 in adult (H)    2. Type 2 diabetes mellitus without complication, without long-term current use of insulin (H)      The patient's weight loss result since the last visit was successful based on weight loss and weight stability.  General she continues to execute quite restricted diet focused on consuming adequate protein.  She is active physically and gardening with her family.  She has a plan to be a little bit more adherent to overall dietary recommendations.  I believe this will result in additional weight loss.  The side effects which have been bothersome over time have finally subsided and she generally feels well.  She has a follow-up visit scheduled next month with her primary care provider during which laboratory testing will be repeated.  Zepbound?    BMI: Body mass index is 35.5 kg/m .  Ideal body weight: 54.7 kg (120 lb 9.5 oz)  Adjusted ideal body weight: 70.3 kg (155 lb 1.2 oz)    Current therapies:    Medications: YES Saxenda initially.  Wegovy subsequently.   - Starting weight for GLP1 receptor agonist: 227 pounds   - Total weight loss: 21 lbs (9.3%)    Nutritional goals/strategies: reviewed.   - caloric restriction: Yes   - time restriction: NO   - diet (macronutrient) framework: high protein/low carbohydrate    Follow-up: 6 months           Type 2 diabetes mellitus without complication, without long-term current use of insulin (H)        Logan Mcgraw is a 48 year old, presenting for the following health issues:  Weight Loss  "(Follow-up/)        5/24/2024     1:42 PM   Additional Questions   Roomed by xl     Patient presents for treatment of chronic, comorbid conditions using intensive therapeutic lifestyle interventions including nutrition, physical activity, and behavior management.   - successes: family dinner. She adjusts family meal to her needs. Energy high. Yardwork.   - struggles: weight stability.     - movement: activive with gardening.     - tracking/journaling: ad brittney   - following nutritional plan: yes.  Deviations from plan: infrequent   - hunger: Stable.    - medication benefits: helpful. side effects: severe nausea that is finally better.              Objective    /84 (BP Location: Left arm, Patient Position: Sitting, Cuff Size: Adult Large)   Pulse 64   Temp 98.8  F (37.1  C) (Oral)   Resp 16   Ht 1.626 m (5' 4\")   Wt 93.8 kg (206 lb 12.8 oz)   LMP  (LMP Unknown)   SpO2 99%   BMI 35.50 kg/m    Body mass index is 35.5 kg/m .  Physical Exam  Nursing note reviewed.   Constitutional:       General: She is not in acute distress.     Appearance: Normal appearance. She is not ill-appearing.   HENT:      Head: Normocephalic and atraumatic.   Eyes:      Extraocular Movements: Extraocular movements intact.      Conjunctiva/sclera: Conjunctivae normal.   Pulmonary:      Effort: Pulmonary effort is normal.   Neurological:      Mental Status: She is alert and oriented to person, place, and time.   Psychiatric:         Attention and Perception: Attention normal.         Mood and Affect: Mood normal.         Speech: Speech normal.         Thought Content: Thought content normal.                Signed Electronically by: Len Melchor MD    "

## 2024-05-24 NOTE — ASSESSMENT & PLAN NOTE
48 year old year old female in clinic today to discuss treatment of the following conditions through diet and lifestyle modification and weight loss:  1. Class 2 severe obesity due to excess calories with serious comorbidity and body mass index (BMI) of 35.0 to 35.9 in adult (H)    2. Type 2 diabetes mellitus without complication, without long-term current use of insulin (H)      The patient's weight loss result since the last visit was successful based on weight loss and weight stability.  General she continues to execute quite restricted diet focused on consuming adequate protein.  She is active physically and gardening with her family.  She has a plan to be a little bit more adherent to overall dietary recommendations.  I believe this will result in additional weight loss.  The side effects which have been bothersome over time have finally subsided and she generally feels well.  She has a follow-up visit scheduled next month with her primary care provider during which laboratory testing will be repeated.  Zepbound?    BMI: Body mass index is 35.5 kg/m .  Ideal body weight: 54.7 kg (120 lb 9.5 oz)  Adjusted ideal body weight: 70.3 kg (155 lb 1.2 oz)    Current therapies:    Medications: YES Saxenda initially.  Wegovy subsequently.   - Starting weight for GLP1 receptor agonist: 227 pounds   - Total weight loss: 21 lbs (9.3%)    Nutritional goals/strategies: reviewed.   - caloric restriction: Yes   - time restriction: NO   - diet (macronutrient) framework: high protein/low carbohydrate    Follow-up: 6 months

## 2024-05-29 ENCOUNTER — LAB (OUTPATIENT)
Dept: LAB | Facility: CLINIC | Age: 49
End: 2024-05-29
Payer: COMMERCIAL

## 2024-05-29 ENCOUNTER — E-VISIT (OUTPATIENT)
Dept: FAMILY MEDICINE | Facility: CLINIC | Age: 49
End: 2024-05-29
Payer: COMMERCIAL

## 2024-05-29 DIAGNOSIS — R30.0 DYSURIA: Primary | ICD-10-CM

## 2024-05-29 DIAGNOSIS — R30.0 DYSURIA: ICD-10-CM

## 2024-05-29 LAB
ALBUMIN UR-MCNC: ABNORMAL MG/DL
APPEARANCE UR: ABNORMAL
BACTERIA #/AREA URNS HPF: ABNORMAL /HPF
BILIRUB UR QL STRIP: NEGATIVE
CLUE CELLS: ABNORMAL
COLOR UR AUTO: YELLOW
GLUCOSE UR STRIP-MCNC: NEGATIVE MG/DL
HGB UR QL STRIP: ABNORMAL
KETONES UR STRIP-MCNC: NEGATIVE MG/DL
LEUKOCYTE ESTERASE UR QL STRIP: ABNORMAL
MUCOUS THREADS #/AREA URNS LPF: PRESENT /LPF
NITRATE UR QL: NEGATIVE
PH UR STRIP: 5.5 [PH] (ref 5–8)
RBC #/AREA URNS AUTO: ABNORMAL /HPF
SP GR UR STRIP: >=1.03 (ref 1–1.03)
SQUAMOUS #/AREA URNS AUTO: ABNORMAL /LPF
TRICHOMONAS, WET PREP: ABNORMAL
UROBILINOGEN UR STRIP-ACNC: 0.2 E.U./DL
WBC #/AREA URNS AUTO: ABNORMAL /HPF
WBC'S/HIGH POWER FIELD, WET PREP: ABNORMAL
YEAST, WET PREP: ABNORMAL

## 2024-05-29 PROCEDURE — 87210 SMEAR WET MOUNT SALINE/INK: CPT

## 2024-05-29 PROCEDURE — 99421 OL DIG E/M SVC 5-10 MIN: CPT

## 2024-05-29 PROCEDURE — 81001 URINALYSIS AUTO W/SCOPE: CPT

## 2024-05-29 NOTE — PATIENT INSTRUCTIONS
Dear Mariluz Duron,     After reviewing your responses, I would like you to come in for a urine test to make sure we treat you correctly. This urine test is to evaluate you for a possible urinary tract infection, and should be scheduled for today or tomorrow. Schedule a Lab Only appointment here.     Lab appointments are not available at most locations on the weekends. If no Lab Only appointment is available, you should be seen in any of our convenient Walk-in or Urgent Care Centers, which can be found on our website here.     You will receive instructions with your results in [employer] once they are available.     If your symptoms worsen, you develop pain in your back or stomach, develop fevers, or are not improving in 5 days, please contact your primary care provider for an appointment or visit a Walk-in or Urgent Care Center to be seen.     Thanks again for choosing us as your health care partner,     SHERMAN Gonzalez CNP

## 2024-06-13 ENCOUNTER — OFFICE VISIT (OUTPATIENT)
Dept: FAMILY MEDICINE | Facility: CLINIC | Age: 49
End: 2024-06-13
Payer: COMMERCIAL

## 2024-06-13 VITALS
SYSTOLIC BLOOD PRESSURE: 127 MMHG | HEIGHT: 63 IN | BODY MASS INDEX: 36.8 KG/M2 | WEIGHT: 207.7 LBS | DIASTOLIC BLOOD PRESSURE: 69 MMHG | TEMPERATURE: 98.6 F | OXYGEN SATURATION: 98 % | RESPIRATION RATE: 16 BRPM | HEART RATE: 73 BPM

## 2024-06-13 DIAGNOSIS — Z00.00 ROUTINE GENERAL MEDICAL EXAMINATION AT A HEALTH CARE FACILITY: ICD-10-CM

## 2024-06-13 DIAGNOSIS — Z86.19 H/O COLD SORES: ICD-10-CM

## 2024-06-13 DIAGNOSIS — E78.5 HYPERLIPIDEMIA LDL GOAL <70: ICD-10-CM

## 2024-06-13 DIAGNOSIS — E11.9 TYPE 2 DIABETES MELLITUS WITHOUT COMPLICATION, WITHOUT LONG-TERM CURRENT USE OF INSULIN (H): Primary | ICD-10-CM

## 2024-06-13 DIAGNOSIS — E66.01 CLASS 2 SEVERE OBESITY DUE TO EXCESS CALORIES WITH SERIOUS COMORBIDITY AND BODY MASS INDEX (BMI) OF 35.0 TO 35.9 IN ADULT (H): ICD-10-CM

## 2024-06-13 DIAGNOSIS — M15.0 PRIMARY OSTEOARTHRITIS INVOLVING MULTIPLE JOINTS: ICD-10-CM

## 2024-06-13 DIAGNOSIS — B00.2 HERPETIC GINGIVOSTOMATITIS: ICD-10-CM

## 2024-06-13 DIAGNOSIS — E55.9 AVITAMINOSIS D: ICD-10-CM

## 2024-06-13 DIAGNOSIS — Z12.31 ENCOUNTER FOR SCREENING MAMMOGRAM FOR BREAST CANCER: ICD-10-CM

## 2024-06-13 DIAGNOSIS — E66.812 CLASS 2 SEVERE OBESITY DUE TO EXCESS CALORIES WITH SERIOUS COMORBIDITY AND BODY MASS INDEX (BMI) OF 35.0 TO 35.9 IN ADULT (H): ICD-10-CM

## 2024-06-13 PROBLEM — R30.0 DYSURIA: Status: RESOLVED | Noted: 2023-05-12 | Resolved: 2024-06-13

## 2024-06-13 LAB
HBA1C MFR BLD: 5.8 % (ref 0–5.6)
HOLD SPECIMEN: NORMAL

## 2024-06-13 PROCEDURE — 80048 BASIC METABOLIC PNL TOTAL CA: CPT

## 2024-06-13 PROCEDURE — 36415 COLL VENOUS BLD VENIPUNCTURE: CPT

## 2024-06-13 PROCEDURE — 82306 VITAMIN D 25 HYDROXY: CPT

## 2024-06-13 PROCEDURE — 83036 HEMOGLOBIN GLYCOSYLATED A1C: CPT

## 2024-06-13 PROCEDURE — 99396 PREV VISIT EST AGE 40-64: CPT

## 2024-06-13 PROCEDURE — 80061 LIPID PANEL: CPT

## 2024-06-13 PROCEDURE — 99214 OFFICE O/P EST MOD 30 MIN: CPT | Mod: 25

## 2024-06-13 RX ORDER — VALACYCLOVIR HYDROCHLORIDE 1 G/1
TABLET, FILM COATED ORAL
Qty: 30 TABLET | Refills: 1 | Status: SHIPPED | OUTPATIENT
Start: 2024-06-13

## 2024-06-13 RX ORDER — SIMVASTATIN 40 MG
40 TABLET ORAL AT BEDTIME
Qty: 90 TABLET | Refills: 3 | Status: SHIPPED | OUTPATIENT
Start: 2024-06-13

## 2024-06-13 SDOH — HEALTH STABILITY: PHYSICAL HEALTH: ON AVERAGE, HOW MANY DAYS PER WEEK DO YOU ENGAGE IN MODERATE TO STRENUOUS EXERCISE (LIKE A BRISK WALK)?: 3 DAYS

## 2024-06-13 SDOH — HEALTH STABILITY: PHYSICAL HEALTH: ON AVERAGE, HOW MANY MINUTES DO YOU ENGAGE IN EXERCISE AT THIS LEVEL?: 30 MIN

## 2024-06-13 ASSESSMENT — SOCIAL DETERMINANTS OF HEALTH (SDOH): HOW OFTEN DO YOU GET TOGETHER WITH FRIENDS OR RELATIVES?: ONCE A WEEK

## 2024-06-13 NOTE — ASSESSMENT & PLAN NOTE
Well-controlled with intermittent use of Valtrex.  Frequency of outbreaks do not necessitate suppression therapy.  Refill provided today.

## 2024-06-13 NOTE — ASSESSMENT & PLAN NOTE
Annual exam.  Colonoscopy: Up-to-date.  Pap: Up-to-date.  Mammogram: Ordered.  Immunizations: Declines PCV 20 and COVID.  Discussed.  Otherwise, up-to-date.  Labs: Discussed and offered.  Mood: Stable.  BMI: As documented.  STI screening: Declines.  Discussed bone health.  No indication for early DEXA.  Recommend follow-up in 1 year annual exam.

## 2024-06-13 NOTE — PROGRESS NOTES
Preventive Care Visit  [facility]  SHERMAN Gonzalez CNP, Family Medicine  Jun 13, 2024      Assessment & Plan   Problem List Items Addressed This Visit       Herpes Simplex Acute Gingivostomatitis     Well-controlled with intermittent use of Valtrex.  Frequency of outbreaks do not necessitate suppression therapy.  Refill provided today.         Relevant Medications    valACYclovir (VALTREX) 1000 mg tablet    Osteoarthritis of multiple joints     Patient follows with rheumatology.  Specialty notes reviewed today.  She had excellent results with the [facility] steroid injections and plans to follow-up with rheumatology as needed.         Class 2 severe obesity due to excess calories with serious comorbidity and body mass index (BMI) of 35.0 to 35.9 in adult (H)     Patient is managed by Dr. Melchor comprehensive weight management today.  She has successfully lost approximately 50 pounds.  Encouraged continued work with weight management and I encouraged her to reach out with any additional needs.  Updated metabolic labs were obtained today.         Relevant Medications    metFORMIN (GLUCOPHAGE) 500 MG tablet    Avitaminosis D     Updated labs today.  Does not supplement.         Relevant Orders    Vitamin D Deficiency    Routine general medical examination at a health care facility     Annual exam.  Colonoscopy: Up-to-date.  Pap: Up-to-date.  Mammogram: Ordered.  Immunizations: Declines PCV 20 and COVID.  Discussed.  Otherwise, up-to-date.  Labs: Discussed and offered.  Mood: Stable.  BMI: As documented.  STI screening: Declines.  Discussed bone health.  No indication for early DEXA.  Recommend follow-up in 1 year annual exam.         Type 2 diabetes mellitus without complication, without long-term current use of insulin (H) - Primary     Hemoglobin A1c today is 5.8, indicating excellent control of her diabetes on current regimen of metformin 500 mg twice daily and Wegovy. She denies any  symptoms of hyperglycemia or hypoglycemia.  She did have to cancel her recent eye exam, but plans to reschedule.  Utilizes a continuous glucose monitor and I have sent in a refill on this today.  Foot exam today was completed and without findings.  She is on a cholesterol-lowering medication.  She is not currently on an ACE inhibitor or ARB.  Will plan on medication check in 6 months.  Patient expressed understanding of and agreement this plan.  All questions were answered.         Relevant Medications    Continuous Glucose Sensor (FREESTYLE HELEN 2 SENSOR) MISC    metFORMIN (GLUCOPHAGE) 500 MG tablet    Other Relevant Orders    Hemoglobin A1c (Completed)    BASIC METABOLIC PANEL     Other Visit Diagnoses       H/O cold sores        Relevant Medications    valACYclovir (VALTREX) 1000 mg tablet    Hyperlipidemia LDL goal <70        Relevant Medications    simvastatin (ZOCOR) 40 MG tablet    Other Relevant Orders    Lipid panel reflex to direct LDL Non-fasting    Encounter for screening mammogram for breast cancer        Relevant Orders    MA Screen Bilateral w/Michi           Patient has been advised of split billing requirements and indicates understanding: Yes    Counseling  Appropriate preventive services were discussed with this patient, including applicable screening as appropriate for fall prevention, nutrition, physical activity, Tobacco-use cessation, weight loss and cognition.  Checklist reviewing preventive services available has been given to the patient.  Reviewed patient's diet, addressing concerns and/or questions.   She is at risk for lack of exercise and has been provided with information to increase physical activity for the benefit of her well-being.     Logan Mcgraw is a 48 year old, presenting for the following:  prevenative care        6/13/2024     4:17 PM   Additional Questions   Roomed by MS   Accompanied by Self         6/13/2024     4:17 PM   Patient Reported Additional Medications  "  Patient reports taking the following new medications NA        Via the Health Maintenance questionnaire, the patient has reported the following services have been completed -Eye Exam: [facility] 2023-05-01, this information has been sent to the abstraction team.    Health Care Directive  Patient does not have a Health Care Directive or Living Will: Discussed advance care planning with patient; however, patient declined at this time.    HPI        6/13/2024   General Health   How would you rate your overall physical health? Good   Feel stress (tense, anxious, or unable to sleep) To some extent   (!) STRESS CONCERN      6/13/2024   Nutrition   Three or more servings of calcium each day? Yes   Diet: Diabetic   How many servings of fruit and vegetables per day? (!) 2-3   How many sweetened beverages each day? 0-1     \"OK.\" Family is not 'on board' with the diabetic diet. Working on this.        6/13/2024   Exercise   Days per week of moderate/strenous exercise 3 days   Average minutes spent exercising at this level 30 min   Active.         6/13/2024   Social Factors   Frequency of gathering with friends or relatives Once a week   Worry food won't last until get money to buy more No   Food not last or not have enough money for food? No   Do you have housing?  Yes   Are you worried about losing your housing? No   Lack of transportation? No   Unable to get utilities (heat,electricity)? No         6/13/2024   Dental   Dentist two times every year? Yes         6/13/2024   TB Screening   Were you born outside of the [place]? No     Today's PHQ-2 Score:       1/5/2024     1:41 PM   PHQ-2 ( 1999 Pfizer)   Q1: Little interest or pleasure in doing things 0   Q2: Feeling down, depressed or hopeless 0   PHQ-2 Score 0         6/13/2024   Substance Use   Alcohol more than 3/day or more than 7/wk No   Do you use any other substances recreationally? No     Social History     Tobacco Use    Smoking status: Former     Current " packs/day: 0.00     Average packs/day: 1.5 packs/day for 18.0 years (27.0 ttl pk-yrs)     Types: Cigarettes     Start date: 5/1/1991     Quit date: 5/1/2009     Years since quitting: 15.1     Passive exposure: Past    Smokeless tobacco: Never   Vaping Use    Vaping status: Never Used   Substance Use Topics    Alcohol use: Yes     Comment: Alcoholic Drinks/day: occasionally    Drug use: No         4/10/2023   LAST FHS-7 RESULTS   1st degree relative breast or ovarian cancer No   Any relative bilateral breast cancer No   Any male have breast cancer No   Any ONE woman have BOTH breast AND ovarian cancer No   Any woman with breast cancer before 50yrs No   2 or more relatives with breast AND/OR ovarian cancer No   2 or more relatives with breast AND/OR bowel cancer No     Mammogram Screening - Mammogram every 1-2 years updated in Health Maintenance based on mutual decision making        6/13/2024   STI Screening   New sexual partner(s) since last STI/HIV test? No     History of abnormal Pap smear: No - age 30- 64 PAP with HPV every 5 years recommended        Latest Ref Rng & Units 5/12/2023     2:50 PM 6/9/2016    12:00 AM   PAP / HPV   PAP  Negative for Intraepithelial Lesion or Malignancy (NILM)     HPV 16 DNA Negative Negative     HPV 18 DNA Negative Negative     HPV_EXT - HISTORICAL   See Scanned Report    Other HR HPV Negative Negative       ASCVD Risk   The 10-year ASCVD risk score (Elisabeth NOLAND, et al., 2019) is: 1.6%    Values used to calculate the score:      Age: 48 years      Sex: Female      Is Non- : No      Diabetic: Yes      Tobacco smoker: No      Systolic Blood Pressure: 127 mmHg      Is BP treated: No      HDL Cholesterol: 55 mg/dL      Total Cholesterol: 170 mg/dL        6/13/2024   Contraception/Family Planning   Questions about contraception or family planning No        Reviewed and updated as needed this visit by Provider   Tobacco  Allergies  Meds  Problems  Med Hx  " Surg Hx  Fam Hx             Objective    Exam  /69 (BP Location: Left arm, Patient Position: Sitting, Cuff Size: Adult Large)   Pulse 73   Temp 98.6  F (37  C) (Oral)   Resp 16   Ht 1.6 m (5' 3\")   Wt 94.2 kg (207 lb 11.2 oz)   LMP  (LMP Unknown)   SpO2 98%   Breastfeeding No   BMI 36.79 kg/m     Estimated body mass index is 36.79 kg/m  as calculated from the following:    Height as of this encounter: 1.6 m (5' 3\").    Weight as of this encounter: 94.2 kg (207 lb 11.2 oz).    Physical Exam  GENERAL: alert and no distress  EYES: Eyes grossly normal to inspection, PERRL and conjunctivae and sclerae normal  HENT: ear canals and TM's normal, nose and mouth without ulcers or lesions  NECK: no adenopathy, no asymmetry, masses, or scars  RESP: lungs clear to auscultation - no rales, rhonchi or wheezes  CV: regular rate and rhythm, normal S1 S2, no S3 or S4, no murmur, click or rub, no peripheral edema  ABDOMEN: soft, nontender, no hepatosplenomegaly, no masses and bowel sounds normal  MS: no gross musculoskeletal defects noted, no edema  SKIN: no suspicious lesions or rashes  NEURO: Normal strength and tone, mentation intact and speech normal  PSYCH: mentation appears normal, affect normal/bright    Signed Electronically by: SHERMAN Gonzalez CNP    "

## 2024-06-13 NOTE — ASSESSMENT & PLAN NOTE
Patient follows with rheumatology.  Specialty notes reviewed today.  She had excellent results with the [facility] steroid injections and plans to follow-up with rheumatology as needed.

## 2024-06-13 NOTE — ASSESSMENT & PLAN NOTE
Patient is managed by Dr. Melchor comprehensive weight management today.  She has successfully lost approximately 50 pounds.  Encouraged continued work with weight management and I encouraged her to reach out with any additional needs.  Updated metabolic labs were obtained today.

## 2024-06-13 NOTE — PATIENT INSTRUCTIONS
"Patient Education   Preventive Care Advice   This is general advice we often give to help people stay healthy. Your care team may have specific advice just for you. Please talk to your care team about your own preventive care needs.  Lifestyle  Exercise at least 150 minutes each week (30 minutes a day, 5 days a week).  Do muscle strengthening activities 2 days a week. These help control your weight and prevent disease.  No smoking.  Wear sunscreen to prevent skin cancer.  Have your home tested for radon every 2 to 5 years. Radon is a colorless, odorless gas that can harm your lungs. To learn more, go to www.health.Pending sale to [facility].[place].us and search for \"Radon in Homes.\"  Keep guns unloaded and locked up in a safe place like a safe or gun vault, or, use a gun lock and hide the keys. Always lock away bullets separately. To learn more, visit [employer].[place].gov and search for \"safe gun storage.\"  Nutrition  Eat 5 or more servings of fruits and vegetables each day.  Try wheat bread, brown rice and whole grain pasta (instead of white bread, rice, and pasta).  Get enough calcium and vitamin D. Check the label on foods and aim for 100% of the RDA (recommended daily allowance).  Regular exams  Have a dental exam and cleaning every 6 months.  See your health care team every year to talk about:  Any changes in your health.  Any medicines your care team has prescribed.  Preventive care, family planning, and ways to prevent chronic diseases.  Shots (vaccines)   HPV shots (up to age 26), if you've never had them before.  Hepatitis B shots (up to age 59), if you've never had them before.  COVID-19 shot: Get this shot when it's due.  Flu shot: Get a flu shot every year.  Tetanus shot: Get a tetanus shot every 10 years.  Pneumococcal, hepatitis A, and RSV shots: Ask your care team if you need these based on your risk.  Shingles shot (for age 50 and up).  General health tests  Diabetes screening:  Starting at age 35, Get screened for diabetes at least " every 3 years.  If you are younger than age 35, ask your care team if you should be screened for diabetes.  Cholesterol test: At age 39, start having a cholesterol test every 5 years, or more often if advised.  Bone density scan (DEXA): At age 50, ask your care team if you should have this scan for osteoporosis (brittle bones).  Hepatitis C: Get tested at least once in your life.  Abdominal aortic aneurysm screening: Talk to your doctor about having this screening if you:  Have ever smoked; and  Are biologically male; and  Are between the ages of 65 and 75.  STIs (sexually transmitted infections)  Before age 24: Ask your care team if you should be screened for STIs.  After age 24: Get screened for STIs if you're at risk. You are at risk for STIs (including HIV) if:  You are sexually active with more than one person.  You don't use condoms every time.  You or a partner was diagnosed with a sexually transmitted infection.  If you are at risk for HIV, ask about PrEP medicine to prevent HIV.  Get tested for HIV at least once in your life, whether you are at risk for HIV or not.  Cancer screening tests  Cervical cancer screening: If you have a cervix, begin getting regular cervical cancer screening tests at age 21. Most people who have regular screenings with normal results can stop after age 65. Talk about this with your provider.  Breast cancer scan (mammogram): If you've ever had breasts, begin having regular mammograms starting at age 40. This is a scan to check for breast cancer.  Colon cancer screening: It is important to start screening for colon cancer at age 45.  Have a colonoscopy test every 10 years (or more often if you're at risk) Or, ask your provider about stool tests like a FIT test every year or Cologuard test every 3 years.  To learn more about your testing options, visit: www.[employer]/133100.pdf.  For help making a decision, visit: lesa/ma78918.  Prostate cancer screening test: If you have a  prostate and are age 55 to 69, ask your provider if you would benefit from a yearly prostate cancer screening test.  Lung cancer screening: If you are a current or former smoker age 50 to 80, ask your care team if ongoing lung cancer screenings are right for you.  For informational purposes only. Not to replace the advice of your health care provider. Copyright   2023 [facility]. All rights reserved. Clinically reviewed by the [facility] Transitions Program. [employer] [id number] - REV 04/24.

## 2024-06-13 NOTE — ASSESSMENT & PLAN NOTE
Hemoglobin A1c today is 5.8, indicating excellent control of her diabetes on current regimen of metformin 500 mg twice daily and Wegovy. She denies any symptoms of hyperglycemia or hypoglycemia.  She did have to cancel her recent eye exam, but plans to reschedule.  Utilizes a continuous glucose monitor and I have sent in a refill on this today.  Foot exam today was completed and without findings.  She is on a cholesterol-lowering medication.  She is not currently on an ACE inhibitor or ARB.  Will plan on medication check in 6 months.  Patient expressed understanding of and agreement this plan.  All questions were answered.

## 2024-06-14 LAB
ANION GAP SERPL CALCULATED.3IONS-SCNC: 12 MMOL/L (ref 7–15)
BUN SERPL-MCNC: 16.1 MG/DL (ref 6–20)
CALCIUM SERPL-MCNC: 9.5 MG/DL (ref 8.6–10)
CHLORIDE SERPL-SCNC: 103 MMOL/L (ref 98–107)
CHOLEST SERPL-MCNC: 150 MG/DL
CREAT SERPL-MCNC: 0.67 MG/DL (ref 0.51–0.95)
DEPRECATED HCO3 PLAS-SCNC: 26 MMOL/L (ref 22–29)
EGFRCR SERPLBLD CKD-EPI 2021: >90 ML/MIN/1.73M2
GLUCOSE SERPL-MCNC: 96 MG/DL (ref 70–99)
HDLC SERPL-MCNC: 53 MG/DL
LDLC SERPL CALC-MCNC: 57 MG/DL
NONHDLC SERPL-MCNC: 97 MG/DL
POTASSIUM SERPL-SCNC: 4.1 MMOL/L (ref 3.4–5.3)
SODIUM SERPL-SCNC: 141 MMOL/L (ref 135–145)
TRIGL SERPL-MCNC: 198 MG/DL
VIT D+METAB SERPL-MCNC: 28 NG/ML (ref 20–50)

## 2024-07-19 ENCOUNTER — TRANSFERRED RECORDS (OUTPATIENT)
Dept: HEALTH INFORMATION MANAGEMENT | Facility: CLINIC | Age: 49
End: 2024-07-19
Payer: COMMERCIAL

## 2024-09-29 ENCOUNTER — HEALTH MAINTENANCE LETTER (OUTPATIENT)
Age: 49
End: 2024-09-29

## 2024-10-28 DIAGNOSIS — E11.9 TYPE 2 DIABETES MELLITUS WITHOUT COMPLICATION, WITHOUT LONG-TERM CURRENT USE OF INSULIN (H): ICD-10-CM

## 2024-10-28 NOTE — TELEPHONE ENCOUNTER
Medication Request  Medication name: Continuous Glucose Sensor (FREESTYLE HELEN 2 SENSOR) [facility]   [place] [id number]  When was patient last seen for this?:  6/13/24  Patient offered appointment:  N/A pharmacy sent request  Okay to leave a detailed message: no

## 2024-11-05 NOTE — PROGRESS NOTES
PLAN  Follow up in 1 month.  This month concentrate on journaling and increasing exercise    Prediabetes  Begin journaling as previous.  Discussed increasing exercise.  Will reinstitute metformin as well as phentermine.  Follow-up in 1 month for re-check.        Initial Weight: 235 lbs  Weight: 230 lb 1.6 oz (104.4 kg)  Weight loss from initial: 4.9  % Weight loss: 2.09 %    Are you experiencing any side effects to the medications:  No  Hunger control:  Poor, as such will reinstitute medication to aid  Exercise was discussed: yes  Taking supplements:  yes  Discussed journaling food:  yes  Patient is pleased with the current results:  no, she did go back to the class again for the reeducation.  However patient is not keeping up in the sleep side.  She is only averaging about 6-1/2-6 hours of sleep per night.  We discussed avenues for bringing that back to 8 hours nightly.  The patient is following the nutrition plan:  no, not following appropriate sleep hygiene  Barriers to losing weight:  Behavior:  inadequate exercise    Vitals:    10/29/18 1616   BP: 118/72   Pulse: 64   Resp: 12   Temp: 98.4  F (36.9  C)     General: Alert and oriented ×3  Heart: Regular rate and rhythm without murmur or gallop  Lungs: Clear to auscultation bilateral  Extremities: No cyanosis or edema     Physical Therapy Visit      Visit: 6  Referring Provider: Louie Dewitt MD  Next referring provider visit: 11/26/2024  Medical Diagnosis (from order): M75.42 - Impingement syndrome of left shoulder  M25.512 - Pain in left shoulder   Patient alert and oriented X3.    SUBJECTIVE                                                                                                               11/5/24  He states still having one spot on the shoulder.  He states the radiating pain he gets when he stretches.  The pain is over the area that started the process.  He states getting up and down from the floor without any pain and is able to reach overhead without discomfort.  He just gets and occasional pain at end range flexion.      Initial eval: Patient reports to physical therapy with complaints of left shoulder pain following debridement and acromioplasty on 10/3/24.  He states the doctor instructed him to discharge the sling early.  He states the shoulder is moving well but is still a little sore.  He reports being able to get dressed without much difficulty.  He is taking celebrex and has oxycodone but is not taking it.  He is doing everyday activities but has discomfort with getting dressed.      Pain / Symptoms  - Pain rating (out of 10): Current: 2 ; Best: 2; Worst: 5    Patient Goals: decreased pain and independence with ADLs/IADLs.      OBJECTIVE                                                                                                                     Range of Motion (ROM)   (degrees unless noted; active unless noted; norms in ( ); negative=lacking to 0, positive=beyond 0)  Shoulder:    - Flexion (180):         Left:148     Passive: 168         Right: 155   Passive: 172    - Abduction (180):         Left: 122   Passive: 165         Right: 169   Passive: 175    - Internal Rotation:        - at 90° (70-90):             Left:   Passive: 15        - Behind Back:            Left: T12            Right: T9    -  External Rotation:       - at 90° (90):             Left: Passive: 24       - Behind the Head:            Left: T3             Right: T4          Palpation  Point tenderness over the left shoulder at the subacromial space                 Treatment     Therapeutic Exercise        Manual Therapy   GH mobs grade 3 post / inf glides  Grade 2 anterior capsule mobs in prone    Therapeutic Activity  Rows / pull downs 15x2 black band  External rotation with abduction lt green 10x2  Ball walks up wall 10s hold x 10  Arm circles in supine with 1# x 15 CW/CCW  RS  of the shoulder in supine 1 min x 2  Prone extension, horizontal abduction, flexion 1# 20x2 ea  Sidelying shoulder external rotation / abduction 1# 20x2 ea      Not today  Table pushups on ball 2x10  [employer] mountain climbers 30s x 2  Knee plank walk overs 10x2  Throwing a softball 15 feet    Skilled input: verbal instruction/cues, tactile instruction/cues and posture correction    Writer verbally educated and received verbal consent for hand placement, positioning of patient, and techniques to be performed today from patient   Home Exercise Program  Access Code: [email or web address]  URL: [email or web address]  Date: 10/23/2024  Prepared by: Cliff Mullen    Exercises  - Supine Shoulder Protraction with Dowel  - 1 x daily - 7 x weekly - 1 sets - 20 reps - 2 s hold  - Supine Shoulder Flexion Extension AAROM with Dowel  - 1 x daily - 7 x weekly - 1 sets - 20 reps - 2 s hold  - Supine Shoulder External Rotation in 45 Degrees Abduction AAROM with Dowel  - 1 x daily - 7 x weekly - 1 sets - 10 reps - 10 s hold  - Standing shoulder flexion wall slides  - 1 x daily - 7 x weekly - 2 sets - 10 reps - 10 s hold  - Doorway Pec Stretch at 90 Degrees Abduction  - 1 x daily - 7 x weekly - 1 sets - 5 reps - 10 s hold  - Standing Shoulder Extension with Dowel  - 1 x daily - 7 x weekly - 1 sets - 20 reps - 2 s hold  - Standing Shoulder Internal Rotation AAROM with Dowel  - 1  x daily - 7 x weekly - 1 sets - 10 reps - 5 s hold  - Seated Assisted Cervical Rotation with Towel  - 1 x daily - 7 x weekly - 1 sets - 5 reps - 10 s hold  - Standing Shoulder Row with Anchored Resistance  - 1 x daily - 7 x weekly - 3 sets - 10 reps  - Shoulder extension with resistance - Neutral  - 1 x daily - 7 x weekly - 3 sets - 10 reps      ASSESSMENT                                                                                                            11/5/24  Jovani presents functional shoulder range of motion for ADLs.  He still presents with weakness with external rotation and abduction with complains of mild impingement at end range flexion.  Pain is slowly decreasing with activities.  Therapy will continue with scapular stability and upper extremity strengthening.    Initial Eval: Patient is a 57 y.o. male s/p left shoulder decompression and debridement of the AC and subacromial space.  He presents with limited shoulder mobility with mild discomfort at end range flexion and abduction.  He also presents with general weakness in the shoulder and scapular stabilizers.  He would benefit from shoulder range of motion and progressive strengthening of the left shoulder for return to normal function.  Pain/symptoms after session (out of 10): 2  Education:   - Present and ready to learn: patient  - Results of above outlined education: Verbalizes understanding and Demonstrates understanding    PLAN                                                                                                                           Suggestions for next session as indicated: Progress per plan of care    Goals  Long Term Goals: to be met by end of plan of care  1. Patient will demonstrate full shoulder range of motion without complains of pain  2. Patient will demonstrate proper body mechanics for lifting and reaching  3. Patient will report bathing and washing without complains of increased pain.   4. Patient will return to  recreational activities and throwing without limitations  5. Patient will report independence with all ADLs    Therapy procedure time and total treatment time can be found documented on the Time Entry flowsheet

## 2024-11-26 ENCOUNTER — LAB (OUTPATIENT)
Dept: LAB | Facility: HOSPITAL | Age: 49
End: 2024-11-26
Payer: COMMERCIAL

## 2024-11-26 DIAGNOSIS — E11.9 TYPE 2 DIABETES MELLITUS WITHOUT COMPLICATION, WITHOUT LONG-TERM CURRENT USE OF INSULIN (H): ICD-10-CM

## 2024-11-26 LAB
CREAT UR-MCNC: 69.3 MG/DL
EST. AVERAGE GLUCOSE BLD GHB EST-MCNC: 131 MG/DL
HBA1C MFR BLD: 6.2 %
MICROALBUMIN UR-MCNC: <12 MG/L
MICROALBUMIN/CREAT UR: NORMAL MG/G{CREAT}

## 2024-11-26 PROCEDURE — 82570 ASSAY OF URINE CREATININE: CPT

## 2024-11-26 PROCEDURE — 82043 UR ALBUMIN QUANTITATIVE: CPT

## 2024-11-26 PROCEDURE — 36415 COLL VENOUS BLD VENIPUNCTURE: CPT

## 2024-11-26 PROCEDURE — 83036 HEMOGLOBIN GLYCOSYLATED A1C: CPT

## 2024-11-27 ENCOUNTER — OFFICE VISIT (OUTPATIENT)
Dept: RHEUMATOLOGY | Facility: CLINIC | Age: 49
End: 2024-11-27
Payer: COMMERCIAL

## 2024-11-27 VITALS
DIASTOLIC BLOOD PRESSURE: 91 MMHG | WEIGHT: 205.7 LBS | OXYGEN SATURATION: 99 % | SYSTOLIC BLOOD PRESSURE: 143 MMHG | HEART RATE: 75 BPM | BODY MASS INDEX: 36.44 KG/M2

## 2024-11-27 DIAGNOSIS — M18.0 PRIMARY OSTEOARTHRITIS OF BOTH FIRST CARPOMETACARPAL JOINTS: ICD-10-CM

## 2024-11-27 DIAGNOSIS — M25.50 HYPERMOBILITY ARTHRALGIA: ICD-10-CM

## 2024-11-27 DIAGNOSIS — M15.0 PRIMARY OSTEOARTHRITIS INVOLVING MULTIPLE JOINTS: Primary | ICD-10-CM

## 2024-11-27 DIAGNOSIS — M25.50 POLYARTHRALGIA: ICD-10-CM

## 2024-11-27 PROCEDURE — G2211 COMPLEX E/M VISIT ADD ON: HCPCS | Performed by: INTERNAL MEDICINE

## 2024-11-27 PROCEDURE — 99214 OFFICE O/P EST MOD 30 MIN: CPT | Performed by: INTERNAL MEDICINE

## 2024-11-27 NOTE — PROGRESS NOTES
Rheumatology follow-up visit note     Mariluz is a 49 year old female presents today for follow-up.    Mariluz was seen today for recheck.    Diagnoses and all orders for this visit:    Primary osteoarthritis involving multiple joints    Hypermobility arthralgia    Polyarthralgia    Primary osteoarthritis of both first carpometacarpal joints        This patient has arthralgias under control with over-the-counter measures, corticosteroid injections intermittently, duloxetine did not work for her.  She would like to come back in 6 months or sooner if needed.  The longitudinal plan of care for the diagnosis(es)/condition(s) as documented were addressed during this visit. Due to the added complexity in care, I will continue to support Mariluz in the subsequent management and with ongoing continuity of care.    Follow up in 6 months.    HPI    Mariluz Duron is a 49 year old female is here for follow-up of polyarthralgias.  She has osteoarthritis which is widespread.  She has not had any good success with duloxetine as noted on her previous visit that is been discontinued.  She is now on week OV.  She has lost 60 pounds.  She is altered her diet as well as in view of the above and the diabetes.  She noted excellent response to corticosteroid injection at the first [facility] done on her previous visit.  She has some discomfort he had.  She takes Aleve intermittently will ask her to go for acetaminophen sustained-release instead and see if that provides her with relief.  She has the option of topical diclofenac.    No history of trauma.  In the past corticosteroid injection have been helpful in one of her DIPs.  She has hypermobility likely risk factor and perhaps explaining why she has such premature OA at this age.     DETAILED EXAMINATION  11/27/24  :    Vitals:    11/27/24 0957 11/27/24 1000   BP: (!) 142/86 (!) 143/91   BP Location: Right arm Left arm   Pulse: 75    SpO2: 99%    Weight: 93.3 kg (205 lb 11.2 oz)       Alert oriented. Head including the face is examined for malar rash, heliotropes, scarring, lupus pernio. Eyes examined for redness such as in episcleritis/scleritis, periorbital lesions.   Neck/ Face examined for parotid gland swelling, range of motion of neck.  Left upper and lower and right upper and lower extremities examined for tenderness, swelling, warmth of the appendicular joints, range of motion, edema, rash.  Some of the important findings included: she does not have evidence of synovitis in the palpable joints of the upper extremities.  No significant deformities of the digits.  + Heberden nodes.  Range of motion of the shoulders  show full abduction.  No JLT effusion or warmth of the knees.  she does not have dactylitis of the digits.     Patient Active Problem List    Diagnosis Date Noted    Tendinitis of right hip flexor 01/12/2024     Priority: Medium    Routine general medical examination at a health care facility 05/12/2023     Priority: Medium    Type 2 diabetes mellitus without complication, without long-term current use of insulin (H) 05/12/2023     Priority: Medium    Hypermobility arthralgia 02/06/2018     Priority: Medium    Herpes Simplex Acute Gingivostomatitis      Priority: Medium    Avitaminosis D 08/26/2016     Priority: Medium    History of gestational diabetes 08/25/2016     Priority: Medium    Gastroesophageal reflux disease without esophagitis 08/25/2016     Priority: Medium    Class 2 severe obesity due to excess calories with serious comorbidity and body mass index (BMI) of 35.0 to 35.9 in adult (H) 08/25/2016     Priority: Medium    Polyarthritis Of The Hand      Priority: Medium    Generalized Osteoarthritis Of The Hand      Priority: Medium    Osteoarthritis of multiple joints      Priority: Medium    Joint Pain Fingers      Priority: Medium     Past Surgical History:   Procedure Laterality Date    ABDOMEN SURGERY  ?    I had an ovarian cyst removed and i also did IVF     ARTHROSCOPIC REPAIR ACL      2016    BIOPSY      When i had my colonoscopy    COLONOSCOPY N/A 10/12/2023    Procedure: COLONOSCOPY WITH POLYPECTOMY X1;  Surgeon: Willam Ansari MD;  Location: [facility] OR    GENITOURINARY SURGERY  2009?    Ovarian cyst removed    LASER ABLATION      ORTHOPEDIC SURGERY  6+ years ago    ACL surgery    OVARIAN CYST REMOVAL        Past Medical History:   Diagnosis Date    Arthritis 5+ years ago    Diabetes (H) 5-12-23    Dysuria 05/12/2023    Elevated blood pressure reading in office without diagnosis of hypertension 05/12/2023    Gastroesophageal reflux disease     High risk medication use 08/18/2016    Hypertension 5-11-23     No Known Allergies  Current Outpatient Medications   Medication Sig Dispense Refill    aspirin 81 MG EC tablet Take 1 tablet (81 mg) by mouth daily      blood glucose (NO BRAND SPECIFIED) lancets standard Use to test blood sugar three times daily or as directed. 300 each 3    blood glucose (NO BRAND SPECIFIED) test strip Use to test blood sugar three times daily or as directed. 300 strip 3    blood glucose monitoring (NO BRAND SPECIFIED) meter device kit Use to test blood sugar 2 times daily or as directed. 1 kit 0    Continuous Glucose Sensor (FREESTYLE HELEN 2 SENSOR) [facility] CHANGE SENSOR EVERY 14 DAYS AS DIRECTED. 2 each 11    metFORMIN (GLUCOPHAGE) 500 MG tablet Take 1 tablet (500 mg) by mouth 2 times daily (with meals) 360 tablet 1    multivitamin w/minerals (MULTI-VITAMIN) tablet Take 1 tablet by mouth daily      Semaglutide-Weight Management (WEGOVY) 2.4 MG/0.75ML pen Inject 2.4 mg Subcutaneous once a week 9 mL 3    simvastatin (ZOCOR) 40 MG tablet Take 1 tablet (40 mg) by mouth at bedtime 90 tablet 3    valACYclovir (VALTREX) 1000 mg tablet [VALACYCLOVIR (VALTREX) 1000 MG TABLET] TAKE 2 TABLETS BY MOUTH 1 TIME. REPEAT WITH 2 TABLETS IN 12 HOURS 30 tablet 1     family history includes Anxiety Disorder in her mother; Coronary Artery Disease in her  mother; Diabetes in her mother; Heart Disease in her paternal grandmother; Hyperlipidemia in her father; Hypertension in her father; No Known Problems in her daughter and son; Obesity in her brother, father, and mother; Osteoporosis in her mother; Sleep Apnea in her mother.  Social Connections: Unknown (6/13/2024)    Social Connection and Isolation Panel [NHANES]     Frequency of Communication with Friends and Family: Not on file     Frequency of Social Gatherings with Friends and Family: Once a week     Attends Christian Services: Not on file     Active Member of Clubs or Organizations: Not on file     Attends Club or Organization Meetings: Not on file     Marital Status: Not on file          WBC Count   Date Value Ref Range Status   05/12/2023 7.9 4.0 - 11.0 10e3/uL Final     RBC Count   Date Value Ref Range Status   05/12/2023 5.04 3.80 - 5.20 10e6/uL Final     Hemoglobin   Date Value Ref Range Status   05/12/2023 15.2 11.7 - 15.7 g/dL Final     Hematocrit   Date Value Ref Range Status   05/12/2023 43.8 35.0 - 47.0 % Final     MCV   Date Value Ref Range Status   05/12/2023 87 78 - 100 fL Final     MCH   Date Value Ref Range Status   05/12/2023 30.2 26.5 - 33.0 pg Final     Platelet Count   Date Value Ref Range Status   05/12/2023 256 150 - 450 10e3/uL Final     AST   Date Value Ref Range Status   05/12/2023 22 10 - 35 U/L Final     ALT   Date Value Ref Range Status   05/12/2023 31 10 - 35 U/L Final     Albumin   Date Value Ref Range Status   05/12/2023 4.1 3.5 - 5.2 g/dL Final   01/02/2019 3.8 3.5 - 5.0 g/dL Final     Alkaline Phosphatase   Date Value Ref Range Status   05/12/2023 99 35 - 104 U/L Final     Creatinine   Date Value Ref Range Status   06/13/2024 0.67 0.51 - 0.95 mg/dL Final     GFR Estimate   Date Value Ref Range Status   06/13/2024 >90 >60 mL/min/1.73m2 Final     Comment:     eGFR calculated using 2021 CKD-EPI equation.   01/02/2019 >60 >60 mL/min/1.73m2 Final     GFR Estimate If Black   Date Value  Ref Range Status   01/02/2019 >60 >60 mL/min/1.73m2 Final     Creatinine Urine mg/dL   Date Value Ref Range Status   11/26/2024 69.3 mg/dL Final     Comment:     The reference ranges have not been established in urine creatinine. The results should be integrated into the clinical context for interpretation.     Erythrocyte Sedimentation Rate   Date Value Ref Range Status   09/14/2023 20 0 - 20 mm/hr Final

## 2024-12-26 ENCOUNTER — PATIENT OUTREACH (OUTPATIENT)
Dept: CARE COORDINATION | Facility: CLINIC | Age: 49
End: 2024-12-26
Payer: COMMERCIAL

## 2025-01-09 ENCOUNTER — ANCILLARY PROCEDURE (OUTPATIENT)
Dept: MAMMOGRAPHY | Facility: CLINIC | Age: 50
End: 2025-01-09
Payer: COMMERCIAL

## 2025-01-09 DIAGNOSIS — Z12.31 ENCOUNTER FOR SCREENING MAMMOGRAM FOR BREAST CANCER: ICD-10-CM

## 2025-01-09 PROCEDURE — 77063 BREAST TOMOSYNTHESIS BI: CPT

## 2025-01-09 PROCEDURE — 77067 SCR MAMMO BI INCL CAD: CPT

## 2025-02-11 ENCOUNTER — MYC MEDICAL ADVICE (OUTPATIENT)
Dept: FAMILY MEDICINE | Facility: CLINIC | Age: 50
End: 2025-02-11
Payer: COMMERCIAL

## 2025-02-11 DIAGNOSIS — E66.01 CLASS 2 SEVERE OBESITY DUE TO EXCESS CALORIES WITH SERIOUS COMORBIDITY AND BODY MASS INDEX (BMI) OF 37.0 TO 37.9 IN ADULT (H): Primary | ICD-10-CM

## 2025-02-11 DIAGNOSIS — E11.9 TYPE 2 DIABETES MELLITUS WITHOUT COMPLICATION, WITHOUT LONG-TERM CURRENT USE OF INSULIN (H): ICD-10-CM

## 2025-02-11 DIAGNOSIS — E66.812 CLASS 2 SEVERE OBESITY DUE TO EXCESS CALORIES WITH SERIOUS COMORBIDITY AND BODY MASS INDEX (BMI) OF 37.0 TO 37.9 IN ADULT (H): Primary | ICD-10-CM

## 2025-03-03 ENCOUNTER — OFFICE VISIT (OUTPATIENT)
Dept: URGENT CARE | Facility: URGENT CARE | Age: 50
End: 2025-03-03
Payer: COMMERCIAL

## 2025-03-03 VITALS
SYSTOLIC BLOOD PRESSURE: 134 MMHG | OXYGEN SATURATION: 97 % | RESPIRATION RATE: 16 BRPM | BODY MASS INDEX: 36.31 KG/M2 | WEIGHT: 205 LBS | TEMPERATURE: 99.2 F | DIASTOLIC BLOOD PRESSURE: 87 MMHG | HEART RATE: 95 BPM

## 2025-03-03 DIAGNOSIS — J02.9 SORE THROAT: Primary | ICD-10-CM

## 2025-03-03 LAB
DEPRECATED S PYO AG THROAT QL EIA: NEGATIVE
S PYO DNA THROAT QL NAA+PROBE: NOT DETECTED

## 2025-03-03 PROCEDURE — 87651 STREP A DNA AMP PROBE: CPT | Performed by: PHYSICIAN ASSISTANT

## 2025-03-03 PROCEDURE — 3075F SYST BP GE 130 - 139MM HG: CPT | Performed by: PHYSICIAN ASSISTANT

## 2025-03-03 PROCEDURE — 99213 OFFICE O/P EST LOW 20 MIN: CPT | Performed by: PHYSICIAN ASSISTANT

## 2025-03-03 PROCEDURE — 3079F DIAST BP 80-89 MM HG: CPT | Performed by: PHYSICIAN ASSISTANT

## 2025-03-03 ASSESSMENT — ENCOUNTER SYMPTOMS
HEADACHES: 1
ADENOPATHY: 1
FEVER: 1
GASTROINTESTINAL NEGATIVE: 1
SORE THROAT: 1
COUGH: 0

## 2025-03-03 NOTE — PROGRESS NOTES
Patient presents with:  Throat Problem: Started with sore throat and fever yesterday      Clinical Decision Making:  Sore throat for the past day.  Rapid strep test negative.  Throat exam exhibits postnasal drainage, but no asymmetry concerning for peritonsillar abscess or mass effect.  Influenza and COVID test offered, but declined.  Patient will treat symptoms supportively.      ICD-10-CM    1. Sore throat  J02.9 Streptococcus A Rapid Screen w/Reflex to PCR - Clinic Collect     Group A Streptococcus PCR Throat Swab          There are no Patient Instructions on file for this visit.    HPI:  Mariluz Duron is a 49 year old female who presents today with concerns of ST and fever that started yesterday.  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  She is interested in strep testing today.    History obtained from the patient.    Problem List:  2024-01: Tendinitis of right hip flexor  2023-05: Routine general medical examination at a health care facility  2023-05: Dysuria  2023-05: Type 2 diabetes mellitus without complication, without long-  term current use of insulin (H)  2023-05: Elevated blood pressure reading in office without diagnosis   of hypertension  2018-02: Hypermobility arthralgia  2016-08: Avitaminosis D  2016-08: History of gestational diabetes  2016-08: Gastroesophageal reflux disease without esophagitis  2016-08: Class 2 severe obesity due to excess calories with serious   comorbidity and body mass index (BMI) of 37.0 to 37.9 in adult (H)  2016-08: High risk medication use  Prediabetes  Herpes Simplex Acute Gingivostomatitis  Joint Pain Fingers  Polyarthritis Of The Hand  Osteoarthritis of multiple joints  Generalized Osteoarthritis Of The Hand      Past Medical History:   Diagnosis Date    Arthritis 5+ years ago    Diabetes (H) 5-12-23    Dysuria 05/12/2023    Elevated blood pressure reading in office without diagnosis of hypertension 05/12/2023     Gastroesophageal reflux disease     High risk medication use 08/18/2016    Hypertension 5-11-23       Social History     Tobacco Use    Smoking status: Former     Current packs/day: 0.00     Average packs/day: 1.5 packs/day for 18.0 years (27.0 ttl pk-yrs)     Types: Cigarettes     Start date: 5/1/1991     Quit date: 5/1/2009     Years since quitting: 15.8     Passive exposure: Past    Smokeless tobacco: Never   Substance Use Topics    Alcohol use: Yes     Comment: Alcoholic Drinks/day: occasionally         Review of Systems   Constitutional:  Positive for fever.   HENT:  Positive for sore throat.    Respiratory:  Negative for cough.    Gastrointestinal: Negative.    Neurological:  Positive for headaches.   Hematological:  Positive for adenopathy.       Vitals:    03/03/25 1134 03/03/25 1139   BP: (!) 143/84 134/87   Pulse: 95    Resp: 16    Temp: 99.2  F (37.3  C)    TempSrc: Oral    SpO2: 97%    Weight: 93 kg (205 lb)        Physical Exam  Vitals and nursing note reviewed.   Constitutional:       General: She is not in acute distress.     Appearance: She is not toxic-appearing or diaphoretic.   HENT:      Head: Normocephalic and atraumatic.      Mouth/Throat:      Mouth: Mucous membranes are moist.      Pharynx: Posterior oropharyngeal erythema present. No oropharyngeal exudate.      Comments: Postnasal drainage noted on throat exam  Eyes:      Conjunctiva/sclera: Conjunctivae normal.   Cardiovascular:      Rate and Rhythm: Normal rate and regular rhythm.   Pulmonary:      Effort: Pulmonary effort is normal.   Neurological:      Mental Status: She is alert.   Psychiatric:         Mood and Affect: Mood normal.         Behavior: Behavior normal.         Thought Content: Thought content normal.         Judgment: Judgment normal.         Results:  Results for orders placed or performed in visit on 03/03/25   Streptococcus A Rapid Screen w/Reflex to PCR - Clinic Collect     Status: Normal    Specimen: Throat; Swab    Result Value Ref Range    Group A Strep antigen Negative Negative         At the end of the encounter, I discussed results, diagnosis, medications. Discussed red flags for immediate return to clinic/ER, as well as indications for follow up if no improvement. Patient understood and agreed to plan. Patient was stable for discharge.

## 2025-03-03 NOTE — LETTER
March 3, 2025      Mariluz Duron  [address] [place] [address]        To Whom It May Concern:    Mariluz Duron  was seen on 3/3/2025.  Please excuse her absence from work due to illness today.         Sincerely,        Lindsay Tripathi PA-C    Electronically signed

## 2025-03-15 ENCOUNTER — HEALTH MAINTENANCE LETTER (OUTPATIENT)
Age: 50
End: 2025-03-15

## 2025-03-21 ENCOUNTER — TRANSFERRED RECORDS (OUTPATIENT)
Dept: HEALTH INFORMATION MANAGEMENT | Facility: CLINIC | Age: 50
End: 2025-03-21
Payer: COMMERCIAL

## 2025-04-18 ENCOUNTER — TRANSFERRED RECORDS (OUTPATIENT)
Dept: HEALTH INFORMATION MANAGEMENT | Facility: CLINIC | Age: 50
End: 2025-04-18
Payer: COMMERCIAL

## 2025-05-06 ENCOUNTER — E-VISIT (OUTPATIENT)
Dept: FAMILY MEDICINE | Facility: CLINIC | Age: 50
End: 2025-05-06
Payer: COMMERCIAL

## 2025-05-06 ENCOUNTER — LAB (OUTPATIENT)
Dept: LAB | Facility: HOSPITAL | Age: 50
End: 2025-05-06
Payer: COMMERCIAL

## 2025-05-06 DIAGNOSIS — R30.0 DYSURIA: Primary | ICD-10-CM

## 2025-05-06 DIAGNOSIS — N39.0 ACUTE UTI (URINARY TRACT INFECTION): ICD-10-CM

## 2025-05-06 DIAGNOSIS — R30.0 DYSURIA: ICD-10-CM

## 2025-05-06 LAB
ALBUMIN UR-MCNC: 20 MG/DL
APPEARANCE UR: ABNORMAL
BACTERIA #/AREA URNS HPF: ABNORMAL /HPF
BILIRUB UR QL STRIP: NEGATIVE
CLUE CELLS: NORMAL
COLOR UR AUTO: YELLOW
GLUCOSE UR STRIP-MCNC: NEGATIVE MG/DL
HGB UR QL STRIP: ABNORMAL
KETONES UR STRIP-MCNC: NEGATIVE MG/DL
LEUKOCYTE ESTERASE UR QL STRIP: ABNORMAL
MUCOUS THREADS #/AREA URNS LPF: PRESENT /LPF
NITRATE UR QL: NEGATIVE
PH UR STRIP: 5.5 [PH] (ref 5–7)
RBC URINE: 16 /HPF
SP GR UR STRIP: 1.02 (ref 1–1.03)
SQUAMOUS EPITHELIAL: 2 /HPF
TRICHOMONAS, WET PREP: NORMAL
UROBILINOGEN UR STRIP-MCNC: NORMAL MG/DL
WBC URINE: >182 /HPF
WBC'S/HIGH POWER FIELD, WET PREP: NORMAL
YEAST, WET PREP: NORMAL

## 2025-05-06 PROCEDURE — 87186 SC STD MICRODIL/AGAR DIL: CPT

## 2025-05-06 PROCEDURE — 87210 SMEAR WET MOUNT SALINE/INK: CPT

## 2025-05-06 PROCEDURE — 81001 URINALYSIS AUTO W/SCOPE: CPT

## 2025-05-06 RX ORDER — NITROFURANTOIN 25; 75 MG/1; MG/1
100 CAPSULE ORAL 2 TIMES DAILY
Qty: 10 CAPSULE | Refills: 0 | Status: SHIPPED | OUTPATIENT
Start: 2025-05-06 | End: 2025-05-11

## 2025-05-06 NOTE — ADDENDUM NOTE
Addended by: MIRIAM STREETER on: 5/6/2025 01:02 PM     Modules accepted: Orders, Level of Service

## 2025-05-07 LAB — BACTERIA UR CULT: ABNORMAL

## 2025-05-08 ENCOUNTER — RESULTS FOLLOW-UP (OUTPATIENT)
Dept: FAMILY MEDICINE | Facility: CLINIC | Age: 50
End: 2025-05-08

## 2025-05-15 ENCOUNTER — TELEPHONE (OUTPATIENT)
Dept: FAMILY MEDICINE | Facility: CLINIC | Age: 50
End: 2025-05-15

## 2025-05-15 NOTE — TELEPHONE ENCOUNTER
Contacts       Contact Date/Time Type Contact Phone/Fax    05/15/2025 02:20 PM CDT Phone (Outgoing) Mariluz Duron (Self) [phone] (M)    No Answer/Busy           Attempted to reach patient to: Schedule an appointment    When patient returns call, please take this action: Assist with scheduling    Reason for the visit: Chronic Disease Management/Medication/Follow-up    When to schedule: Any 20-min reserved slot or held slot for medical weight management in June.    Additional comments/info: Patient's insurance will no longer cover Zepbound as of 7/1/25. Patient will need visit to discuss treatment plan.    If unable to schedule: Transfer to TC line (or update telephone encounter in after-hours)    [employer] message sent.

## 2025-05-27 ENCOUNTER — OFFICE VISIT (OUTPATIENT)
Dept: RHEUMATOLOGY | Facility: CLINIC | Age: 50
End: 2025-05-27
Payer: COMMERCIAL

## 2025-05-27 VITALS
SYSTOLIC BLOOD PRESSURE: 128 MMHG | BODY MASS INDEX: 35.96 KG/M2 | RESPIRATION RATE: 21 BRPM | HEART RATE: 70 BPM | OXYGEN SATURATION: 99 % | DIASTOLIC BLOOD PRESSURE: 82 MMHG | WEIGHT: 203 LBS

## 2025-05-27 DIAGNOSIS — M18.0 PRIMARY OSTEOARTHRITIS OF BOTH FIRST CARPOMETACARPAL JOINTS: Primary | ICD-10-CM

## 2025-05-27 DIAGNOSIS — M15.0 PRIMARY OSTEOARTHRITIS INVOLVING MULTIPLE JOINTS: ICD-10-CM

## 2025-05-27 DIAGNOSIS — M25.50 POLYARTHRALGIA: ICD-10-CM

## 2025-05-27 DIAGNOSIS — M25.50 HYPERMOBILITY ARTHRALGIA: ICD-10-CM

## 2025-05-27 PROCEDURE — 20604 DRAIN/INJ JOINT/BURSA W/US: CPT | Mod: FA | Performed by: INTERNAL MEDICINE

## 2025-05-27 PROCEDURE — 3074F SYST BP LT 130 MM HG: CPT | Performed by: INTERNAL MEDICINE

## 2025-05-27 PROCEDURE — 99214 OFFICE O/P EST MOD 30 MIN: CPT | Mod: 25 | Performed by: INTERNAL MEDICINE

## 2025-05-27 PROCEDURE — 3079F DIAST BP 80-89 MM HG: CPT | Performed by: INTERNAL MEDICINE

## 2025-05-27 RX ORDER — TRIAMCINOLONE ACETONIDE 40 MG/ML
20 INJECTION, SUSPENSION INTRA-ARTICULAR; INTRAMUSCULAR ONCE
Status: COMPLETED | OUTPATIENT
Start: 2025-05-27 | End: 2025-05-27

## 2025-05-27 RX ADMIN — TRIAMCINOLONE ACETONIDE 20 MG: 40 INJECTION, SUSPENSION INTRA-ARTICULAR; INTRAMUSCULAR at 12:18

## 2025-05-27 NOTE — PROGRESS NOTES
Rheumatology follow-up visit note     Mariluz is a 49 year old female presents today for follow-up.    Mariluz was seen today for recheck.    Diagnoses and all orders for this visit:    Primary osteoarthritis of both first carpometacarpal joints  -     [place] ARTHROCNT ASPIR&/INJ SMALL JT/BURSAW/US REC RPRT  -     triamcinolone (KENALOG-40) injection 20 mg  -     triamcinolone (KENALOG-40) injection 20 mg    Primary osteoarthritis involving multiple joints    Hypermobility arthralgia    Polyarthralgia            After pros and cons were discussed including risk of infection, bleeding, skin changes including thinning, pigmentary alteration and scarring to name a few,1st CMC bilateral  injected under US as noted in the orders section. This was done with nontouch technique.  The patient tolerated the procedure well and had a brisk Marcaine effect.  The postinjection care was discussed.      HPI    Mariluz Duron is a 49 year old female is here for follow-up of   polyarthralgias.  She has osteoarthritis which is widespread.  She has not had any good success with duloxetine as noted on her previous visit that was discontinued.  Day-to-day activities are impaired by the first CMC area pain.  She does not have family history of this.  The right hand is worse than the left.  There is no nocturnal issue.  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.     No history of trauma.  In the past corticosteroid injection have been helpful in one of her DIPs.  She has hypermobility likely risk factor and perhaps explaining why she has such premature OA at this age.         DETAILED EXAMINATION  05/27/25  :    Vitals:    05/27/25 1140   BP: 128/82   BP Location: Right arm   Patient Position: Sitting   Cuff Size: Adult Regular   Pulse: 70   Resp: 21   SpO2: 99%   Weight: 92.1 kg (203 lb)     Alert oriented. Head including the face is examined for malar rash, heliotropes, scarring, lupus pernio. Eyes examined for redness  such as in episcleritis/scleritis, periorbital lesions.   Neck/ Face examined for parotid gland swelling, range of motion of neck.  Left upper and lower and right upper and lower extremities examined for tenderness, swelling, warmth of the appendicular joints, range of motion, edema, rash.  Some of the important findings included: she does not have evidence of synovitis in the palpable joints of the upper extremities.  No significant deformities of the digits.  Marked Heberden's especially the right side, marked tenderness of the first CMC's bilaterally, with minimal grinding..  Patient Active Problem List    Diagnosis Date Noted    Tendinitis of right hip flexor 01/12/2024     Priority: Medium    Routine general medical examination at a health care facility 05/12/2023     Priority: Medium    Type 2 diabetes mellitus without complication, without long-term current use of insulin (H) 05/12/2023     Priority: Medium    Hypermobility arthralgia 02/06/2018     Priority: Medium    Herpes Simplex Acute Gingivostomatitis      Priority: Medium    Avitaminosis D 08/26/2016     Priority: Medium    History of gestational diabetes 08/25/2016     Priority: Medium    Gastroesophageal reflux disease without esophagitis 08/25/2016     Priority: Medium    Class 2 severe obesity due to excess calories with serious comorbidity and body mass index (BMI) of 35.0 to 35.9 in adult (H) 08/25/2016     Priority: Medium    Polyarthritis Of The Hand      Priority: Medium    Generalized Osteoarthritis Of The Hand      Priority: Medium    Osteoarthritis of multiple joints      Priority: Medium    Joint Pain Fingers      Priority: Medium     Past Surgical History:   Procedure Laterality Date    ABDOMEN SURGERY  ?    I had an ovarian cyst removed and i also did IVF    ARTHROSCOPIC REPAIR ACL      2016    BIOPSY      When i had my colonoscopy    COLONOSCOPY N/A 10/12/2023    Procedure: COLONOSCOPY WITH POLYPECTOMY X1;  Surgeon: Willam Ansari MD;   Location: [facility]    GENITOURINARY SURGERY  2009?    Ovarian cyst removed    LASER ABLATION      ORTHOPEDIC SURGERY  6+ years ago    ACL surgery    OVARIAN CYST REMOVAL        Past Medical History:   Diagnosis Date    Arthritis 5+ years ago    Diabetes (H) 5-12-23    Dysuria 05/12/2023    Elevated blood pressure reading in office without diagnosis of hypertension 05/12/2023    Gastroesophageal reflux disease     High risk medication use 08/18/2016    Hypertension 5-11-23     No Known Allergies  Current Outpatient Medications   Medication Sig Dispense Refill    aspirin 81 MG EC tablet Take 1 tablet (81 mg) by mouth daily      blood glucose (NO BRAND SPECIFIED) lancets standard Use to test blood sugar three times daily or as directed. 300 each 3    blood glucose (NO BRAND SPECIFIED) test strip Use to test blood sugar three times daily or as directed. 300 strip 3    blood glucose monitoring (NO BRAND SPECIFIED) meter device kit Use to test blood sugar 2 times daily or as directed. 1 kit 0    Continuous Glucose Sensor (FREESTYLE HELEN 2 PLUS SENSOR) MISC 1 each every 14 days. 3 each 3    Continuous Glucose Sensor (FREESTYLE HELEN 2 SENSOR) MISC CHANGE SENSOR EVERY 14 DAYS AS DIRECTED. 2 each 11    metFORMIN (GLUCOPHAGE XR) 500 MG 24 hr tablet Take 1 tablet (500 mg) by mouth daily (with dinner). 90 tablet 1    multivitamin w/minerals (MULTI-VITAMIN) tablet Take 1 tablet by mouth daily      simvastatin (ZOCOR) 40 MG tablet Take 1 tablet (40 mg) by mouth at bedtime 90 tablet 3    tirzepatide-Weight Management (ZEPBOUND) 15 MG/0.5ML prefilled pen Inject 0.5 mLs (15 mg) subcutaneously every 7 days. 2 mL 5    valACYclovir (VALTREX) 1000 mg tablet [VALACYCLOVIR (VALTREX) 1000 MG TABLET] TAKE 2 TABLETS BY MOUTH 1 TIME. REPEAT WITH 2 TABLETS IN 12 HOURS 30 tablet 1     family history includes Anxiety Disorder in her mother; Coronary Artery Disease in her mother; Diabetes in her mother; Heart Disease in her paternal  grandmother; Hyperlipidemia in her father; Hypertension in her father; No Known Problems in her daughter and son; Obesity in her brother, father, and mother; Osteoporosis in her mother; Sleep Apnea in her mother.  Social Connections: Unknown (6/13/2024)    Social Connection and Isolation Panel [NHANES]     Frequency of Communication with Friends and Family: Not on file     Frequency of Social Gatherings with Friends and Family: Once a week     Attends Scientologist Services: Not on file     Active Member of Clubs or Organizations: Not on file     Attends Club or Organization Meetings: Not on file     Marital Status: Not on file          WBC Count   Date Value Ref Range Status   05/12/2023 7.9 4.0 - 11.0 10e3/uL Final     RBC Count   Date Value Ref Range Status   05/12/2023 5.04 3.80 - 5.20 10e6/uL Final     Hemoglobin   Date Value Ref Range Status   05/12/2023 15.2 11.7 - 15.7 g/dL Final     Hematocrit   Date Value Ref Range Status   05/12/2023 43.8 35.0 - 47.0 % Final     MCV   Date Value Ref Range Status   05/12/2023 87 78 - 100 fL Final     MCH   Date Value Ref Range Status   05/12/2023 30.2 26.5 - 33.0 pg Final     Platelet Count   Date Value Ref Range Status   05/12/2023 256 150 - 450 10e3/uL Final     AST   Date Value Ref Range Status   05/12/2023 22 10 - 35 U/L Final     ALT   Date Value Ref Range Status   05/12/2023 31 10 - 35 U/L Final     Albumin   Date Value Ref Range Status   05/12/2023 4.1 3.5 - 5.2 g/dL Final   01/02/2019 3.8 3.5 - 5.0 g/dL Final     Alkaline Phosphatase   Date Value Ref Range Status   05/12/2023 99 35 - 104 U/L Final     Creatinine   Date Value Ref Range Status   06/13/2024 0.67 0.51 - 0.95 mg/dL Final     GFR Estimate   Date Value Ref Range Status   06/13/2024 >90 >60 mL/min/1.73m2 Final     Comment:     eGFR calculated using 2021 CKD-EPI equation.   01/02/2019 >60 >60 mL/min/1.73m2 Final     GFR Estimate If Black   Date Value Ref Range Status   01/02/2019 >60 >60 mL/min/1.73m2 Final      Creatinine Urine mg/dL   Date Value Ref Range Status   11/26/2024 69.3 mg/dL Final     Comment:     The reference ranges have not been established in urine creatinine. The results should be integrated into the clinical context for interpretation.     Erythrocyte Sedimentation Rate   Date Value Ref Range Status   09/14/2023 20 0 - 20 mm/hr Final

## 2025-06-08 ENCOUNTER — HEALTH MAINTENANCE LETTER (OUTPATIENT)
Age: 50
End: 2025-06-08

## 2025-06-13 ENCOUNTER — TRANSFERRED RECORDS (OUTPATIENT)
Dept: HEALTH INFORMATION MANAGEMENT | Facility: CLINIC | Age: 50
End: 2025-06-13

## 2025-06-13 PROBLEM — Z00.00 ROUTINE GENERAL MEDICAL EXAMINATION AT A HEALTH CARE FACILITY: Status: RESOLVED | Noted: 2023-05-12 | Resolved: 2025-06-13

## 2025-06-26 ENCOUNTER — MYC MEDICAL ADVICE (OUTPATIENT)
Dept: FAMILY MEDICINE | Facility: CLINIC | Age: 50
End: 2025-06-26
Payer: COMMERCIAL

## 2025-06-26 DIAGNOSIS — E11.9 TYPE 2 DIABETES MELLITUS WITHOUT COMPLICATION, WITHOUT LONG-TERM CURRENT USE OF INSULIN (H): Primary | ICD-10-CM

## 2025-06-26 NOTE — TELEPHONE ENCOUNTER
"6/20 OV note-   Type 2 diabetes mellitus without complication, without long-term current use of insulin (H)  49 year old year old female in clinic today to discuss treatment of the following conditions through diet and lifestyle modification and weight loss:  1. Type 2 diabetes mellitus without complication, without long-term current use of insulin (H)    2. Class 1 obesity due to excess calories with serious comorbidity and body mass index (BMI) of 34.0 to 34.9 in adult       The patient's weight loss result since the last visit was successful based on weight loss.  She saw her PCP last week.  Her hemoglobin A1c was 5.7%.  Overall, she continues to make progress.  She is losing weight now that she is on a therapeutic dose of tirzepatide.  There is a question of formulary coverage.  She has comorbid type 2 diabetes and is currently on tirzepatide.  When she was on semaglutide she did experience improvement of overall glycemic control but she did not lose as much weight as we would have hoped.  For this reason, she has been moved over to tirzepatide.  If necessary, we will move her over to Mounjaro if covered by insurance.  Ideally should simply stay on Zepbound.  There is no absolute contraindication to semaglutide although it is not resulting in weight loss.  It did result in glycemic control.  Saxenda: Side effects.  Phentermine: Not effective.  Orlistat: Would not predict that she would lose 14+ percent.  Anticipate she would regain weight with worsening glycemic control if she were to transition off of a GLP-1 receptor agonist on orlistat.     Please consider these considerations in determining eligibility for continuity with Zepbound.  If necessary, \"Plan B\" will be tirzepatide branded as Mounjaro.     "